# Patient Record
Sex: MALE | Race: WHITE | NOT HISPANIC OR LATINO | URBAN - METROPOLITAN AREA
[De-identification: names, ages, dates, MRNs, and addresses within clinical notes are randomized per-mention and may not be internally consistent; named-entity substitution may affect disease eponyms.]

---

## 2017-01-25 ENCOUNTER — OUTPATIENT (OUTPATIENT)
Dept: OUTPATIENT SERVICES | Facility: HOSPITAL | Age: 62
LOS: 1 days | End: 2017-01-25
Payer: COMMERCIAL

## 2017-01-25 ENCOUNTER — APPOINTMENT (OUTPATIENT)
Dept: ULTRASOUND IMAGING | Facility: CLINIC | Age: 62
End: 2017-01-25

## 2017-01-25 DIAGNOSIS — Z00.8 ENCOUNTER FOR OTHER GENERAL EXAMINATION: ICD-10-CM

## 2017-01-25 PROCEDURE — 76700 US EXAM ABDOM COMPLETE: CPT

## 2017-11-15 ENCOUNTER — APPOINTMENT (OUTPATIENT)
Dept: CT IMAGING | Facility: IMAGING CENTER | Age: 62
End: 2017-11-15
Payer: COMMERCIAL

## 2017-11-15 ENCOUNTER — OUTPATIENT (OUTPATIENT)
Dept: OUTPATIENT SERVICES | Facility: HOSPITAL | Age: 62
LOS: 1 days | End: 2017-11-15
Payer: COMMERCIAL

## 2017-11-15 DIAGNOSIS — Z00.8 ENCOUNTER FOR OTHER GENERAL EXAMINATION: ICD-10-CM

## 2017-11-15 PROCEDURE — 71250 CT THORAX DX C-: CPT

## 2017-11-15 PROCEDURE — 71250 CT THORAX DX C-: CPT | Mod: 26

## 2019-01-03 ENCOUNTER — APPOINTMENT (OUTPATIENT)
Dept: PULMONOLOGY | Facility: CLINIC | Age: 64
End: 2019-01-03
Payer: COMMERCIAL

## 2019-01-03 VITALS
HEART RATE: 88 BPM | SYSTOLIC BLOOD PRESSURE: 120 MMHG | HEIGHT: 68 IN | RESPIRATION RATE: 16 BRPM | BODY MASS INDEX: 21.22 KG/M2 | WEIGHT: 140 LBS | DIASTOLIC BLOOD PRESSURE: 70 MMHG | OXYGEN SATURATION: 97 %

## 2019-01-03 DIAGNOSIS — K58.9 IRRITABLE BOWEL SYNDROME W/OUT DIARRHEA: ICD-10-CM

## 2019-01-03 PROCEDURE — 94727 GAS DIL/WSHOT DETER LNG VOL: CPT

## 2019-01-03 PROCEDURE — 94250: CPT

## 2019-01-03 PROCEDURE — 99214 OFFICE O/P EST MOD 30 MIN: CPT | Mod: 25

## 2019-01-03 PROCEDURE — 94729 DIFFUSING CAPACITY: CPT

## 2019-01-03 PROCEDURE — 99406 BEHAV CHNG SMOKING 3-10 MIN: CPT

## 2019-01-03 PROCEDURE — 94060 EVALUATION OF WHEEZING: CPT

## 2019-01-03 PROCEDURE — 71046 X-RAY EXAM CHEST 2 VIEWS: CPT

## 2019-01-03 PROCEDURE — 88738 HGB QUANT TRANSCUTANEOUS: CPT

## 2019-01-14 ENCOUNTER — FORM ENCOUNTER (OUTPATIENT)
Age: 64
End: 2019-01-14

## 2019-01-15 ENCOUNTER — APPOINTMENT (OUTPATIENT)
Dept: CT IMAGING | Facility: IMAGING CENTER | Age: 64
End: 2019-01-15
Payer: COMMERCIAL

## 2019-01-15 ENCOUNTER — OUTPATIENT (OUTPATIENT)
Dept: OUTPATIENT SERVICES | Facility: HOSPITAL | Age: 64
LOS: 1 days | End: 2019-01-15
Payer: COMMERCIAL

## 2019-01-15 DIAGNOSIS — R91.8 OTHER NONSPECIFIC ABNORMAL FINDING OF LUNG FIELD: ICD-10-CM

## 2019-01-15 PROCEDURE — 71250 CT THORAX DX C-: CPT

## 2019-01-15 PROCEDURE — 71250 CT THORAX DX C-: CPT | Mod: 26

## 2019-02-25 ENCOUNTER — FORM ENCOUNTER (OUTPATIENT)
Age: 64
End: 2019-02-25

## 2019-02-25 ENCOUNTER — LABORATORY RESULT (OUTPATIENT)
Age: 64
End: 2019-02-25

## 2019-02-26 ENCOUNTER — OUTPATIENT (OUTPATIENT)
Dept: OUTPATIENT SERVICES | Facility: HOSPITAL | Age: 64
LOS: 1 days | End: 2019-02-26
Payer: COMMERCIAL

## 2019-02-26 ENCOUNTER — NON-APPOINTMENT (OUTPATIENT)
Age: 64
End: 2019-02-26

## 2019-02-26 ENCOUNTER — APPOINTMENT (OUTPATIENT)
Dept: CT IMAGING | Facility: CLINIC | Age: 64
End: 2019-02-26

## 2019-02-26 ENCOUNTER — APPOINTMENT (OUTPATIENT)
Dept: CARDIOLOGY | Facility: CLINIC | Age: 64
End: 2019-02-26
Payer: COMMERCIAL

## 2019-02-26 VITALS
RESPIRATION RATE: 16 BRPM | HEART RATE: 74 BPM | DIASTOLIC BLOOD PRESSURE: 82 MMHG | SYSTOLIC BLOOD PRESSURE: 127 MMHG | BODY MASS INDEX: 21.37 KG/M2 | HEIGHT: 68 IN | WEIGHT: 141 LBS

## 2019-02-26 VITALS — BODY MASS INDEX: 19.11 KG/M2 | WEIGHT: 129 LBS | HEIGHT: 69 IN

## 2019-02-26 VITALS
RESPIRATION RATE: 16 BRPM | BODY MASS INDEX: 19.11 KG/M2 | DIASTOLIC BLOOD PRESSURE: 84 MMHG | WEIGHT: 129 LBS | HEART RATE: 73 BPM | HEIGHT: 69 IN | SYSTOLIC BLOOD PRESSURE: 127 MMHG

## 2019-02-26 DIAGNOSIS — M19.90 UNSPECIFIED OSTEOARTHRITIS, UNSPECIFIED SITE: ICD-10-CM

## 2019-02-26 DIAGNOSIS — Z00.8 ENCOUNTER FOR OTHER GENERAL EXAMINATION: ICD-10-CM

## 2019-02-26 DIAGNOSIS — E55.9 VITAMIN D DEFICIENCY, UNSPECIFIED: ICD-10-CM

## 2019-02-26 DIAGNOSIS — Z87.891 PERSONAL HISTORY OF NICOTINE DEPENDENCE: ICD-10-CM

## 2019-02-26 PROCEDURE — 75571 CT HRT W/O DYE W/CA TEST: CPT

## 2019-02-26 PROCEDURE — 93000 ELECTROCARDIOGRAM COMPLETE: CPT

## 2019-02-26 PROCEDURE — 99242 OFF/OP CONSLTJ NEW/EST SF 20: CPT

## 2019-02-26 RX ORDER — METHYLPREDNISOLONE 4 MG/1
4 TABLET ORAL
Qty: 1 | Refills: 0 | Status: COMPLETED | COMMUNITY
Start: 2019-01-03 | End: 2019-02-26

## 2019-02-26 NOTE — REASON FOR VISIT
[Consultation] : a consultation regarding [Dyspnea] : dyspnea [FreeTextEntry1] : murmur, coronary calcification

## 2019-02-26 NOTE — PHYSICAL EXAM
[General Appearance - Well Developed] : well developed [Normal Appearance] : normal appearance [Well Groomed] : well groomed [General Appearance - Well Nourished] : well nourished [No Deformities] : no deformities [General Appearance - In No Acute Distress] : no acute distress [Normal Conjunctiva] : the conjunctiva exhibited no abnormalities [Normal Oral Mucosa] : normal oral mucosa [Normal Jugular Venous A Waves Present] : normal jugular venous A waves present [Normal Jugular Venous V Waves Present] : normal jugular venous V waves present [No Jugular Venous Kramer A Waves] : no jugular venous kramer A waves [5th Left ICS - MCL] : palpated at the 5th LICS in the midclavicular line [Normal] : normal [No Precordial Heave] : no precordial heave was noted [Normal Rate] : normal [Rhythm Regular] : regular [Normal S1] : normal S1 [Normal S2] : normal S2 [No Gallop] : no gallop heard [II] : a grade 2 [2+] : left 2+ [No Pitting Edema] : no pitting edema present [Respiration, Rhythm And Depth] : normal respiratory rhythm and effort [Exaggerated Use Of Accessory Muscles For Inspiration] : no accessory muscle use [Auscultation Breath Sounds / Voice Sounds] : lungs were clear to auscultation bilaterally [Bowel Sounds] : normal bowel sounds [Abdomen Soft] : soft [Abnormal Walk] : normal gait [Gait - Sufficient For Exercise Testing] : the gait was sufficient for exercise testing [Nail Clubbing] : no clubbing of the fingernails [Cyanosis, Localized] : no localized cyanosis [Skin Color & Pigmentation] : normal skin color and pigmentation [Skin Turgor] : normal skin turgor [] : no rash [Oriented To Time, Place, And Person] : oriented to person, place, and time [Affect] : the affect was normal [Mood] : the mood was normal [No Anxiety] : not feeling anxious [FreeTextEntry1] : left bruit vs. transmitted murmur [Click] : no click [Distant] : the heart sounds were ~L not distant [Pericardial Rub] : no pericardial rub

## 2019-02-26 NOTE — DISCUSSION/SUMMARY
[FreeTextEntry1] : This is a 63-year-old male with past medical history significant for hyperlipidemia, status post cholecystectomy, coronary artery calcification, who comes in for cardiac consultation. The patient denies chest pain, dizziness, palpitations or syncope. The patient has no history of rheumatic fever. Cardiac risk factors include hyperlipidemia, and smoking a half a pack to one pack per day over the last 35 years. He was just given Wellbutrin and may be able to accomplish smoking cessation. He will work with his pulmonologist for that issue.\par The patient reports that he had a recent normal exercise stress test. He'll get me a copy of those results. He also reports that he been on Pravachol 40 mg daily which was recently switched to Crestor 10 mg daily after he was found to have coronary artery calcification.\par The patient at chest CAT scan in 2017 which demonstrated coronary artery calcification. A new CAT scan done January 15, 2019 confirm the presence of coronary atherosclerosis. There was no detailed definition regarding extent the location of the coronary calcification.\par Electrocardiogram doesn't February 26, 2019 demonstrated normal sinus rhythm rate 72 beats per minutes otherwise remarkable for left axis deviation and left atrial abnormality.\par He will schedule an echo Doppler examination to evaluate murmur, left ventricular function, chamber size, and rule out hypertrophy.\par He does get occasional leg cramping on walking and will get an ankle brachial index done. A carotid Doppler examination will also be done to rule out significant carotid artery disease. He will schedule a coronary artery calcium score to accurately define the burden of calcium in his coronary arteries.\par The patient will followup with me after the above noted diagnostic tests are completed. His LDL cholesterol goal is less than 70 mg/dL.\par Smoking cessation was strongly encouraged.\par The patient understands that aerobic exercises must be increased to 40 minutes 4 times per week. A detailed discussion of lifestyle modification was done today. The patient has a good understanding of the diagnosis, and treatment plan. Lifestyle modification was also outlined.

## 2019-03-05 ENCOUNTER — APPOINTMENT (OUTPATIENT)
Dept: CARDIOLOGY | Facility: CLINIC | Age: 64
End: 2019-03-05
Payer: COMMERCIAL

## 2019-03-05 PROCEDURE — 93922 UPR/L XTREMITY ART 2 LEVELS: CPT

## 2019-03-05 PROCEDURE — 93306 TTE W/DOPPLER COMPLETE: CPT

## 2019-03-05 PROCEDURE — ZZZZZ: CPT

## 2019-03-19 RX ORDER — LINACLOTIDE 290 UG/1
290 CAPSULE, GELATIN COATED ORAL DAILY
Refills: 0 | Status: ACTIVE | COMMUNITY
Start: 2019-01-03

## 2019-03-20 ENCOUNTER — APPOINTMENT (OUTPATIENT)
Dept: CARDIOLOGY | Facility: CLINIC | Age: 64
End: 2019-03-20
Payer: COMMERCIAL

## 2019-03-20 VITALS
RESPIRATION RATE: 16 BRPM | HEIGHT: 68 IN | HEART RATE: 62 BPM | BODY MASS INDEX: 21.37 KG/M2 | WEIGHT: 141 LBS | SYSTOLIC BLOOD PRESSURE: 128 MMHG | DIASTOLIC BLOOD PRESSURE: 80 MMHG

## 2019-03-20 PROCEDURE — 99213 OFFICE O/P EST LOW 20 MIN: CPT

## 2019-03-20 NOTE — PHYSICAL EXAM
[General Appearance - Well Developed] : well developed [Normal Appearance] : normal appearance [Well Groomed] : well groomed [General Appearance - Well Nourished] : well nourished [No Deformities] : no deformities [General Appearance - In No Acute Distress] : no acute distress [Normal Conjunctiva] : the conjunctiva exhibited no abnormalities [Normal Oral Mucosa] : normal oral mucosa [Normal Jugular Venous A Waves Present] : normal jugular venous A waves present [Normal Jugular Venous V Waves Present] : normal jugular venous V waves present [No Jugular Venous Kramer A Waves] : no jugular venous kramer A waves [Respiration, Rhythm And Depth] : normal respiratory rhythm and effort [Exaggerated Use Of Accessory Muscles For Inspiration] : no accessory muscle use [Auscultation Breath Sounds / Voice Sounds] : lungs were clear to auscultation bilaterally [Bowel Sounds] : normal bowel sounds [Abdomen Soft] : soft [Abnormal Walk] : normal gait [Gait - Sufficient For Exercise Testing] : the gait was sufficient for exercise testing [Nail Clubbing] : no clubbing of the fingernails [Cyanosis, Localized] : no localized cyanosis [Skin Color & Pigmentation] : normal skin color and pigmentation [Skin Turgor] : normal skin turgor [] : no rash [Oriented To Time, Place, And Person] : oriented to person, place, and time [Affect] : the affect was normal [Mood] : the mood was normal [No Anxiety] : not feeling anxious [5th Left ICS - MCL] : palpated at the 5th LICS in the midclavicular line [Normal] : normal [No Precordial Heave] : no precordial heave was noted [Normal Rate] : normal [Rhythm Regular] : regular [Normal S1] : normal S1 [Normal S2] : normal S2 [No Gallop] : no gallop heard [II] : a grade 2 [2+] : left 2+ [No Pitting Edema] : no pitting edema present [FreeTextEntry1] : left bruit vs. transmitted murmur [Click] : no click [Pericardial Rub] : no pericardial rub

## 2019-03-20 NOTE — DISCUSSION/SUMMARY
[FreeTextEntry1] : This is a 63-year-old male with past medical history significant for hyperlipidemia, status post cholecystectomy, coronary artery calcification, who comes in for cardiac follow up evaluation. The patient denies chest pain, dizziness, palpitations or syncope. The patient has no history of rheumatic fever. Cardiac risk factors include hyperlipidemia, and smoking a half a pack to one pack per day over the last 35 years.\par The patient had a coronary calcium score done February 26, 2019 which demonstrated a total coronary calcium level of 129 units, all in the left anterior descending artery.\par The patient had a nuclear stress test done by his prior cardiologist April 30, 2018 which demonstrated normal coronary perfusion with an estimated ejection fraction 61% and normal wall motion.\par Blood work done February 26, 2019 demonstrated an LDL direct cholesterol level of 141 mg/dL, ApoB lipoprotein 121.\par The patient is instructed to followup with his pulmonologist regarding his smoking cessation.  Smoking cessation was reinforced.  He will have blood work in 6-8 weeks.  He is currently hemodynamically stable and asymptomatic from a cardiac standpoint.  He will also remain on aspirin 162 mg per day.\par He was just given Wellbutrin and may be able to accomplish smoking cessation. He will work with his pulmonologist for that issue.\par .The patient at chest CAT scan in 2017 which demonstrated coronary artery calcification. A new CAT scan done January 15, 2019 confirm the presence of coronary atherosclerosis. There was no detailed definition regarding extent the location of the coronary calcification.\par Electrocardiogram doesn't February 26, 2019 demonstrated normal sinus rhythm rate 72 beats per minutes otherwise remarkable for left axis deviation and left atrial abnormality.\par He will schedule an echo Doppler examination to evaluate murmur, left ventricular function, chamber size, and rule out hypertrophy.\par He does get occasional leg cramping on walking and will get an ankle brachial index done. A carotid Doppler examination will also be done to rule out significant carotid artery disease. He will schedule a coronary artery calcium score to accurately define the burden of calcium in his coronary arteries.\par The patient will followup with me after the above noted diagnostic tests are completed. His LDL cholesterol goal is less than 70 mg/dL.\par Smoking cessation was strongly encouraged.\par The patient understands that aerobic exercises must be increased to 40 minutes 4 times per week. A detailed discussion of lifestyle modification was done today. The patient has a good understanding of the diagnosis, and treatment plan. Lifestyle modification was also outlined.

## 2019-03-27 RX ORDER — ROSUVASTATIN CALCIUM 10 MG/1
10 TABLET, FILM COATED ORAL DAILY
Qty: 90 | Refills: 1 | Status: DISCONTINUED | COMMUNITY
Start: 2019-01-03 | End: 2019-03-27

## 2019-04-04 ENCOUNTER — APPOINTMENT (OUTPATIENT)
Dept: PULMONOLOGY | Facility: CLINIC | Age: 64
End: 2019-04-04

## 2019-04-30 ENCOUNTER — RX RENEWAL (OUTPATIENT)
Age: 64
End: 2019-04-30

## 2019-05-10 ENCOUNTER — LABORATORY RESULT (OUTPATIENT)
Age: 64
End: 2019-05-10

## 2019-09-05 ENCOUNTER — APPOINTMENT (OUTPATIENT)
Dept: PULMONOLOGY | Facility: CLINIC | Age: 64
End: 2019-09-05
Payer: COMMERCIAL

## 2019-09-05 VITALS — HEART RATE: 82 BPM | OXYGEN SATURATION: 96 % | SYSTOLIC BLOOD PRESSURE: 106 MMHG | DIASTOLIC BLOOD PRESSURE: 64 MMHG

## 2019-09-05 PROCEDURE — 99406 BEHAV CHNG SMOKING 3-10 MIN: CPT

## 2019-09-05 PROCEDURE — 99213 OFFICE O/P EST LOW 20 MIN: CPT | Mod: 25

## 2019-09-05 PROCEDURE — 71046 X-RAY EXAM CHEST 2 VIEWS: CPT

## 2019-09-05 NOTE — REVIEW OF SYSTEMS
[Nasal Congestion] : nasal congestion [As Noted in HPI] : as noted in HPI [Negative] : Cardiovascular

## 2019-09-05 NOTE — HISTORY OF PRESENT ILLNESS
[FreeTextEntry1] : 8 days of "head congestion", now "settling into the chest" cough with green sputum, feels tired/run down.  Wife just getting over parainfluenza virus.  no fever/chills.  still smoking daily.  cut back since hasn't been feeling well.

## 2019-09-05 NOTE — PHYSICAL EXAM
[Well Groomed] : well groomed [General Appearance - In No Acute Distress] : no acute distress [Erythema] : erythema of the pharynx [Heart Sounds] : normal S1 and S2 [Neck Appearance] : the appearance of the neck was normal [] : no respiratory distress [Respiration, Rhythm And Depth] : normal respiratory rhythm and effort [Exaggerated Use Of Accessory Muscles For Inspiration] : no accessory muscle use [Auscultation Breath Sounds / Voice Sounds] : lungs were clear to auscultation bilaterally [Nail Clubbing] : no clubbing of the fingernails [Cyanosis, Localized] : no localized cyanosis

## 2019-09-05 NOTE — ASSESSMENT
[FreeTextEntry1] : medrol pack\par smoking cessation counseling, pt states he has quit before, in process of trying to quit again with wife.\par follow up rvp\par will let me know if develops any other symptoms such as fever/chills.

## 2019-09-05 NOTE — PROCEDURE
[FreeTextEntry1] : CXR: clear lungs, no focal consolidation or pleural effusions, cardiac silhouette appears normal.  No bony abnormality.\par \par

## 2019-09-06 LAB
RAPID RVP RESULT: DETECTED
RV+EV RNA SPEC QL NAA+PROBE: DETECTED

## 2019-09-09 ENCOUNTER — APPOINTMENT (OUTPATIENT)
Dept: PULMONOLOGY | Facility: CLINIC | Age: 64
End: 2019-09-09
Payer: COMMERCIAL

## 2019-09-09 VITALS — SYSTOLIC BLOOD PRESSURE: 117 MMHG | OXYGEN SATURATION: 97 % | HEART RATE: 76 BPM | DIASTOLIC BLOOD PRESSURE: 69 MMHG

## 2019-09-09 DIAGNOSIS — J06.9 ACUTE UPPER RESPIRATORY INFECTION, UNSPECIFIED: ICD-10-CM

## 2019-09-09 PROCEDURE — 87880 STREP A ASSAY W/OPTIC: CPT | Mod: QW

## 2019-09-09 PROCEDURE — 99213 OFFICE O/P EST LOW 20 MIN: CPT | Mod: 25

## 2019-09-10 LAB — S PYO AG SPEC QL IA: NORMAL

## 2019-09-10 NOTE — PHYSICAL EXAM
[General Appearance - Well Developed] : well developed [Normal Appearance] : normal appearance [Well Groomed] : well groomed [General Appearance - Well Nourished] : well nourished [No Deformities] : no deformities [General Appearance - In No Acute Distress] : no acute distress [Heart Rate And Rhythm] : heart rate and rhythm were normal [Erythema] : erythema of the pharynx [Murmurs] : no murmurs present [Heart Sounds] : normal S1 and S2 [Exaggerated Use Of Accessory Muscles For Inspiration] : no accessory muscle use [Respiration, Rhythm And Depth] : normal respiratory rhythm and effort [Abdomen Soft] : soft [Auscultation Breath Sounds / Voice Sounds] : lungs were clear to auscultation bilaterally [Abdomen Tenderness] : non-tender [Nail Clubbing] : no clubbing of the fingernails [Abdomen Mass (___ Cm)] : no abdominal mass palpated [Petechial Hemorrhages (___cm)] : no petechial hemorrhages [Cyanosis, Localized] : no localized cyanosis [] : no ischemic changes

## 2019-09-10 NOTE — REASON FOR VISIT
[Follow-Up] : a follow-up visit [Cough] : cough [FreeTextEntry2] : PND for 2 weeks, Rx Medrol pack. RVP + Rhinovirus

## 2019-09-13 ENCOUNTER — RX CHANGE (OUTPATIENT)
Age: 64
End: 2019-09-13

## 2019-09-16 ENCOUNTER — RX RENEWAL (OUTPATIENT)
Age: 64
End: 2019-09-16

## 2019-09-16 RX ORDER — AZITHROMYCIN 250 MG/1
250 TABLET, FILM COATED ORAL
Qty: 6 | Refills: 0 | Status: DISCONTINUED | COMMUNITY
Start: 2019-09-09 | End: 2019-09-16

## 2019-09-19 ENCOUNTER — APPOINTMENT (OUTPATIENT)
Dept: PULMONOLOGY | Facility: CLINIC | Age: 64
End: 2019-09-19
Payer: COMMERCIAL

## 2019-09-19 VITALS
WEIGHT: 141 LBS | DIASTOLIC BLOOD PRESSURE: 68 MMHG | HEIGHT: 68 IN | OXYGEN SATURATION: 96 % | RESPIRATION RATE: 16 BRPM | SYSTOLIC BLOOD PRESSURE: 117 MMHG | HEART RATE: 81 BPM | BODY MASS INDEX: 21.37 KG/M2

## 2019-09-19 PROCEDURE — 94729 DIFFUSING CAPACITY: CPT

## 2019-09-19 PROCEDURE — 95012 NITRIC OXIDE EXP GAS DETER: CPT

## 2019-09-19 PROCEDURE — 71046 X-RAY EXAM CHEST 2 VIEWS: CPT

## 2019-09-19 PROCEDURE — 99214 OFFICE O/P EST MOD 30 MIN: CPT | Mod: 25

## 2019-09-19 PROCEDURE — 94727 GAS DIL/WSHOT DETER LNG VOL: CPT

## 2019-09-19 PROCEDURE — 94060 EVALUATION OF WHEEZING: CPT

## 2019-09-19 NOTE — PROCEDURE
[FreeTextEntry1] : Exhaled nitric oxide 14 ppb\par \par Chest x-ray PA lateral\par September 19, 2019\par Predominantly right mid lower lung zone bronchiectatic changes\par \par PFT September 19, 2019\par Well-preserved flow rates FEV1 FVC ratio 70 with an obstructive pattern\par Normal lung volumes with total capacity 124% predicted.\par Diffusion moderately reduced at 61% predicted with a loss of functioning alveolocapillary units\par Heme globin 14.7\par Decline at diffusion with interval improvement at the total lung capacity\par \par PFT January 3, 2019\par Very minimal obstructive ventilatory impairment with well-preserved flow rates\par No bronchodilator response at FEV1\par Lung volumes normal\par Very minimal diffusion abnormality with loss of functioning alveolar capillary units 72% of predicted.\par Hemoglobin 14.5\par \par Chest x-ray PA lateral projection January 3, 2019\par Cardiac size normal\par Hyper aeration\par Peribronchial cuffing\par No dominant pulmonary nodules parenchymal infiltrates pleural effusions\par Mediastinum normal\par Increased retrosternal airspace consistent with air-trapping\par Impression COPD

## 2019-09-19 NOTE — PHYSICAL EXAM
[General Appearance - Well Developed] : well developed [Normal Appearance] : normal appearance [Well Groomed] : well groomed [General Appearance - Well Nourished] : well nourished [No Deformities] : no deformities [General Appearance - In No Acute Distress] : no acute distress [Normal Conjunctiva] : the conjunctiva exhibited no abnormalities [Eyelids - No Xanthelasma] : the eyelids demonstrated no xanthelasmas [Normal Oropharynx] : normal oropharynx [I] : I [Neck Appearance] : the appearance of the neck was normal [Neck Cervical Mass (___cm)] : no neck mass was observed [Jugular Venous Distention Increased] : there was no jugular-venous distention [Thyroid Diffuse Enlargement] : the thyroid was not enlarged [Thyroid Nodule] : there were no palpable thyroid nodules [Heart Rate And Rhythm] : heart rate and rhythm were normal [Heart Sounds] : normal S1 and S2 [Murmurs] : no murmurs present [Arterial Pulses Normal] : the arterial pulses were normal [Edema] : no peripheral edema present [Veins - Varicosity Changes] : no varicosital changes were noted in the lower extremities [Respiration, Rhythm And Depth] : normal respiratory rhythm and effort [Exaggerated Use Of Accessory Muscles For Inspiration] : no accessory muscle use [Auscultation Breath Sounds / Voice Sounds] : lungs were clear to auscultation bilaterally [Chest Palpation] : palpation of the chest revealed no abnormalities [Lungs Percussion] : the lungs were normal to percussion [Bowel Sounds] : normal bowel sounds [Abdomen Soft] : soft [Abdomen Tenderness] : non-tender [Abdomen Mass (___ Cm)] : no abdominal mass palpated [Abnormal Walk] : normal gait [Gait - Sufficient For Exercise Testing] : the gait was sufficient for exercise testing [Nail Clubbing] : no clubbing of the fingernails [Cyanosis, Localized] : no localized cyanosis [Petechial Hemorrhages (___cm)] : no petechial hemorrhages [Skin Color & Pigmentation] : normal skin color and pigmentation [] : no rash [No Venous Stasis] : no venous stasis [Skin Lesions] : no skin lesions [No Skin Ulcers] : no skin ulcer [No Xanthoma] : no  xanthoma was observed [Deep Tendon Reflexes (DTR)] : deep tendon reflexes were 2+ and symmetric [Sensation] : the sensory exam was normal to light touch and pinprick [Motor Exam] : the motor exam was normal [No Focal Deficits] : no focal deficits [Oriented To Time, Place, And Person] : oriented to person, place, and time [Impaired Insight] : insight and judgment were intact [Affect] : the affect was normal

## 2019-09-19 NOTE — HISTORY OF PRESENT ILLNESS
[Date: ___] : was performed [unfilled] [de-identified] : subcm pulmonary nodules/ apical scar [FreeTextEntry1] : Complaint chronic cough\par Did complete a course of oral steroids and did feel somewhat better while on the steroids cefuroxime mean\par Has some back pain due to the cough\par No purulent sputum\par Active tobacco use\par No reported fevers chills or sweats\par No lower extremity edema\par \par \par in past did use Wellbutrin with some mild success but now actively smoking\par s/p URI\par Chest congestion\par

## 2019-09-19 NOTE — DISCUSSION/SUMMARY
[FreeTextEntry1] : \par CT chest completed\par No change in subcentimeter pulmonary nodules in a high risk smoker\par 1 year low-dose screening follow-up protocol to consider as well Jan 2020\par Chronic cough\par Obstructive airway disease/COPd with Diffusion abnormality\par Active tobacco smoker\par Asmanex  MCG 2 puffs twice daily with instructions to rinse with MDI instructions\par Spiriva Respimat 2.5 MCG 2 puffs daily with instructions\par Will provide Pro Air for rescue inhalation\par Office follow-up

## 2019-10-04 ENCOUNTER — APPOINTMENT (OUTPATIENT)
Dept: PULMONOLOGY | Facility: CLINIC | Age: 64
End: 2019-10-04
Payer: COMMERCIAL

## 2019-10-04 VITALS
RESPIRATION RATE: 15 BRPM | BODY MASS INDEX: 20.92 KG/M2 | SYSTOLIC BLOOD PRESSURE: 106 MMHG | HEIGHT: 68 IN | WEIGHT: 138 LBS | DIASTOLIC BLOOD PRESSURE: 68 MMHG | HEART RATE: 72 BPM | OXYGEN SATURATION: 97 %

## 2019-10-04 PROCEDURE — 99213 OFFICE O/P EST LOW 20 MIN: CPT | Mod: 25

## 2019-10-04 PROCEDURE — 94060 EVALUATION OF WHEEZING: CPT

## 2019-10-04 RX ORDER — HYDROCORTISONE ACETATE, PRAMOXINE HCL 2.5; 1 G/100G; G/100G
2.5-1 CREAM TOPICAL
Qty: 57 | Refills: 0 | Status: DISCONTINUED | COMMUNITY
Start: 2019-05-01

## 2019-10-04 RX ORDER — DICYCLOMINE HYDROCHLORIDE 10 MG/1
10 CAPSULE ORAL
Qty: 90 | Refills: 0 | Status: DISCONTINUED | COMMUNITY
Start: 2019-05-01

## 2019-10-04 NOTE — PROCEDURE
[FreeTextEntry1] : Ergometry October 4, 2019\par Normal flow rates\par Very minimal obstructive pattern\par Interval improvement at flow rates\par \par Exhaled nitric oxide 14 ppb Sept 19 2019\par \par Chest x-ray PA lateral\par September 19, 2019\par Predominantly right mid lower lung zone bronchiectatic changes\par \par PFT September 19, 2019\par Well-preserved flow rates FEV1 FVC ratio 70 with an obstructive pattern\par Normal lung volumes with total capacity 124% predicted.\par Diffusion moderately reduced at 61% predicted with a loss of functioning alveolocapillary units\par Heme globin 14.7\par Decline at diffusion with interval improvement at the total lung capacity\par \par Chest x-ray PA lateral projection January 3, 2019\par Cardiac size normal\par Hyper aeration\par Peribronchial cuffing\par No dominant pulmonary nodules parenchymal infiltrates pleural effusions\par Mediastinum normal\par Increased retrosternal airspace consistent with air-trapping\par Impression COPD

## 2019-10-04 NOTE — HISTORY OF PRESENT ILLNESS
[Date: ___] : was performed [unfilled] [de-identified] : subcm pulmonary nodules/ apical scar [FreeTextEntry1] : in past did use Wellbutrin with some mild success but now actively smoking\par s/p URI\par Chest congestion better post  tx with  antibx\par

## 2019-10-04 NOTE — PHYSICAL EXAM
[General Appearance - Well Developed] : well developed [Normal Appearance] : normal appearance [No Deformities] : no deformities [General Appearance - Well Nourished] : well nourished [Well Groomed] : well groomed [General Appearance - In No Acute Distress] : no acute distress [Normal Conjunctiva] : the conjunctiva exhibited no abnormalities [Eyelids - No Xanthelasma] : the eyelids demonstrated no xanthelasmas [Normal Oropharynx] : normal oropharynx [I] : I [Neck Appearance] : the appearance of the neck was normal [Neck Cervical Mass (___cm)] : no neck mass was observed [Jugular Venous Distention Increased] : there was no jugular-venous distention [Thyroid Diffuse Enlargement] : the thyroid was not enlarged [Heart Sounds] : normal S1 and S2 [Heart Rate And Rhythm] : heart rate and rhythm were normal [Thyroid Nodule] : there were no palpable thyroid nodules [Murmurs] : no murmurs present [Arterial Pulses Normal] : the arterial pulses were normal [Edema] : no peripheral edema present [Veins - Varicosity Changes] : no varicosital changes were noted in the lower extremities [Exaggerated Use Of Accessory Muscles For Inspiration] : no accessory muscle use [Respiration, Rhythm And Depth] : normal respiratory rhythm and effort [Chest Palpation] : palpation of the chest revealed no abnormalities [Auscultation Breath Sounds / Voice Sounds] : lungs were clear to auscultation bilaterally [Lungs Percussion] : the lungs were normal to percussion [Bowel Sounds] : normal bowel sounds [Abdomen Soft] : soft [Abdomen Tenderness] : non-tender [Abnormal Walk] : normal gait [Abdomen Mass (___ Cm)] : no abdominal mass palpated [Nail Clubbing] : no clubbing of the fingernails [Gait - Sufficient For Exercise Testing] : the gait was sufficient for exercise testing [Petechial Hemorrhages (___cm)] : no petechial hemorrhages [Cyanosis, Localized] : no localized cyanosis [Skin Color & Pigmentation] : normal skin color and pigmentation [No Venous Stasis] : no venous stasis [] : no rash [Skin Lesions] : no skin lesions [No Xanthoma] : no  xanthoma was observed [No Skin Ulcers] : no skin ulcer [Sensation] : the sensory exam was normal to light touch and pinprick [Deep Tendon Reflexes (DTR)] : deep tendon reflexes were 2+ and symmetric [Motor Exam] : the motor exam was normal [No Focal Deficits] : no focal deficits [Oriented To Time, Place, And Person] : oriented to person, place, and time [Impaired Insight] : insight and judgment were intact [Affect] : the affect was normal

## 2019-10-11 ENCOUNTER — APPOINTMENT (OUTPATIENT)
Dept: CARDIOLOGY | Facility: CLINIC | Age: 64
End: 2019-10-11
Payer: COMMERCIAL

## 2019-11-01 ENCOUNTER — NON-APPOINTMENT (OUTPATIENT)
Age: 64
End: 2019-11-01

## 2019-11-01 ENCOUNTER — APPOINTMENT (OUTPATIENT)
Dept: CARDIOLOGY | Facility: CLINIC | Age: 64
End: 2019-11-01
Payer: COMMERCIAL

## 2019-11-01 VITALS
WEIGHT: 127 LBS | HEIGHT: 68 IN | BODY MASS INDEX: 19.25 KG/M2 | RESPIRATION RATE: 15 BRPM | SYSTOLIC BLOOD PRESSURE: 116 MMHG | DIASTOLIC BLOOD PRESSURE: 76 MMHG | HEART RATE: 76 BPM

## 2019-11-01 DIAGNOSIS — Z86.79 PERSONAL HISTORY OF OTHER DISEASES OF THE CIRCULATORY SYSTEM: ICD-10-CM

## 2019-11-01 DIAGNOSIS — R09.89 OTHER SPECIFIED SYMPTOMS AND SIGNS INVOLVING THE CIRCULATORY AND RESPIRATORY SYSTEMS: ICD-10-CM

## 2019-11-01 PROCEDURE — 99214 OFFICE O/P EST MOD 30 MIN: CPT

## 2019-11-01 PROCEDURE — 93000 ELECTROCARDIOGRAM COMPLETE: CPT

## 2019-11-04 RX ORDER — FLUTICASONE PROPIONATE 50 UG/1
50 SPRAY, METERED NASAL
Qty: 1 | Refills: 3 | Status: COMPLETED | COMMUNITY
Start: 2019-09-09 | End: 2019-11-04

## 2019-11-04 RX ORDER — SODIUM SULFATE, POTASSIUM SULFATE, MAGNESIUM SULFATE 17.5; 3.13; 1.6 G/ML; G/ML; G/ML
17.5-3.13-1.6 SOLUTION, CONCENTRATE ORAL
Qty: 354 | Refills: 0 | Status: COMPLETED | COMMUNITY
Start: 2019-05-02 | End: 2019-11-04

## 2019-11-04 RX ORDER — BUPROPION HYDROCHLORIDE 150 MG/1
150 TABLET, FILM COATED, EXTENDED RELEASE ORAL DAILY
Qty: 30 | Refills: 3 | Status: COMPLETED | COMMUNITY
Start: 2019-01-03 | End: 2019-11-04

## 2019-11-04 RX ORDER — CEFUROXIME AXETIL 500 MG/1
500 TABLET ORAL
Qty: 14 | Refills: 0 | Status: COMPLETED | COMMUNITY
Start: 2019-09-16 | End: 2019-11-04

## 2019-11-04 RX ORDER — TIOTROPIUM BROMIDE INHALATION SPRAY 3.12 UG/1
2.5 SPRAY, METERED RESPIRATORY (INHALATION)
Qty: 1 | Refills: 3 | Status: COMPLETED | COMMUNITY
Start: 2019-09-19 | End: 2019-11-04

## 2019-11-04 RX ORDER — METHYLPREDNISOLONE 4 MG/1
4 TABLET ORAL
Qty: 1 | Refills: 0 | Status: COMPLETED | COMMUNITY
Start: 2019-09-05 | End: 2019-11-04

## 2019-11-04 RX ORDER — MOMETASONE FUROATE 100 UG/1
100 AEROSOL RESPIRATORY (INHALATION)
Qty: 1 | Refills: 0 | Status: COMPLETED | COMMUNITY
Start: 2019-09-19 | End: 2019-11-04

## 2020-01-15 ENCOUNTER — APPOINTMENT (OUTPATIENT)
Dept: PULMONOLOGY | Facility: CLINIC | Age: 65
End: 2020-01-15

## 2020-03-19 ENCOUNTER — APPOINTMENT (OUTPATIENT)
Dept: CARDIOLOGY | Facility: CLINIC | Age: 65
End: 2020-03-19

## 2020-04-15 ENCOUNTER — TRANSCRIPTION ENCOUNTER (OUTPATIENT)
Age: 65
End: 2020-04-15

## 2020-06-09 ENCOUNTER — APPOINTMENT (OUTPATIENT)
Dept: CARDIOLOGY | Facility: CLINIC | Age: 65
End: 2020-06-09
Payer: MEDICARE

## 2020-06-09 ENCOUNTER — NON-APPOINTMENT (OUTPATIENT)
Age: 65
End: 2020-06-09

## 2020-06-09 VITALS
HEART RATE: 80 BPM | DIASTOLIC BLOOD PRESSURE: 70 MMHG | HEIGHT: 68 IN | SYSTOLIC BLOOD PRESSURE: 110 MMHG | WEIGHT: 135 LBS | BODY MASS INDEX: 20.46 KG/M2 | RESPIRATION RATE: 16 BRPM

## 2020-06-09 PROCEDURE — 99213 OFFICE O/P EST LOW 20 MIN: CPT

## 2020-06-09 PROCEDURE — 93000 ELECTROCARDIOGRAM COMPLETE: CPT

## 2020-06-10 RX ORDER — DIAZEPAM 5 MG/1
5 TABLET ORAL
Refills: 0 | Status: ACTIVE | COMMUNITY
Start: 2019-06-28

## 2020-07-20 ENCOUNTER — APPOINTMENT (OUTPATIENT)
Dept: DISASTER EMERGENCY | Facility: CLINIC | Age: 65
End: 2020-07-20

## 2020-07-23 ENCOUNTER — APPOINTMENT (OUTPATIENT)
Dept: PULMONOLOGY | Facility: CLINIC | Age: 65
End: 2020-07-23

## 2020-08-14 DIAGNOSIS — Z20.828 CONTACT WITH AND (SUSPECTED) EXPOSURE TO OTHER VIRAL COMMUNICABLE DISEASES: ICD-10-CM

## 2020-08-16 DIAGNOSIS — Z01.818 ENCOUNTER FOR OTHER PREPROCEDURAL EXAMINATION: ICD-10-CM

## 2020-08-17 ENCOUNTER — APPOINTMENT (OUTPATIENT)
Dept: DISASTER EMERGENCY | Facility: CLINIC | Age: 65
End: 2020-08-17

## 2020-08-17 LAB — SARS-COV-2 N GENE NPH QL NAA+PROBE: NOT DETECTED

## 2020-08-20 ENCOUNTER — APPOINTMENT (OUTPATIENT)
Dept: PULMONOLOGY | Facility: CLINIC | Age: 65
End: 2020-08-20
Payer: MEDICARE

## 2020-08-20 PROCEDURE — 99406 BEHAV CHNG SMOKING 3-10 MIN: CPT | Mod: 25

## 2020-08-20 PROCEDURE — 94729 DIFFUSING CAPACITY: CPT

## 2020-08-20 PROCEDURE — 94750: CPT

## 2020-08-20 PROCEDURE — 99214 OFFICE O/P EST MOD 30 MIN: CPT | Mod: 25

## 2020-08-20 PROCEDURE — 71046 X-RAY EXAM CHEST 2 VIEWS: CPT

## 2020-08-20 PROCEDURE — 94726 PLETHYSMOGRAPHY LUNG VOLUMES: CPT

## 2020-08-20 PROCEDURE — 94010 BREATHING CAPACITY TEST: CPT

## 2020-08-20 PROCEDURE — 95012 NITRIC OXIDE EXP GAS DETER: CPT

## 2020-08-20 PROCEDURE — G0296 VISIT TO DETERM LDCT ELIG: CPT

## 2020-08-20 NOTE — PHYSICAL EXAM
[General Appearance - Well Developed] : well developed [Normal Appearance] : normal appearance [General Appearance - Well Nourished] : well nourished [Well Groomed] : well groomed [No Deformities] : no deformities [Normal Conjunctiva] : the conjunctiva exhibited no abnormalities [General Appearance - In No Acute Distress] : no acute distress [Eyelids - No Xanthelasma] : the eyelids demonstrated no xanthelasmas [Normal Oropharynx] : normal oropharynx [I] : I [Neck Appearance] : the appearance of the neck was normal [Jugular Venous Distention Increased] : there was no jugular-venous distention [Thyroid Diffuse Enlargement] : the thyroid was not enlarged [Neck Cervical Mass (___cm)] : no neck mass was observed [Heart Rate And Rhythm] : heart rate and rhythm were normal [Thyroid Nodule] : there were no palpable thyroid nodules [Heart Sounds] : normal S1 and S2 [Murmurs] : no murmurs present [Arterial Pulses Normal] : the arterial pulses were normal [Edema] : no peripheral edema present [Veins - Varicosity Changes] : no varicosital changes were noted in the lower extremities [Respiration, Rhythm And Depth] : normal respiratory rhythm and effort [Auscultation Breath Sounds / Voice Sounds] : lungs were clear to auscultation bilaterally [Lungs Percussion] : the lungs were normal to percussion [Exaggerated Use Of Accessory Muscles For Inspiration] : no accessory muscle use [Chest Palpation] : palpation of the chest revealed no abnormalities [Abdomen Soft] : soft [Bowel Sounds] : normal bowel sounds [Abdomen Tenderness] : non-tender [Abdomen Mass (___ Cm)] : no abdominal mass palpated [Abnormal Walk] : normal gait [Nail Clubbing] : no clubbing of the fingernails [Gait - Sufficient For Exercise Testing] : the gait was sufficient for exercise testing [Cyanosis, Localized] : no localized cyanosis [Petechial Hemorrhages (___cm)] : no petechial hemorrhages [Skin Color & Pigmentation] : normal skin color and pigmentation [] : no rash [No Venous Stasis] : no venous stasis [Skin Lesions] : no skin lesions [Deep Tendon Reflexes (DTR)] : deep tendon reflexes were 2+ and symmetric [No Skin Ulcers] : no skin ulcer [No Xanthoma] : no  xanthoma was observed [Sensation] : the sensory exam was normal to light touch and pinprick [Motor Exam] : the motor exam was normal [No Focal Deficits] : no focal deficits [Impaired Insight] : insight and judgment were intact [Oriented To Time, Place, And Person] : oriented to person, place, and time [Affect] : the affect was normal

## 2020-08-20 NOTE — PROCEDURE
[FreeTextEntry1] : NIOX 8  ppb August 20, 2020\par PFT without bronchodilator August 20, 2020\par Well-preserved flow rates mild obstructive pattern\par Normal lung volumes with borderline air trapping\par Specific conductance and resistance normal\par Reduction diffusion 65% of predicted with a loss of functioning alveolocapillary units\par Hemoglobin 14.9\par \par Pulmonary 6-minute walk stress test August 20, 2020\par Baseline room air O2 saturation 98%\par No desaturation\par Impression normal study\par \par \par \par Chest x-ray PA lateral projection January 3, 2019\par Cardiac size normal\par Hyper aeration\par Peribronchial cuffing\par No dominant pulmonary nodules parenchymal infiltrates pleural effusions\par Mediastinum normal\par Increased retrosternal airspace consistent with air-trapping\par Impression COPD\par \par PCV 23 1/2020

## 2020-08-20 NOTE — DISCUSSION/SUMMARY
[FreeTextEntry1] : COPD with bronchiectasis\par Low-dose CAT scan screening set for January 2020\par Active tobacco use\par Continue to advised on tobacco cessation\par he remain on Spiriva Asmanex as ordered\par PRN Pro Air follow-up 3 months\par Prevnair 1/2021\par Based on tobacco history and risk factors patient does qualify for low-dose CAT scan screening protocol.  Risks benefits of low-dose screening protocol discussed in detail with patient.\par All questions answered at today's visit.\par 3-7 minute discussion with patient about smoking cessation was initiated with patient showing interest in attempting to quit smoking. Problems with risks of continued tobacco smoking including respiratory, cardiovascular, and oncological problems were discussed. Advice on smoking cessation was reiterated including methods of quitting, setting a date to quit, and different medicines and support systems available for smoking cessation was discussed. Addressed  options including nicotine patches gums lozenges, Wellbutrin, Chantix as well as smoking cessation program.\par f/u  3 months\par \par

## 2020-08-20 NOTE — HISTORY OF PRESENT ILLNESS
[Current] : current [>= 30 pack years] : >= 30 pack years [Date: ___] : was performed [unfilled] [TextBox_11] : 1 [de-identified] : subcm pulmonary nodules/ apical scar [TextBox_13] : 35 [FreeTextEntry1] : in past did use Wellbutrin with some mild success but now actively smoking\par s/p URI\par Active respiratory symptoms\par Continues to smoke\par Addressed all questions regarding immunization profile\par No fevers chills or sweats\par No COVID-19 known primary contact exposures\par No lower extremity edema\par Denies chest pain chest tightness wheezing sputum production or hemoptysis\par \par

## 2020-09-01 VITALS — HEIGHT: 68 IN | WEIGHT: 135 LBS | BODY MASS INDEX: 20.46 KG/M2

## 2020-09-01 NOTE — ASSESSMENT
[Discussed Risks and Advised to Quit Smoking] : Discussed risks and advised to quit smoking [Discussed Cessation Strategies] : cessation strategies were discussed [Ready] : Patient is ready for cessation intervention [Preparation] : Preparation: The patient is getting ready to quit smoking [de-identified] : Dr. Avilez has ordered Chantix, and patient is planning on quitting with his wife (who is also a smoker).

## 2020-09-01 NOTE — PLAN
[Smoking Cessation] : smoking cessation [Age appropriate screenings] : Age appropriate screenings [Regular Exercise] : regular exercise [Regular follow-up with healthcare provider] : regular follow-up with healthcare provider [FreeTextEntry1] : His LDCT is scheduled for 9/3/20 at the [facility] location.  He will follow up with Dr. Avilez for the results.

## 2020-09-01 NOTE — HISTORY OF PRESENT ILLNESS
[Current] : current smoker [_____ pack-years] : [unfilled] pack-years [TextBox_13] : He denies hemoptysis, denies new cough, denies unexplained weight loss.\par He is a current smoker with a 35 pack year smoking history (1PPD x 35 years).  He denies family h/o lung cancer.  He is referred by Dr. Kenny Avilez.  This is a telephonic visit.\par

## 2020-09-03 ENCOUNTER — APPOINTMENT (OUTPATIENT)
Dept: CT IMAGING | Facility: IMAGING CENTER | Age: 65
End: 2020-09-03
Payer: MEDICARE

## 2020-09-03 ENCOUNTER — OUTPATIENT (OUTPATIENT)
Dept: OUTPATIENT SERVICES | Facility: HOSPITAL | Age: 65
LOS: 1 days | End: 2020-09-03
Payer: MEDICARE

## 2020-09-03 DIAGNOSIS — Z72.0 TOBACCO USE: ICD-10-CM

## 2020-09-03 PROCEDURE — G0297: CPT

## 2020-09-03 PROCEDURE — G0297: CPT | Mod: 26

## 2020-10-26 ENCOUNTER — RX RENEWAL (OUTPATIENT)
Age: 65
End: 2020-10-26

## 2020-11-09 ENCOUNTER — APPOINTMENT (OUTPATIENT)
Dept: CARDIOLOGY | Facility: CLINIC | Age: 65
End: 2020-11-09
Payer: MEDICARE

## 2020-11-09 ENCOUNTER — LABORATORY RESULT (OUTPATIENT)
Age: 65
End: 2020-11-09

## 2020-11-09 VITALS
WEIGHT: 131 LBS | DIASTOLIC BLOOD PRESSURE: 80 MMHG | SYSTOLIC BLOOD PRESSURE: 126 MMHG | BODY MASS INDEX: 19.85 KG/M2 | RESPIRATION RATE: 15 BRPM | HEART RATE: 70 BPM | HEIGHT: 68 IN

## 2020-11-09 PROCEDURE — ZZZZZ: CPT

## 2020-11-09 PROCEDURE — 93922 UPR/L XTREMITY ART 2 LEVELS: CPT

## 2020-11-09 PROCEDURE — 99213 OFFICE O/P EST LOW 20 MIN: CPT

## 2020-11-25 ENCOUNTER — APPOINTMENT (OUTPATIENT)
Dept: CARDIOLOGY | Facility: CLINIC | Age: 65
End: 2020-11-25

## 2020-12-21 PROBLEM — J06.9 ACUTE URI: Status: RESOLVED | Noted: 2019-09-05 | Resolved: 2020-12-21

## 2021-01-05 ENCOUNTER — APPOINTMENT (OUTPATIENT)
Dept: PULMONOLOGY | Facility: CLINIC | Age: 66
End: 2021-01-05
Payer: MEDICARE

## 2021-01-05 VITALS
SYSTOLIC BLOOD PRESSURE: 119 MMHG | OXYGEN SATURATION: 97 % | TEMPERATURE: 97.8 F | DIASTOLIC BLOOD PRESSURE: 70 MMHG | HEART RATE: 80 BPM

## 2021-01-05 PROCEDURE — 99214 OFFICE O/P EST MOD 30 MIN: CPT | Mod: 25

## 2021-01-05 PROCEDURE — 99406 BEHAV CHNG SMOKING 3-10 MIN: CPT

## 2021-01-05 RX ORDER — VARENICLINE TARTRATE 1 MG/1
1 TABLET, FILM COATED ORAL
Qty: 1 | Refills: 0 | Status: DISCONTINUED | COMMUNITY
Start: 2020-08-03 | End: 2021-01-05

## 2021-01-05 NOTE — DISCUSSION/SUMMARY
[FreeTextEntry1] : COPD with bronchiectasis\par Low-dose CAT scan screening set for January 2021\par Active tobacco use\par Continue to advised on tobacco cessation\par he remain on Spiriva Asmanex as ordered- OFF\par PRN Pro Air follow-up 3 months\par Prevnair 1/2021- states to get at [facility]\par \par 3-7 minute discussion with patient about smoking cessation was initiated with patient showing interest in attempting to quit smoking. Problems with risks of continued tobacco smoking including respiratory, cardiovascular, and oncological problems were discussed. Advice on smoking cessation was reiterated including methods of quitting, setting a date to quit, and different medicines and support systems available for smoking cessation was discussed. Addressed  options including nicotine patches gums lozenges, Wellbutrin, Chantix as well as smoking cessation program.\par f/u  3 months\par \par

## 2021-01-05 NOTE — HISTORY OF PRESENT ILLNESS
[>= 30 pack years] : >= 30 pack years [_____ pack-years] : [unfilled] pack-years [Date: ___] : was performed [unfilled] [Current] : is a current smoker [TextBox_11] : 1 [TextBox_13] : \par  [de-identified] : subcm pulmonary nodules/ apical scar [FreeTextEntry1] : side  effects with Chantix  Did  get Nicotine  patch which has  lepled from 1 ppd to  1/2  ppd over past  3  weeks\par in past did use Wellbutrin with some mild success but now actively smoking\par s/p URI\par No Active respiratory symptoms\par Continues to smoke\par Addressed all questions regarding immunization profile\par No fevers chills or sweats\par No COVID-19 known primary contact exposures\par No lower extremity edema\par Denies chest pain chest tightness wheezing sputum production or hemoptysis\par \par

## 2021-01-05 NOTE — REASON FOR VISIT
[Low-Dose CT Screening Discussion] : low-dose CT lung cancer screening discussion [Follow-Up] : a follow-up visit [COPD] : COPD [FreeTextEntry2] : Tobacco Use

## 2021-01-05 NOTE — PROCEDURE
[FreeTextEntry1] : Chest CT scan low-dose protocol September 3, 2020 Baseline study\par Did compared to studies of January 15, 2019 January 15, 2017\par Left upper lobe pulmonary nodule 3 mm no change\par Mild tracheobronchial secretions\par Adenopathy negative\par Impression lung RADS 2\par Annual screening low-dose chest CT 1 year\par \par \par NIOX 8  ppb August 20, 2020\par PFT without bronchodilator August 20, 2020\par Well-preserved flow rates mild obstructive pattern\par Normal lung volumes with borderline air trapping\par Specific conductance and resistance normal\par Reduction diffusion 65% of predicted with a loss of functioning alveolocapillary units\par Hemoglobin 14.9\par \par Pulmonary 6-minute walk stress test August 20, 2020\par Baseline room air O2 saturation 98%\par No desaturation\par Impression normal study\par \par Chest x-ray PA lateral projection January 3, 2019\par Cardiac size normal\par Hyper aeration\par Peribronchial cuffing\par No dominant pulmonary nodules parenchymal infiltrates pleural effusions\par Mediastinum normal\par Increased retrosternal airspace consistent with air-trapping\par Impression COPD\par \par PCV 23 1/2020

## 2021-01-05 NOTE — PLAN
[Smoking Cessation] : smoking cessation [Age appropriate screenings] : Age appropriate screenings [Regular Exercise] : regular exercise [Regular follow-up with healthcare provider] : regular follow-up with healthcare provider [FreeTextEntry1] : His LDCT is scheduled for 9/3/20 at the [facility] location.  He will follow up with Dr. Avilez for the results.

## 2021-01-05 NOTE — ASSESSMENT
[Discussed Risks and Advised to Quit Smoking] : Discussed risks and advised to quit smoking [Discussed Cessation Strategies] : cessation strategies were discussed [Ready] : Patient is ready for cessation intervention [Preparation] : Preparation: The patient is getting ready to quit smoking [de-identified] : Dr. Avilez has ordered Chantix, and patient is planning on quitting with his wife (who is also a smoker).

## 2021-02-01 ENCOUNTER — APPOINTMENT (OUTPATIENT)
Dept: PULMONOLOGY | Facility: CLINIC | Age: 66
End: 2021-02-01

## 2021-02-04 ENCOUNTER — APPOINTMENT (OUTPATIENT)
Dept: PULMONOLOGY | Facility: CLINIC | Age: 66
End: 2021-02-04

## 2021-02-09 ENCOUNTER — APPOINTMENT (OUTPATIENT)
Dept: CARDIOLOGY | Facility: CLINIC | Age: 66
End: 2021-02-09

## 2021-03-05 ENCOUNTER — APPOINTMENT (OUTPATIENT)
Dept: PULMONOLOGY | Facility: CLINIC | Age: 66
End: 2021-03-05

## 2021-04-02 ENCOUNTER — NON-APPOINTMENT (OUTPATIENT)
Age: 66
End: 2021-04-02

## 2021-04-22 ENCOUNTER — APPOINTMENT (OUTPATIENT)
Dept: PULMONOLOGY | Facility: CLINIC | Age: 66
End: 2021-04-22

## 2021-04-23 LAB — SARS-COV-2 N GENE NPH QL NAA+PROBE: NOT DETECTED

## 2021-04-27 ENCOUNTER — APPOINTMENT (OUTPATIENT)
Dept: PULMONOLOGY | Facility: CLINIC | Age: 66
End: 2021-04-27
Payer: MEDICARE

## 2021-04-27 VITALS
SYSTOLIC BLOOD PRESSURE: 106 MMHG | TEMPERATURE: 97.4 F | DIASTOLIC BLOOD PRESSURE: 67 MMHG | HEART RATE: 78 BPM | OXYGEN SATURATION: 99 %

## 2021-04-27 LAB — POCT - HEMOGLOBIN (HGB), QUANTITATIVE, TRANSCUTANEOUS: 13

## 2021-04-27 PROCEDURE — 94729 DIFFUSING CAPACITY: CPT

## 2021-04-27 PROCEDURE — 99406 BEHAV CHNG SMOKING 3-10 MIN: CPT | Mod: 25

## 2021-04-27 PROCEDURE — 94727 GAS DIL/WSHOT DETER LNG VOL: CPT

## 2021-04-27 PROCEDURE — 99214 OFFICE O/P EST MOD 30 MIN: CPT | Mod: 25

## 2021-04-27 PROCEDURE — 94010 BREATHING CAPACITY TEST: CPT

## 2021-04-27 PROCEDURE — 88738 HGB QUANT TRANSCUTANEOUS: CPT

## 2021-04-27 PROCEDURE — ZZZZZ: CPT

## 2021-04-27 PROCEDURE — 95012 NITRIC OXIDE EXP GAS DETER: CPT

## 2021-04-27 NOTE — PROCEDURE
[FreeTextEntry1] : NIOC  10 ppbn 4/27/21 NL\par PFT 4/27 21\par Mild OAD\par  Lung Volumes nl\par DLCO 68 % with minimal loss fx  alveolar  capillary units\par  HGB 13.0\par \par Chest CT scan low-dose protocol September 3, 2020 Baseline study\par Did compared to studies of January 15, 2019 January 15, 2017\par Left upper lobe pulmonary nodule 3 mm no change\par Mild tracheobronchial secretions\par Adenopathy negative\par Impression lung RADS 2\par Annual screening low-dose chest CT 1 year\par \par NIOX 8  ppb August 20, 2020\par PFT without bronchodilator August 20, 2020\par Well-preserved flow rates mild obstructive pattern\par Normal lung volumes with borderline air trapping\par Specific conductance and resistance normal\par Reduction diffusion 65% of predicted with a loss of functioning alveolocapillary units\par Hemoglobin 14.9\par \par Pulmonary 6-minute walk stress test August 20, 2020\par Baseline room air O2 saturation 98%\par No desaturation\par Impression normal study\par \par Chest x-ray PA lateral projection January 3, 2019\par Cardiac size normal\par Hyper aeration\par Peribronchial cuffing\par No dominant pulmonary nodules parenchymal infiltrates pleural effusions\par Mediastinum normal\par Increased retrosternal airspace consistent with air-trapping\par Impression COPD\par \par PCV 23 1/2020\par \par

## 2021-04-27 NOTE — HISTORY OF PRESENT ILLNESS
[Stable] : are stable [None] : ~He/She~ has no significant interval events [Difficulty Breathing During Exertion] : stable dyspnea on exertion [Feelings Of Weakness On Exertion] : stable exercise intolerance [Cough] : denies coughing [Wheezing] : denies wheezing [Regional Soft Tissue Swelling Both Lower Extremities] : denies lower extremity edema [Chest Pain Or Discomfort] : denies chest pain [Fever] : denies fever [Date: ___] : was performed [unfilled] [Wt Gain ___ Lbs] : no recent weight gain [Wt Loss ___ Lbs] : no recent weight loss [Oxygen] : the patient uses no supplemental oxygen [de-identified] : subcm pulmonary nodules/ apical scar [FreeTextEntry1] : side  effects with Chantix  Did  get Nicotine  patch which has  helped from 1 ppd to  1/2  ppd over past  3  weeks\par in past did use Wellbutrin with some mild success but now actively smoking\par s/p URI\par No Active respiratory symptoms\par Continues to smoke\par Addressed all questions regarding immunization profile\par No fevers chills or sweats\par No COVID-19 known primary contact exposures\par No lower extremity edema\par Denies chest pain chest tightness wheezing sputum production or hemoptysis\par \par

## 2021-04-27 NOTE — HISTORY OF PRESENT ILLNESS
[Stable] : are stable [None] : ~He/She~ has no significant interval events [Difficulty Breathing During Exertion] : stable dyspnea on exertion [Feelings Of Weakness On Exertion] : stable exercise intolerance [Cough] : denies coughing [Wheezing] : denies wheezing [Regional Soft Tissue Swelling Both Lower Extremities] : denies lower extremity edema [Chest Pain Or Discomfort] : denies chest pain [Fever] : denies fever [Date: ___] : was performed [unfilled] [Wt Gain ___ Lbs] : no recent weight gain [Wt Loss ___ Lbs] : no recent weight loss [Oxygen] : the patient uses no supplemental oxygen [de-identified] : subcm pulmonary nodules/ apical scar [FreeTextEntry1] : side  effects with Chantix  Did  get Nicotine  patch which has  helped from 1 ppd to  1/2  ppd over past  3  weeks\par in past did use Wellbutrin with some mild success but now actively smoking\par s/p URI\par No Active respiratory symptoms\par Continues to smoke\par Addressed all questions regarding immunization profile\par No fevers chills or sweats\par No COVID-19 known primary contact exposures\par No lower extremity edema\par Denies chest pain chest tightness wheezing sputum production or hemoptysis\par \par

## 2021-04-27 NOTE — DISCUSSION/SUMMARY
[FreeTextEntry1] : COPD with bronchiectasis\par Active tobacco use\par Continue to advised on tobacco cessation\par he remain on Spiriva Asmanex as ordered- OFF\par PRN Pro Air follow-up 3 months\par Prevnair 1/2021- states to get at [facility]\par \par 3-7 minute discussion with patient about smoking cessation was initiated with patient showing interest in attempting to quit smoking. Problems with risks of continued tobacco smoking including respiratory, cardiovascular, and oncological problems were discussed. Advice on smoking cessation was reiterated including methods of quitting, setting a date to quit, and different medicines and support systems available for smoking cessation was discussed. Addressed  options including nicotine patches gums lozenges, Wellbutrin, Chantix as well as smoking cessation program.\par f/u  3 months\par \par

## 2021-05-25 ENCOUNTER — LABORATORY RESULT (OUTPATIENT)
Age: 66
End: 2021-05-25

## 2021-05-25 ENCOUNTER — APPOINTMENT (OUTPATIENT)
Dept: CARDIOLOGY | Facility: CLINIC | Age: 66
End: 2021-05-25
Payer: MEDICARE

## 2021-05-25 ENCOUNTER — NON-APPOINTMENT (OUTPATIENT)
Age: 66
End: 2021-05-25

## 2021-05-25 VITALS
TEMPERATURE: 97.9 F | DIASTOLIC BLOOD PRESSURE: 70 MMHG | OXYGEN SATURATION: 97 % | WEIGHT: 131 LBS | HEART RATE: 89 BPM | RESPIRATION RATE: 16 BRPM | HEIGHT: 68 IN | BODY MASS INDEX: 19.85 KG/M2 | SYSTOLIC BLOOD PRESSURE: 100 MMHG

## 2021-05-25 DIAGNOSIS — N40.0 BENIGN PROSTATIC HYPERPLASIA WITHOUT LOWER URINARY TRACT SYMPMS: ICD-10-CM

## 2021-05-25 PROCEDURE — 99214 OFFICE O/P EST MOD 30 MIN: CPT

## 2021-05-25 PROCEDURE — 93000 ELECTROCARDIOGRAM COMPLETE: CPT

## 2021-05-25 RX ORDER — ALFUZOSIN HYDROCHLORIDE 10 MG/1
10 TABLET, EXTENDED RELEASE ORAL DAILY
Refills: 0 | Status: ACTIVE | COMMUNITY
Start: 2021-05-25

## 2021-05-25 NOTE — PHYSICAL EXAM
[Well Developed] : well developed [Well Nourished] : well nourished [No Acute Distress] : no acute distress [Normal Venous Pressure] : normal venous pressure [No Carotid Bruit] : no carotid bruit [Normal S1, S2] : normal S1, S2 [No Murmur] : no murmur [No Rub] : no rub [Clear Lung Fields] : clear lung fields [Good Air Entry] : good air entry [No Respiratory Distress] : no respiratory distress  [Soft] : abdomen soft [Non Tender] : non-tender [No Masses/organomegaly] : no masses/organomegaly [Normal Bowel Sounds] : normal bowel sounds [Normal Gait] : normal gait [No Edema] : no edema [No Cyanosis] : no cyanosis [No Clubbing] : no clubbing [No Varicosities] : no varicosities [No Rash] : no rash [No Skin Lesions] : no skin lesions [Moves all extremities] : moves all extremities [No Focal Deficits] : no focal deficits [Normal Speech] : normal speech [Alert and Oriented] : alert and oriented [Normal memory] : normal memory [General Appearance - Well Developed] : well developed [Normal Appearance] : normal appearance [Well Groomed] : well groomed [No Deformities] : no deformities [General Appearance - Well Nourished] : well nourished [General Appearance - In No Acute Distress] : no acute distress [Normal Conjunctiva] : the conjunctiva exhibited no abnormalities [Normal Oral Mucosa] : normal oral mucosa [Normal Jugular Venous A Waves Present] : normal jugular venous A waves present [Normal Jugular Venous V Waves Present] : normal jugular venous V waves present [No Jugular Venous Kramer A Waves] : no jugular venous kramer A waves [Exaggerated Use Of Accessory Muscles For Inspiration] : no accessory muscle use [Respiration, Rhythm And Depth] : normal respiratory rhythm and effort [Auscultation Breath Sounds / Voice Sounds] : lungs were clear to auscultation bilaterally [Bowel Sounds] : normal bowel sounds [Abdomen Soft] : soft [Abnormal Walk] : normal gait [Gait - Sufficient For Exercise Testing] : the gait was sufficient for exercise testing [Nail Clubbing] : no clubbing of the fingernails [Cyanosis, Localized] : no localized cyanosis [Skin Color & Pigmentation] : normal skin color and pigmentation [Skin Turgor] : normal skin turgor [] : no rash [Oriented To Time, Place, And Person] : oriented to person, place, and time [Affect] : the affect was normal [Mood] : the mood was normal [No Anxiety] : not feeling anxious [5th Left ICS - MCL] : palpated at the 5th LICS in the midclavicular line [Normal] : normal [No Precordial Heave] : no precordial heave was noted [Normal Rate] : normal [Rhythm Regular] : regular [Normal S1] : normal S1 [Normal S2] : normal S2 [No Gallop] : no gallop heard [II] : a grade 2 [2+] : left 2+ [No Pitting Edema] : no pitting edema present [FreeTextEntry1] : left bruit vs. transmitted murmur [Click] : no click [Pericardial Rub] : no pericardial rub

## 2021-05-25 NOTE — CARDIOLOGY SUMMARY
[___] : [unfilled] [de-identified] : 5/25/2021 [de-identified] : NUC 4/30/2018 [de-identified] : 3/5/2019 [de-identified] : CAROTID DOPPLER: 3/5/2019\par NURYS: 3/5/2019

## 2021-05-25 NOTE — DISCUSSION/SUMMARY
[FreeTextEntry1] : Dr. Brooks-(PRIOR VISIT and PMH WITH Dr. Brooks): \par This is a 65-year-old male with past medical history significant for hyperlipidemia, status post cholecystectomy, coronary artery calcification, who comes in for cardiac follow up evaluation. \par The patient denies chest pain, shortness of breath, dizziness or syncope.\par   \par He had a normal nuclear stress test in 2018. \par \par  He does complain of occasional cramping in his legs.\par He will have an ankle-brachial index done today to rule out significant peripheral arterial disease given the presence of coronary artery calcification and history of smoking.\par \par Blood work done June 9, 2020 demonstrated cholesterol 142, triglycerides 121, HDL of 37, and direct LDL of 88 mg/dL.\par The patient will have new blood work today to reevaluate his lipid panel.  He is currently hemodynamically stable and asymptomatic from a cardiac standpoint.\par \par Electrocardiogram done June 9, 2020 which demonstrated normal sinus rhythm rate 80 beats per minute otherwise remarkable for left axis deviation and left atrial abnormality..\par \par  The patient will continue on his current dose of Zetia 10 mg per day in addition to his Crestor 20 mg per day.  \par He will follow up with you regarding his overall medical care prior to followup with me in 3 months.\par \par  Cardiac risk factors include hyperlipidemia, and smoking a half a pack to one pack per day over the last 35 years.\par \par The patient had a coronary calcium score done February 26, 2019 which demonstrated a total coronary calcium level of 129 units, all in the left anterior descending artery.\par \par The patient had a nuclear stress test done by his prior cardiologist April 30, 2018 which demonstrated normal coronary perfusion with an estimated ejection fraction 61% and normal wall motion.\par \par The patient is instructed to followup with his pulmonologist regarding his smoking cessation.  Smoking cessation was reinforced. \par \par The patient at chest CAT scan in 2017 which demonstrated coronary artery calcification. A new CAT scan done January 15, 2019 confirm the presence of coronary atherosclerosis. There was no detailed definition regarding extent the location of the coronary calcification.\par \par The patient understands that aerobic exercises must be increased to 40 minutes 4 times per week. A detailed discussion of lifestyle modification was done today. The patient has a good understanding of the diagnosis, and treatment plan. Lifestyle modification was also outlined.

## 2021-05-25 NOTE — REASON FOR VISIT
[CV Risk Factors and Non-Cardiac Disease] : CV risk factors and non-cardiac disease [Follow-Up - Clinic] : a clinic follow-up of [Hyperlipidemia] : hyperlipidemia [FreeTextEntry1] : murmur, coronary calcification

## 2021-05-25 NOTE — ASSESSMENT
[FreeTextEntry1] : This is a 65 year year old male here today for follow up cardiac evaluation. \par He has a past medical history significant for hyperlipidemia, status post cholecystectomy, coronary artery calcification.\par \par Today he is feeling generally well and does not have any complaints at this time. He wants to make a note that he is following up with his urologist in regards to his possible BPH.\par \par -He states that he has been helping his wife who was recently dx with breast Ca and is going through treatment at this time. \par \par -Pt is stable from a cardiac standpoint and does not have any complaints at this time. \par \par -Cardiac risk factors include HLD and hx of smoker.\par \par BLOOD PRESSURE:\par -BP is well controlled in today's visit.\par \par BLOOD WORK:\par -New blood work was done today, 05/25/2021 to evaluate lipid profile, CBC, BMP, hepatic function, A1C and TSH. Pt currently taking Rosuvastatin 20mg PO DAILY and on Zetia 10mg  PO Daily.\par \par CHOLESTEROL CONTROL:\par -Patient will continue the advised  TLC diet and to continue follow-up for treatment of hyperlipidemia and repeat blood testing with diet and exercise. I have discussed different exercises and the importance of maintenance of optimal body weight. The importance of staying within guidelines and recommendations was stressed to the patient today and they acknowledged that they understand this to me verbally.\par \par  -Mr. COWAN was educated and advised that failure to follow-up with my medical recommendations to lower cholesterol can result in severe health consequences therefore, they will continuing a low saturated and low fat diet and to avoid excessive carbohydrates to help reduce triglycerides and that lowering LDL levels is associated with a significant decrease in serious cardiac events including but not limited to heart attack stroke and overall death. We will continue lipid lowering agents as advised based on blood test results and the patient understands to call if they develop severe muscle discomfort or if they have a reddish tinted discoloration in their urine.\par \par TESTING/REPORTS:\par -Electrocardiogram done 5/25/2021 which demonstrated normal sinus rhythm rate 84 beats per minute otherwise remarkable for left axis deviation and left atrial abnormality.\par \par -The patient at chest CAT scan in 2017 which demonstrated coronary artery calcification. A new CAT scan done January 15, 2019 confirm the presence of coronary atherosclerosis. There was no detailed definition regarding extent the location of the coronary calcification.\par \par -The patient had a nuclear stress test done by his prior cardiologist April 30, 2018 which demonstrated normal coronary perfusion with an estimated ejection fraction 61% and normal wall motion.\par \par -The patient had a coronary calcium score done February 26, 2019 which demonstrated a total coronary calcium level of 129 units, all in the left anterior descending artery.\par \par -Echocardiogram done 3/5/2019 demonstrated mild mitral tricuspid and pulmonic regurgitation with normal left ventricular systolic function. EF 63%.\par \par PLAN:\par -The patient will schedule an Echo Doppler examination to evaluate murmur, left ventricular function, chamber size, and rule out hypertrophy.\par -He will continue with his usual medications and will contact the office if he is having any complaints between now and their next follow up appointment.\par -He will follow up with his urologist.\par \par I have discussed the plan of care with Mr. KAL COWAN  and he  will follow up in 3 months. He is compliant with all of his medications.\par \par The patient understands that aerobic exercises must be increased to minutes 4 times/week and a detailed discussion of lifestyle modification was done today. \par The patient has a good understanding of the diagnosis, treatment plan and lifestyle modification. \par He will contact me at the office for any questions with their care or any changes in their health status.\par \par The patient was seen together with supervision physician Dr. Jacques Brooks and the plan of care was discussed with the patient and will be carried out as noted above.\par \par Mark CABRAL

## 2021-06-08 ENCOUNTER — APPOINTMENT (OUTPATIENT)
Dept: PULMONOLOGY | Facility: CLINIC | Age: 66
End: 2021-06-08

## 2021-07-22 ENCOUNTER — APPOINTMENT (OUTPATIENT)
Dept: PULMONOLOGY | Facility: CLINIC | Age: 66
End: 2021-07-22

## 2021-07-27 ENCOUNTER — APPOINTMENT (OUTPATIENT)
Dept: PULMONOLOGY | Facility: CLINIC | Age: 66
End: 2021-07-27
Payer: MEDICARE

## 2021-07-27 ENCOUNTER — RX RENEWAL (OUTPATIENT)
Age: 66
End: 2021-07-27

## 2021-07-27 VITALS
BODY MASS INDEX: 20 KG/M2 | TEMPERATURE: 98.3 F | RESPIRATION RATE: 15 BRPM | DIASTOLIC BLOOD PRESSURE: 62 MMHG | HEART RATE: 82 BPM | WEIGHT: 132 LBS | HEIGHT: 68 IN | SYSTOLIC BLOOD PRESSURE: 103 MMHG | OXYGEN SATURATION: 97 %

## 2021-07-27 PROCEDURE — 94727 GAS DIL/WSHOT DETER LNG VOL: CPT

## 2021-07-27 PROCEDURE — 99214 OFFICE O/P EST MOD 30 MIN: CPT | Mod: 25

## 2021-07-27 PROCEDURE — 95012 NITRIC OXIDE EXP GAS DETER: CPT

## 2021-07-27 PROCEDURE — ZZZZZ: CPT

## 2021-07-27 PROCEDURE — 94729 DIFFUSING CAPACITY: CPT

## 2021-07-27 PROCEDURE — 94010 BREATHING CAPACITY TEST: CPT

## 2021-07-27 PROCEDURE — 71046 X-RAY EXAM CHEST 2 VIEWS: CPT

## 2021-07-27 NOTE — PHYSICAL EXAM
[General Appearance - Well Developed] : well developed [Normal Appearance] : normal appearance [Well Groomed] : well groomed [General Appearance - Well Nourished] : well nourished [No Deformities] : no deformities [General Appearance - In No Acute Distress] : no acute distress [Normal Conjunctiva] : the conjunctiva exhibited no abnormalities [Eyelids - No Xanthelasma] : the eyelids demonstrated no xanthelasmas [Normal Oropharynx] : normal oropharynx [I] : I [Neck Appearance] : the appearance of the neck was normal [Neck Cervical Mass (___cm)] : no neck mass was observed [Jugular Venous Distention Increased] : there was no jugular-venous distention [Thyroid Diffuse Enlargement] : the thyroid was not enlarged [Thyroid Nodule] : there were no palpable thyroid nodules [Heart Rate And Rhythm] : heart rate and rhythm were normal [Heart Sounds] : normal S1 and S2 [Murmurs] : no murmurs present [Arterial Pulses Normal] : the arterial pulses were normal [Edema] : no peripheral edema present [Veins - Varicosity Changes] : no varicosital changes were noted in the lower extremities [Respiration, Rhythm And Depth] : normal respiratory rhythm and effort [Exaggerated Use Of Accessory Muscles For Inspiration] : no accessory muscle use [Auscultation Breath Sounds / Voice Sounds] : lungs were clear to auscultation bilaterally [Chest Palpation] : palpation of the chest revealed no abnormalities [Lungs Percussion] : the lungs were normal to percussion [Bowel Sounds] : normal bowel sounds [Abdomen Soft] : soft [Abdomen Tenderness] : non-tender [Abdomen Mass (___ Cm)] : no abdominal mass palpated [Abnormal Walk] : normal gait [Gait - Sufficient For Exercise Testing] : the gait was sufficient for exercise testing [Nail Clubbing] : no clubbing of the fingernails [Cyanosis, Localized] : no localized cyanosis [Petechial Hemorrhages (___cm)] : no petechial hemorrhages [Skin Color & Pigmentation] : normal skin color and pigmentation [No Venous Stasis] : no venous stasis [] : no rash [Skin Lesions] : no skin lesions [No Skin Ulcers] : no skin ulcer [No Xanthoma] : no  xanthoma was observed [Sensation] : the sensory exam was normal to light touch and pinprick [Deep Tendon Reflexes (DTR)] : deep tendon reflexes were 2+ and symmetric [Motor Exam] : the motor exam was normal [No Focal Deficits] : no focal deficits [Oriented To Time, Place, And Person] : oriented to person, place, and time [Impaired Insight] : insight and judgment were intact [Affect] : the affect was normal

## 2021-07-27 NOTE — HISTORY OF PRESENT ILLNESS
[Stable] : are stable [None] : ~He/She~ has no significant interval events [Difficulty Breathing During Exertion] : stable dyspnea on exertion [Feelings Of Weakness On Exertion] : stable exercise intolerance [Cough] : denies coughing [Wheezing] : denies wheezing [Regional Soft Tissue Swelling Both Lower Extremities] : denies lower extremity edema [Chest Pain Or Discomfort] : denies chest pain [Fever] : denies fever [Date: ___] : was performed [unfilled] [Wt Gain ___ Lbs] : no recent weight gain [Wt Loss ___ Lbs] : no recent weight loss [Oxygen] : the patient uses no supplemental oxygen [de-identified] : subcm pulmonary nodules/ apical scar [FreeTextEntry1] : side  effects with Chantix  Did  get Nicotine  patch which has  helped from 1 ppd to  1/2  ppd over past  3  weeks\par in past did use Wellbutrin with some mild success but now actively smoking\par No Active respiratory symptoms\par Continues to smoke\par Addressed all questions regarding immunization profile\par No fevers chills or sweats\par No COVID-19 known primary contact exposures\par No lower extremity edema\par Denies chest pain chest tightness wheezing sputum production or hemoptysis\par \par

## 2021-07-27 NOTE — PROCEDURE
[FreeTextEntry1] : PFT 7/27/21\par Well preserved flow rates\par  Mild OAD\par  Lung Volumes nl\par  Low nl DLCL 73 % \par  HGB 13.0\par  stable pulmonary physiology\par NIOX 11 ppb  nl range\par \par X-ray PA lateral September 27, 2021\par Cardiac size normal\par Grossly clear lung fields\par No appreciable dominant pulmonary nodules\par Maritza mediastinum unremarkable\par Soft tissue bony structures unremarkable\par No pneumothorax\par Increased retrosternal airspace with flattening of the diaphragms consistent with hyper aeration\par Impression COPD\par \par NIOC  10 ppb  4/27/21 NL\par PFT 4/27 21\par Mild OAD\par  Lung Volumes nl\par DLCO 68 % with minimal loss fx  alveolar  capillary units\par  HGB 13.0\par \par Chest CT scan low-dose protocol September 3, 2020 Baseline study\par Did compared to studies of January 15, 2019 January 15, 2017\par Left upper lobe pulmonary nodule 3 mm no change\par Mild tracheobronchial secretions\par Adenopathy negative\par Impression lung RADS 2\par Annual screening low-dose chest CT 1 year\par \par NIOX 8  ppb August 20, 2020\par PFT without bronchodilator August 20, 2020\par Well-preserved flow rates mild obstructive pattern\par Normal lung volumes with borderline air trapping\par Specific conductance and resistance normal\par Reduction diffusion 65% of predicted with a loss of functioning alveolocapillary units\par Hemoglobin 14.9\par \par Pulmonary 6-minute walk stress test August 20, 2020\par Baseline room air O2 saturation 98%\par No desaturation\par Impression normal study\par \par Chest x-ray PA lateral projection January 3, 2019\par Cardiac size normal\par Hyper aeration\par Peribronchial cuffing\par No dominant pulmonary nodules parenchymal infiltrates pleural effusions\par Mediastinum normal\par Increased retrosternal airspace consistent with air-trapping\par Impression COPD\par \par PCV 23 1/2020\par Shringrx up to date\par Check re Prevnair\par

## 2021-07-27 NOTE — DISCUSSION/SUMMARY
[FreeTextEntry1] : COPD with bronchiectasis\par Active tobacco use\par Continue to advised on tobacco cessation\par he remain on Spiriva Asmanex as ordered- OFF\par PRN Pro Air follow-up 3 months\par Prevnair 1/2021- states to get at [facility]\par \par 3-7 minute discussion with patient about smoking cessation was initiated with patient showing interest in attempting to quit smoking. Problems with risks of continued tobacco smoking including respiratory, cardiovascular, and oncological problems were discussed. Advice on smoking cessation was reiterated including methods of quitting, setting a date to quit, and different medicines and support systems available for smoking cessation was discussed. Addressed  options including nicotine patches gums lozenges, Wellbutrin, Chantix as well as smoking cessation program.\par f/u  3 months\par \par Follow-up PFT\par Follow-up 6-minute walk\par September 2021 low-dose CAT scan screening follow-up protocol\par \par Cardiology f/u\par Prevnair to check with  Pharm  re status-Prevnar 13 April 13, 2021\par Administered April 13, 2021

## 2021-08-30 ENCOUNTER — NON-APPOINTMENT (OUTPATIENT)
Age: 66
End: 2021-08-30

## 2021-09-30 ENCOUNTER — APPOINTMENT (OUTPATIENT)
Dept: THORACIC SURGERY | Facility: CLINIC | Age: 66
End: 2021-09-30
Payer: MEDICARE

## 2021-09-30 ENCOUNTER — APPOINTMENT (OUTPATIENT)
Dept: THORACIC SURGERY | Facility: CLINIC | Age: 66
End: 2021-09-30

## 2021-09-30 ENCOUNTER — NON-APPOINTMENT (OUTPATIENT)
Age: 66
End: 2021-09-30

## 2021-09-30 DIAGNOSIS — Z12.2 ENCOUNTER FOR SCREENING FOR MALIGNANT NEOPLASM OF RESPIRATORY ORGANS: ICD-10-CM

## 2021-09-30 PROCEDURE — 99406 BEHAV CHNG SMOKING 3-10 MIN: CPT

## 2021-09-30 PROCEDURE — G0296 VISIT TO DETERM LDCT ELIG: CPT

## 2021-10-03 VITALS — BODY MASS INDEX: 20.46 KG/M2 | WEIGHT: 135 LBS | HEIGHT: 68 IN

## 2021-10-03 PROBLEM — Z12.2 ENCOUNTER FOR SCREENING FOR LUNG CANCER: Status: ACTIVE | Noted: 2021-10-03

## 2021-10-04 ENCOUNTER — APPOINTMENT (OUTPATIENT)
Dept: CT IMAGING | Facility: IMAGING CENTER | Age: 66
End: 2021-10-04
Payer: MEDICARE

## 2021-10-04 ENCOUNTER — NON-APPOINTMENT (OUTPATIENT)
Age: 66
End: 2021-10-04

## 2021-10-04 ENCOUNTER — OUTPATIENT (OUTPATIENT)
Dept: OUTPATIENT SERVICES | Facility: HOSPITAL | Age: 66
LOS: 1 days | End: 2021-10-04
Payer: MEDICARE

## 2021-10-04 DIAGNOSIS — F17.200 NICOTINE DEPENDENCE, UNSPECIFIED, UNCOMPLICATED: ICD-10-CM

## 2021-10-04 PROCEDURE — 71271 CT THORAX LUNG CANCER SCR C-: CPT | Mod: 26

## 2021-10-04 PROCEDURE — 71271 CT THORAX LUNG CANCER SCR C-: CPT

## 2021-10-07 NOTE — PLAN
[FreeTextEntry1] : His LDCT is scheduled for 10/4/21 at the [facility] location.  He will follow up with Dr. Avilez for the resutls.\par

## 2021-10-07 NOTE — HISTORY OF PRESENT ILLNESS
[TextBox_13] : He denies hemoptysis, denies new cough, denies unexplained weight loss.\par He is a current smoker with a 36 pack year smoking history (1 PPD x 35 years then 0.5PPD x 1 year).  He is referred by Dr Kenny Avilez.

## 2021-10-19 ENCOUNTER — LABORATORY RESULT (OUTPATIENT)
Age: 66
End: 2021-10-19

## 2021-10-19 ENCOUNTER — APPOINTMENT (OUTPATIENT)
Dept: CARDIOLOGY | Facility: CLINIC | Age: 66
End: 2021-10-19
Payer: MEDICARE

## 2021-10-19 ENCOUNTER — NON-APPOINTMENT (OUTPATIENT)
Age: 66
End: 2021-10-19

## 2021-10-19 VITALS
HEIGHT: 68 IN | BODY MASS INDEX: 20.31 KG/M2 | TEMPERATURE: 97.9 F | WEIGHT: 134 LBS | SYSTOLIC BLOOD PRESSURE: 110 MMHG | HEART RATE: 89 BPM | OXYGEN SATURATION: 97 % | DIASTOLIC BLOOD PRESSURE: 68 MMHG | RESPIRATION RATE: 16 BRPM

## 2021-10-19 PROCEDURE — 99214 OFFICE O/P EST MOD 30 MIN: CPT | Mod: 25

## 2021-10-19 PROCEDURE — 93000 ELECTROCARDIOGRAM COMPLETE: CPT

## 2021-10-19 PROCEDURE — 93306 TTE W/DOPPLER COMPLETE: CPT

## 2021-10-19 NOTE — PHYSICAL EXAM
[FreeTextEntry1] : left bruit vs. transmitted murmur [Click] : no click [Pericardial Rub] : no pericardial rub

## 2021-10-19 NOTE — CARDIOLOGY SUMMARY
[de-identified] : 5/25/2021 [de-identified] : NUC 4/30/2018 [de-identified] : 3/5/2019 [de-identified] : CAROTID DOPPLER: 3/5/2019\par NURYS: 3/5/2019

## 2021-10-19 NOTE — ASSESSMENT
[FreeTextEntry1] : This is a 66 year year old male here today for follow up cardiac evaluation. \par He has a past medical history significant for hyperlipidemia, status post cholecystectomy, coronary artery calcification.\par \par Today he is feeling generally well and does not have any complaints at this time. He wants to make a note that he is following up with his urologist in regards to his BPH, Dr. Johnston. \par \par -He states that he has been helping his wife who was recently dx with breast Ca and is going through treatment at this time. \par \par -Pt is stable from a cardiac standpoint and does not have any complaints at this time. \par \par -Cardiac risk factors include HLD and hx of smoker.\par \par BLOOD PRESSURE:\par -BP is well controlled in today's visit.\par \par BLOOD WORK:\par -New blood work was done today, 10/19/2021  to evaluate lipid profile, CBC, BMP, hepatic function, A1C and TSH. \par -Blood work was done 05/25/2021 demonstrated normal lipid profile. \par -Pt currently taking Rosuvastatin 20mg PO DAILY and on Zetia 10mg  PO Daily.\par \par CHOLESTEROL CONTROL:\par -Patient will continue the advised  TLC diet and to continue follow-up for treatment of hyperlipidemia and repeat blood testing with diet and exercise. I have discussed different exercises and the importance of maintenance of optimal body weight. The importance of staying within guidelines and recommendations was stressed to the patient today and they acknowledged that they understand this to me verbally.\par \par  -Mr. COWAN was educated and advised that failure to follow-up with my medical recommendations to lower cholesterol can result in severe health consequences therefore, they will continuing a low saturated and low fat diet and to avoid excessive carbohydrates to help reduce triglycerides and that lowering LDL levels is associated with a significant decrease in serious cardiac events including but not limited to heart attack stroke and overall death. We will continue lipid lowering agents as advised based on blood test results and the patient understands to call if they develop severe muscle discomfort or if they have a reddish tinted discoloration in their urine.\par \par TESTING/REPORTS:\par -Electrocardiogram done 5/25/2021 which demonstrated normal sinus rhythm rate 84 beats per minute otherwise remarkable for left axis deviation and left atrial abnormality.\par \par -The patient at chest CAT scan in 2017 which demonstrated coronary artery calcification. A new CAT scan done January 15, 2019 confirm the presence of coronary atherosclerosis. There was no detailed definition regarding extent the location of the coronary calcification.\par \par -The patient had a nuclear stress test done by his prior cardiologist April 30, 2018 which demonstrated normal coronary perfusion with an estimated ejection fraction 61% and normal wall motion.\par \par -The patient had a coronary calcium score done February 26, 2019 which demonstrated a total coronary calcium level of 129 units, all in the left anterior descending artery.\par \par -Echocardiogram done 3/5/2019 demonstrated mild mitral tricuspid and pulmonic regurgitation with normal left ventricular systolic function. EF 63%.\par \par -Echocardiogram done today 10/19/2021 and results are pending.\par \par PLAN:\par -He will continue with his usual medications and will contact the office if he is having any complaints between now and their next follow up appointment.\par -He will follow up with his urologist.\par \par I have discussed the plan of care with Mr. KAL COWAN  and he  will follow up in 3 months. He is compliant with all of his medications.\par \par The patient understands that aerobic exercises must be increased to minutes 4 times/week and a detailed discussion of lifestyle modification was done today. \par The patient has a good understanding of the diagnosis, treatment plan and lifestyle modification. \par He will contact me at the office for any questions with their care or any changes in their health status.\par \par The patient was seen together with supervision physician Dr. Jacques Brooks and the plan of care was discussed with the patient and will be carried out as noted above.\par \par Mark CABRAL

## 2021-10-26 ENCOUNTER — APPOINTMENT (OUTPATIENT)
Dept: PULMONOLOGY | Facility: CLINIC | Age: 66
End: 2021-10-26
Payer: MEDICARE

## 2021-10-26 VITALS
OXYGEN SATURATION: 98 % | HEART RATE: 99 BPM | SYSTOLIC BLOOD PRESSURE: 140 MMHG | DIASTOLIC BLOOD PRESSURE: 71 MMHG | TEMPERATURE: 96.8 F

## 2021-10-26 PROCEDURE — 99214 OFFICE O/P EST MOD 30 MIN: CPT

## 2021-10-26 NOTE — HISTORY OF PRESENT ILLNESS
[Stable] : are stable [None] : ~He/She~ has no significant interval events [Difficulty Breathing During Exertion] : stable dyspnea on exertion [Feelings Of Weakness On Exertion] : stable exercise intolerance [Cough] : denies coughing [Wheezing] : denies wheezing [Regional Soft Tissue Swelling Both Lower Extremities] : denies lower extremity edema [Chest Pain Or Discomfort] : denies chest pain [Fever] : denies fever [Date: ___] : was performed [unfilled] [Wt Gain ___ Lbs] : no recent weight gain [Wt Loss ___ Lbs] : no recent weight loss [Oxygen] : the patient uses no supplemental oxygen [de-identified] : subcm pulmonary nodules/ apical scar [FreeTextEntry1] : side  effects with Chantix  Did  get Nicotine  patch which has  helped from 1 ppd to  1/2  ppd over past  3  weeks\par in past did use Wellbutrin with some mild success but now actively smoking\par No Active respiratory symptoms\par Continues to smoke\par Addressed all questions regarding immunization profile\par No fevers chills or sweats\par No COVID-19 known primary contact exposures\par No lower extremity edema\par Denies chest pain chest tightness wheezing sputum production or hemoptysis\par \par

## 2021-10-26 NOTE — PROCEDURE
[FreeTextEntry1] : CT low-dose screening\par October 4, 2021\par Lung RADS 2\par Stable few small pulmonary nodules findings\par Mild secretions in the trachea\par 1 year follow-up recommended\par \par PFT 7/27/21\par Well preserved flow rates\par  Mild OAD\par  Lung Volumes nl\par  Low nl DLCL 73 % \par  HGB 13.0\par  stable pulmonary physiology\par NIOX 11 ppb  nl range\par \par X-ray PA lateral September 27, 2021\par Cardiac size normal\par Grossly clear lung fields\par No appreciable dominant pulmonary nodules\par Maritza mediastinum unremarkable\par Soft tissue bony structures unremarkable\par No pneumothorax\par Increased retrosternal airspace with flattening of the diaphragms consistent with hyper aeration\par Impression COPD\par \par NIOC  10 ppb  4/27/21 NL\par PFT 4/27 21\par Mild OAD\par  Lung Volumes nl\par DLCO 68 % with minimal loss fx  alveolar  capillary units\par  HGB 13.0\par \par Chest CT scan low-dose protocol September 3, 2020 Baseline study\par Did compared to studies of January 15, 2019 January 15, 2017\par Left upper lobe pulmonary nodule 3 mm no change\par Mild tracheobronchial secretions\par Adenopathy negative\par Impression lung RADS 2\par Annual screening low-dose chest CT 1 year\par \par NIOX 8  ppb August 20, 2020\par PFT without bronchodilator August 20, 2020\par Well-preserved flow rates mild obstructive pattern\par Normal lung volumes with borderline air trapping\par Specific conductance and resistance normal\par Reduction diffusion 65% of predicted with a loss of functioning alveolocapillary units\par Hemoglobin 14.9\par \par Pulmonary 6-minute walk stress test August 20, 2020\par Baseline room air O2 saturation 98%\par No desaturation\par Impression normal study\par \par Chest x-ray PA lateral projection January 3, 2019\par Cardiac size normal\par Hyper aeration\par Peribronchial cuffing\par No dominant pulmonary nodules parenchymal infiltrates pleural effusions\par Mediastinum normal\par Increased retrosternal airspace consistent with air-trapping\par Impression COPD\par \par PCV 23 1/2020\par Shringrx up to date\par Check re Prevnair\par

## 2021-10-26 NOTE — DISCUSSION/SUMMARY
[FreeTextEntry1] : COPD with bronchiectasis\par Active tobacco use\par Continue to advised on tobacco cessation\par he remain on Spiriva Asmanex as ordered- OFF\par PRN Pro Air follow-up 3 months\par Prevnair 1/2021- states to get at [facility]\par PCV 23 Jan 2022\par Flu vaccine up to date 2021\par T DAP  2021\par \par 3-7 minute discussion with patient about smoking cessation was initiated with patient showing interest in attempting to quit smoking. Problems with risks of continued tobacco smoking including respiratory, cardiovascular, and oncological problems were discussed. Advice on smoking cessation was reiterated including methods of quitting, setting a date to quit, and different medicines and support systems available for smoking cessation was discussed. Addressed  options including nicotine patches gums lozenges, Wellbutrin, Chantix as well as smoking cessation program.\par f/u  3 months\par \par Follow-up PFT\par \par September 2021 low-dose CAT scan screening follow-up protocol\par \par Cardiology f/u\par Pending ECHO\par Completed MODERNA + booster

## 2022-01-07 ENCOUNTER — APPOINTMENT (OUTPATIENT)
Dept: PULMONOLOGY | Facility: CLINIC | Age: 67
End: 2022-01-07

## 2022-01-08 LAB — SARS-COV-2 N GENE NPH QL NAA+PROBE: NOT DETECTED

## 2022-01-11 ENCOUNTER — APPOINTMENT (OUTPATIENT)
Dept: PULMONOLOGY | Facility: CLINIC | Age: 67
End: 2022-01-11
Payer: MEDICARE

## 2022-01-11 VITALS
WEIGHT: 134 LBS | HEIGHT: 68 IN | TEMPERATURE: 97.9 F | BODY MASS INDEX: 20.31 KG/M2 | DIASTOLIC BLOOD PRESSURE: 73 MMHG | RESPIRATION RATE: 15 BRPM | SYSTOLIC BLOOD PRESSURE: 144 MMHG | HEART RATE: 75 BPM | OXYGEN SATURATION: 95 %

## 2022-01-11 LAB — POCT - HEMOGLOBIN (HGB), QUANTITATIVE, TRANSCUTANEOUS: 15.1

## 2022-01-11 PROCEDURE — 94010 BREATHING CAPACITY TEST: CPT

## 2022-01-11 PROCEDURE — 95012 NITRIC OXIDE EXP GAS DETER: CPT | Mod: 59

## 2022-01-11 PROCEDURE — 88738 HGB QUANT TRANSCUTANEOUS: CPT

## 2022-01-11 PROCEDURE — 99214 OFFICE O/P EST MOD 30 MIN: CPT | Mod: 25

## 2022-01-11 PROCEDURE — 94729 DIFFUSING CAPACITY: CPT

## 2022-01-11 PROCEDURE — ZZZZZ: CPT

## 2022-01-11 PROCEDURE — 94726 PLETHYSMOGRAPHY LUNG VOLUMES: CPT

## 2022-01-11 NOTE — PROCEDURE
[FreeTextEntry1] : PFT 1/11/22\par Well preserved flow rates\par Mild OAD\par Lung Volumes  nl range\par  Resistance nl\par Sp Conductance decreased\par Low nl DLCO 74 % pred\par HGB 15.1\par NIOX 11 ppb\par \par CT low-dose screening\par October 4, 2021\par Lung RADS 2\par Stable few small pulmonary nodules findings\par Mild secretions in the trachea\par 1 year follow-up recommended\par \par PFT 7/27/21\par Well preserved flow rates\par  Mild OAD\par  Lung Volumes nl\par  Low nl DLCL 73 % \par  HGB 13.0\par  stable pulmonary physiology\par NIOX 11 ppb  nl range\par \par X-ray PA lateral September 27, 2021\par Cardiac size normal\par Grossly clear lung fields\par No appreciable dominant pulmonary nodules\par Maritza mediastinum unremarkable\par Soft tissue bony structures unremarkable\par No pneumothorax\par Increased retrosternal airspace with flattening of the diaphragms consistent with hyper aeration\par Impression COPD\par \par NIOC  10 ppb  4/27/21 NL\par PFT 4/27 21\par Mild OAD\par  Lung Volumes nl\par DLCO 68 % with minimal loss fx  alveolar  capillary units\par  HGB 13.0\par \par Chest CT scan low-dose protocol September 3, 2020 Baseline study\par Did compared to studies of January 15, 2019 January 15, 2017\par Left upper lobe pulmonary nodule 3 mm no change\par Mild tracheobronchial secretions\par Adenopathy negative\par Impression lung RADS 2\par Annual screening low-dose chest CT 1 year\par \par NIOX 8  ppb August 20, 2020\par PFT without bronchodilator August 20, 2020\par Well-preserved flow rates mild obstructive pattern\par Normal lung volumes with borderline air trapping\par Specific conductance and resistance normal\par Reduction diffusion 65% of predicted with a loss of functioning alveolocapillary units\par Hemoglobin 14.9\par \par Pulmonary 6-minute walk stress test August 20, 2020\par Baseline room air O2 saturation 98%\par No desaturation\par Impression normal study\par \par Chest x-ray PA lateral projection January 3, 2019\par Cardiac size normal\par Hyper aeration\par Peribronchial cuffing\par No dominant pulmonary nodules parenchymal infiltrates pleural effusions\par Mediastinum normal\par Increased retrosternal airspace consistent with air-trapping\par Impression COPD\par \par PCV 23 1/2020\par \par Echocardiogram study completed October 19, 2021\par Ejection fraction estimate 65%\par Minimal mitral regurgitation\par Thickened aortic valve\par Mild tricuspid regurgitation\par No pulmonary hypertension\par \par Shringrx up to date\par Check re Prevnair\par

## 2022-01-11 NOTE — HISTORY OF PRESENT ILLNESS
[Stable] : are stable [None] : ~He/She~ has no significant interval events [Difficulty Breathing During Exertion] : stable dyspnea on exertion [Feelings Of Weakness On Exertion] : stable exercise intolerance [Cough] : denies coughing [Wheezing] : denies wheezing [Regional Soft Tissue Swelling Both Lower Extremities] : denies lower extremity edema [Chest Pain Or Discomfort] : denies chest pain [Fever] : denies fever [Date: ___] : was performed [unfilled] [Wt Gain ___ Lbs] : no recent weight gain [Wt Loss ___ Lbs] : no recent weight loss [Oxygen] : the patient uses no supplemental oxygen [de-identified] : subcm pulmonary nodules/ apical scar [FreeTextEntry1] : side  effects with Chantix  Did  get Nicotine  patch which has  helped from 1 ppd to  1/2  ppd over past  3  weeks\par in past did use Wellbutrin with some mild success but now actively smoking\par No Active respiratory symptoms\par Continues to smoke\par Addressed all questions regarding immunization profile\par No fevers chills or sweats\par No COVID-19 known primary contact exposures\par No lower extremity edema\par Denies chest pain chest tightness wheezing sputum production or hemoptysis\par \par

## 2022-01-27 ENCOUNTER — RX RENEWAL (OUTPATIENT)
Age: 67
End: 2022-01-27

## 2022-03-22 ENCOUNTER — APPOINTMENT (OUTPATIENT)
Dept: PULMONOLOGY | Facility: CLINIC | Age: 67
End: 2022-03-22
Payer: MEDICARE

## 2022-03-22 VITALS — SYSTOLIC BLOOD PRESSURE: 128 MMHG | OXYGEN SATURATION: 98 % | DIASTOLIC BLOOD PRESSURE: 76 MMHG | HEART RATE: 83 BPM

## 2022-03-22 PROCEDURE — 99214 OFFICE O/P EST MOD 30 MIN: CPT

## 2022-03-22 NOTE — DISCUSSION/SUMMARY
[FreeTextEntry1] : COPD with bronchiectasis\par Active tobacco use\par Continue to advised on tobacco cessation\par he remain on Spiriva Asmanex as ordered- OFF\par PRN Pro Air follow-up 3 months\par Prevnair  13 April 2021\par  second dose pending ? PCV 23 0r Prevnair 20\par Flu vaccine up to date 2021\par T DAP  2021\par \par 3-7 minute discussion with patient about smoking cessation was initiated with patient showing interest in attempting to quit smoking. Problems with risks of continued tobacco smoking including respiratory, cardiovascular, and oncological problems were discussed. Advice on smoking cessation was reiterated including methods of quitting, setting a date to quit, and different medicines and support systems available for smoking cessation was discussed. Addressed  options including nicotine patches gums lozenges, Wellbutrin, Chantix as well as smoking cessation program.\par f/u  3 months\par noted discussed use of controller therapy\par Follow-up PFT\par Oct 2022 low-dose CAT scan screening follow-up protocol\par Cardiology f/u\par Completed MODERNA + booster

## 2022-03-22 NOTE — REVIEW OF SYSTEMS
Initial (On Arrival) [Negative] : Sleep Disorder [FreeTextEntry9] : HLD CA Ca++ [FreeTextEntry7] : IBS

## 2022-03-22 NOTE — PROCEDURE
[FreeTextEntry1] : PFT 1/11/22\par Well preserved flow rates\par Mild OAD\par Lung Volumes  nl range\par  Resistance nl\par Sp Conductance decreased\par Low nl DLCO 74 % pred\par HGB 15.1\par NIOX 11 ppb\par \par CT low-dose screening f/u Oct 2022\par October 4, 2021\par Lung RADS 2\par Stable few small pulmonary nodules findings\par Mild secretions in the trachea\par 1 year follow-up recommended\par \par PFT 7/27/21\par Well preserved flow rates\par  Mild OAD\par  Lung Volumes nl\par  Low nl DLCL 73 % \par  HGB 13.0\par  stable pulmonary physiology\par NIOX 11 ppb  nl range\par \par X-ray PA lateral September 27, 2021\par Cardiac size normal\par Grossly clear lung fields\par No appreciable dominant pulmonary nodules\par Maritza mediastinum unremarkable\par Soft tissue bony structures unremarkable\par No pneumothorax\par Increased retrosternal airspace with flattening of the diaphragms consistent with hyper aeration\par Impression COPD\par \par NIOC  10 ppb  4/27/21 NL\par PFT 4/27 21\par Mild OAD\par  Lung Volumes nl\par DLCO 68 % with minimal loss fx  alveolar  capillary units\par  HGB 13.0\par \par Chest CT scan low-dose protocol September 3, 2020 Baseline study\par Did compared to studies of January 15, 2019 January 15, 2017\par Left upper lobe pulmonary nodule 3 mm no change\par Mild tracheobronchial secretions\par Adenopathy negative\par Impression lung RADS 2\par Annual screening low-dose chest CT 1 year\par \par NIOX 8  ppb August 20, 2020\par PFT without bronchodilator August 20, 2020\par Well-preserved flow rates mild obstructive pattern\par Normal lung volumes with borderline air trapping\par Specific conductance and resistance normal\par Reduction diffusion 65% of predicted with a loss of functioning alveolocapillary units\par Hemoglobin 14.9\par \par Pulmonary 6-minute walk stress test August 20, 2020\par Baseline room air O2 saturation 98%\par No desaturation\par Impression normal study\par \par Chest x-ray PA lateral projection January 3, 2019\par Cardiac size normal\par Hyper aeration\par Peribronchial cuffing\par No dominant pulmonary nodules parenchymal infiltrates pleural effusions\par Mediastinum normal\par Increased retrosternal airspace consistent with air-trapping\par Impression COPD\par \par PCV 23 1/2020\par \par Echocardiogram study completed October 19, 2021\par Ejection fraction estimate 65%\par Minimal mitral regurgitation\par Thickened aortic valve\par Mild tricuspid regurgitation\par No pulmonary hypertension\par \par Shringrx up to date\par Check re Prevnair\par

## 2022-03-22 NOTE — HISTORY OF PRESENT ILLNESS
[Stable] : are stable [None] : ~He/She~ has no significant interval events [Difficulty Breathing During Exertion] : stable dyspnea on exertion [Feelings Of Weakness On Exertion] : stable exercise intolerance [Cough] : denies coughing [Wheezing] : denies wheezing [Regional Soft Tissue Swelling Both Lower Extremities] : denies lower extremity edema [Fever] : denies fever [Chest Pain Or Discomfort] : denies chest pain [Date: ___] : was performed [unfilled] [Wt Gain ___ Lbs] : no recent weight gain [Wt Loss ___ Lbs] : no recent weight loss [Oxygen] : the patient uses no supplemental oxygen [de-identified] : subcm pulmonary nodules/ apical scar [FreeTextEntry1] : side  effects with Chantix  Did  get Nicotine  patch which has  helped from 1 ppd to  1/2  ppd over past  3  weeks\par in past did use Wellbutrin with some mild success but now actively smoking\par No Active respiratory symptoms but notes some inc dyspnea primarily stairs\par Continues to smoke pos 6-7 cigs per day\par Addressed all questions regarding immunization profile\par No fevers chills or sweats\par No COVID-19 known primary contact exposures\par No lower extremity edema\par Denies chest pain chest tightness wheezing sputum production or hemoptysis\par \par

## 2022-04-06 ENCOUNTER — NON-APPOINTMENT (OUTPATIENT)
Age: 67
End: 2022-04-06

## 2022-04-12 ENCOUNTER — APPOINTMENT (OUTPATIENT)
Dept: CARDIOLOGY | Facility: CLINIC | Age: 67
End: 2022-04-12

## 2022-04-22 ENCOUNTER — APPOINTMENT (OUTPATIENT)
Dept: CARDIOLOGY | Facility: CLINIC | Age: 67
End: 2022-04-22

## 2022-05-10 ENCOUNTER — LABORATORY RESULT (OUTPATIENT)
Age: 67
End: 2022-05-10

## 2022-05-10 ENCOUNTER — APPOINTMENT (OUTPATIENT)
Dept: CARDIOLOGY | Facility: CLINIC | Age: 67
End: 2022-05-10
Payer: MEDICARE

## 2022-05-10 ENCOUNTER — NON-APPOINTMENT (OUTPATIENT)
Age: 67
End: 2022-05-10

## 2022-05-10 VITALS
HEART RATE: 66 BPM | DIASTOLIC BLOOD PRESSURE: 70 MMHG | RESPIRATION RATE: 15 BRPM | BODY MASS INDEX: 20.16 KG/M2 | HEIGHT: 68 IN | OXYGEN SATURATION: 98 % | SYSTOLIC BLOOD PRESSURE: 105 MMHG | TEMPERATURE: 98 F | WEIGHT: 133 LBS

## 2022-05-10 PROCEDURE — 93000 ELECTROCARDIOGRAM COMPLETE: CPT

## 2022-05-10 PROCEDURE — 99214 OFFICE O/P EST MOD 30 MIN: CPT | Mod: 25

## 2022-05-10 NOTE — PHYSICAL EXAM
[Well Developed] : well developed [Well Nourished] : well nourished [No Acute Distress] : no acute distress [Normal Venous Pressure] : normal venous pressure [No Carotid Bruit] : no carotid bruit [Normal S1, S2] : normal S1, S2 [No Murmur] : no murmur [No Rub] : no rub [Clear Lung Fields] : clear lung fields [Good Air Entry] : good air entry [No Respiratory Distress] : no respiratory distress  [Soft] : abdomen soft [Non Tender] : non-tender [No Masses/organomegaly] : no masses/organomegaly [Normal Bowel Sounds] : normal bowel sounds [Normal Gait] : normal gait [No Edema] : no edema [No Cyanosis] : no cyanosis [No Clubbing] : no clubbing [No Varicosities] : no varicosities [No Rash] : no rash [No Skin Lesions] : no skin lesions [Moves all extremities] : moves all extremities [No Focal Deficits] : no focal deficits [Normal Speech] : normal speech [Alert and Oriented] : alert and oriented [Normal memory] : normal memory [General Appearance - Well Developed] : well developed [Normal Appearance] : normal appearance [Well Groomed] : well groomed [General Appearance - Well Nourished] : well nourished [No Deformities] : no deformities [General Appearance - In No Acute Distress] : no acute distress [Normal Conjunctiva] : the conjunctiva exhibited no abnormalities [Normal Oral Mucosa] : normal oral mucosa [Normal Jugular Venous A Waves Present] : normal jugular venous A waves present [Normal Jugular Venous V Waves Present] : normal jugular venous V waves present [No Jugular Venous Kramer A Waves] : no jugular venous kramer A waves [Respiration, Rhythm And Depth] : normal respiratory rhythm and effort [Exaggerated Use Of Accessory Muscles For Inspiration] : no accessory muscle use [Auscultation Breath Sounds / Voice Sounds] : lungs were clear to auscultation bilaterally [Bowel Sounds] : normal bowel sounds [Abdomen Soft] : soft [Abnormal Walk] : normal gait [Gait - Sufficient For Exercise Testing] : the gait was sufficient for exercise testing [Nail Clubbing] : no clubbing of the fingernails [Cyanosis, Localized] : no localized cyanosis [Skin Color & Pigmentation] : normal skin color and pigmentation [Skin Turgor] : normal skin turgor [] : no rash [Oriented To Time, Place, And Person] : oriented to person, place, and time [Affect] : the affect was normal [Mood] : the mood was normal [No Anxiety] : not feeling anxious [5th Left ICS - MCL] : palpated at the 5th LICS in the midclavicular line [Normal] : normal [No Precordial Heave] : no precordial heave was noted [Normal Rate] : normal [Rhythm Regular] : regular [Normal S1] : normal S1 [Normal S2] : normal S2 [No Gallop] : no gallop heard [II] : a grade 2 [2+] : left 2+ [No Pitting Edema] : no pitting edema present [FreeTextEntry1] : left bruit vs. transmitted murmur [Click] : no click [Pericardial Rub] : no pericardial rub

## 2022-05-10 NOTE — CARDIOLOGY SUMMARY
[___] : [unfilled] [de-identified] : 5/10/22 [de-identified] : NUC 4/30/2018 [de-identified] : 3/5/2019 [de-identified] : CAROTID DOPPLER: 3/5/2019\par NURYS: 3/5/2019

## 2022-05-10 NOTE — ASSESSMENT
[FreeTextEntry1] : This is a 66 year year old male here today for follow up cardiac evaluation. \par He has a past medical history significant for hyperlipidemia, status post cholecystectomy, coronary artery calcification.\par \par Today he is feeling generally well and does not have any complaints at this time. He wants to make a note that he is following up with his urologist in regards to his BPH, Dr. Johnston. He is currently on Zetia 10mg  PO Daily and Rosuvastatin 40mg PO DAILY which his PCP increased from 20mg for LDL goal of 70 or less. He may have occasional NGUYEN when going up some stairs. 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 \par \par -He states that he has been helping his wife who was recently dx with breast Ca and is going through treatment at this time. \par \par -Pt is stable from a cardiac standpoint and does not have any complaints at this time. \par \par -Cardiac risk factors include HLD and hx of smoker.\par \par BLOOD PRESSURE:\par -BP is well controlled in today's visit.\par \par BLOOD WORK:\par -New blood work was done today, 05/10/2022  to evaluate lipid profile, CBC, BMP, hepatic function, A1C and TSH.  \par -Blood work from 10/27/22 demonstrated Cholesterol of 134 and LDL of 78.\par -Blood work was done 05/25/2021 demonstrated normal lipid profile. \par -Pt currently taking Rosuvastatin 40mg PO DAILY and on Zetia 10mg  PO Daily.\par \par CHOLESTEROL CONTROL:\par -Patient will continue the advised  TLC diet and to continue follow-up for treatment of hyperlipidemia and repeat blood testing with diet and exercise. I have discussed different exercises and the importance of maintenance of optimal body weight. The importance of staying within guidelines and recommendations was stressed to the patient today and they acknowledged that they understand this to me verbally.\par \par  -Mr. COWAN was educated and advised that failure to follow-up with my medical recommendations to lower cholesterol can result in severe health consequences therefore, they will continuing a low saturated and low fat diet and to avoid excessive carbohydrates to help reduce triglycerides and that lowering LDL levels is associated with a significant decrease in serious cardiac events including but not limited to heart attack stroke and overall death. We will continue lipid lowering agents as advised based on blood test results and the patient understands to call if they develop severe muscle discomfort or if they have a reddish tinted discoloration in their urine.\par \par TESTING/REPORTS:\par -EKG done today 05/10/2022 which demonstrated regular sinus rhythm with nonspecific ST-T wave changes BPM of 66. \par \par -Electrocardiogram done 5/25/2021 which demonstrated normal sinus rhythm rate 84 beats per minute otherwise remarkable for left axis deviation and left atrial abnormality.\par \par -The patient at chest CAT scan in 2017 which demonstrated coronary artery calcification. A new CAT scan done January 15, 2019 confirm the presence of coronary atherosclerosis. There was no detailed definition regarding extent the location of the coronary calcification.\par \par -The patient had a nuclear stress test done by his prior cardiologist April 30, 2018 which demonstrated normal coronary perfusion with an estimated ejection fraction 61% and normal wall motion.\par \par -The patient had a coronary calcium score done February 26, 2019 which demonstrated a total coronary calcium level of 129 units, all in the left anterior descending artery.\par \par -Echocardiogram done 3/5/2019 demonstrated mild mitral tricuspid and pulmonic regurgitation with normal left ventricular systolic function. EF 63%.\par \par -Echocardiogram done on 10/19/2021 demonstrated thickened mitral valve, mild MR, thickened aortic valve, mild TR with normal left ventricular systolic function. EF of 65%.\par \par PLAN:\par -The patient will schedule an Exercise Stress Test rule out significant coronary artery disease.\par -He will continue with his usual medications and will contact the office if he is having any complaints between now and their next follow up appointment.\par -He will follow up with his urologist.\par \par I have discussed the plan of care with Mr. KAL COWAN  and he  will follow up in 3 months. He is compliant with all of his medications.\par \par The patient understands that aerobic exercises must be increased to minutes 4 times/week and a detailed discussion of lifestyle modification was done today. \par The patient has a good understanding of the diagnosis, treatment plan and lifestyle modification. \par He will contact me at the office for any questions with their care or any changes in their health status.\par \par Mark CABRAL

## 2022-05-24 RX ORDER — CHOLECALCIFEROL (VITAMIN D3) 50 MCG
50 MCG TABLET ORAL DAILY
Refills: 0 | Status: ACTIVE | COMMUNITY
Start: 2019-02-26

## 2022-05-27 ENCOUNTER — APPOINTMENT (OUTPATIENT)
Dept: PULMONOLOGY | Facility: CLINIC | Age: 67
End: 2022-05-27
Payer: MEDICARE

## 2022-05-27 VITALS — SYSTOLIC BLOOD PRESSURE: 107 MMHG | DIASTOLIC BLOOD PRESSURE: 61 MMHG | OXYGEN SATURATION: 98 % | HEART RATE: 90 BPM

## 2022-05-27 LAB — POCT - HEMOGLOBIN (HGB), QUANTITATIVE, TRANSCUTANEOUS: 13.8

## 2022-05-27 PROCEDURE — 94726 PLETHYSMOGRAPHY LUNG VOLUMES: CPT

## 2022-05-27 PROCEDURE — 94060 EVALUATION OF WHEEZING: CPT

## 2022-05-27 PROCEDURE — 95012 NITRIC OXIDE EXP GAS DETER: CPT

## 2022-05-27 PROCEDURE — 94729 DIFFUSING CAPACITY: CPT

## 2022-05-27 PROCEDURE — 88738 HGB QUANT TRANSCUTANEOUS: CPT

## 2022-05-27 PROCEDURE — 94618 PULMONARY STRESS TESTING: CPT

## 2022-06-07 ENCOUNTER — APPOINTMENT (OUTPATIENT)
Dept: PULMONOLOGY | Facility: CLINIC | Age: 67
End: 2022-06-07
Payer: MEDICARE

## 2022-06-07 VITALS
BODY MASS INDEX: 20.16 KG/M2 | DIASTOLIC BLOOD PRESSURE: 64 MMHG | TEMPERATURE: 98.2 F | HEART RATE: 77 BPM | SYSTOLIC BLOOD PRESSURE: 111 MMHG | WEIGHT: 133 LBS | OXYGEN SATURATION: 97 % | HEIGHT: 68 IN

## 2022-06-07 PROCEDURE — 99406 BEHAV CHNG SMOKING 3-10 MIN: CPT

## 2022-06-07 PROCEDURE — 99214 OFFICE O/P EST MOD 30 MIN: CPT | Mod: 25

## 2022-06-07 NOTE — HISTORY OF PRESENT ILLNESS
Chief Complaint   Patient presents with   • Anxiety       SUBJECTIVE:    Hanh is a 14 year old female who comes to the clinic today to discuss concerns regarding anxiety. She is accompanied by her mom.     She reports long hx of anxiety, worse over last few weeks.  The anxiety has significantly affected her schooling.  She has refused to go into the building due to anxiety.  Sometimes going to the grocery store makes her feel anxious.  On further questioning she reports that is being in closed spaces that tends to make her feel anxious.  She went camping last weekend and felt fine despite other people being around.  She feels very claustrophobic and elevators.    Patient describes her feelings of anxiety as feeling lightheaded and feeling her heart racing.  During our visit she reports feeling this way.    She also has symptoms of depression.  See PHQ-9 and GAD7 for more details.  She denies suicidal ideation.    She lives at home with her mother and brother.  Her parents are  since 2016. At that time she did go to therapy, mostly related to family changes, did not find it very helpful.  She does see her father on weekends.  He is living in a motel that sometime she spent the night at his girlfriend's parents house.    With regards to school, she will be starting high school in the fall.  She does not have a specialized Education Plan, though she struggles in school.  Mom has thought about having her tested for developmental delay but has not done this yet.  Since going to online learning she has missed online learning due to over sleeping.  She got D's and C's in school last semester.      Current Outpatient Medications   Medication Sig Dispense Refill   • Multiple Vitamins-Minerals (HAIR SKIN AND NAILS FORMULA) Tab      • albuterol 108 (90 Base) MCG/ACT inhaler Inhale 2 puffs into the lungs every 4 hours as needed for Shortness of Breath or Wheezing. 1 Inhaler 0   • escitalopram (LEXAPRO) 10 MG tablet  Take 1 tablet by mouth daily. 30 tablet 1   • hydrOXYzine (ATARAX) 10 MG tablet Take 1 tablet by mouth 3 times daily as needed for Itching or Anxiety. 90 tablet 1     No current facility-administered medications for this visit.        Past Medical History:   Diagnosis Date   • Urinary tract infection        Social History     Socioeconomic History   • Marital status: Single     Spouse name: Not on file   • Number of children: Not on file   • Years of education: Not on file   • Highest education level: Not on file   Occupational History   • Not on file   Social Needs   • Financial resource strain: Not on file   • Food insecurity:     Worry: Not on file     Inability: Not on file   • Transportation needs:     Medical: Not on file     Non-medical: Not on file   Tobacco Use   • Smoking status: Never Smoker   • Smokeless tobacco: Never Used   Substance and Sexual Activity   • Alcohol use: No     Alcohol/week: 0.0 standard drinks   • Drug use: No   • Sexual activity: Not on file   Lifestyle   • Physical activity:     Days per week: Not on file     Minutes per session: Not on file   • Stress: Not on file   Relationships   • Social connections:     Talks on phone: Not on file     Gets together: Not on file     Attends Alevism service: Not on file     Active member of club or organization: Not on file     Attends meetings of clubs or organizations: Not on file     Relationship status: Not on file   • Intimate partner violence:     Fear of current or ex partner: Not on file     Emotionally abused: Not on file     Physically abused: Not on file     Forced sexual activity: Not on file   Other Topics Concern   • Not on file   Social History Narrative   • Not on file        History reviewed. No pertinent surgical history.     History reviewed. No pertinent family history.     REVIEW OF SYSTEMS:  CONSTITUTIONAL:  Denies fever or chills.  HEENT:  Denies change in visual acuity.  Denies nasal congestion or sore  [Stable] : are stable [None] : ~He/She~ has no significant interval events throat.  RESPIRATORY:  Denies cough or shortness of breath.  CARDIOVASCULAR:  Denies chest pain or edema.   INTEGUMENT:  Mom reports patient has highs after returning from visits with her father  NEUROLOGIC:  Denies headache, focal weakness or sensory changes.  PSYCHIATRIC:  Reports depression and anxiety.     OBJECTIVE:  VITAL SIGNS:    Visit Vitals  /76 (BP Location: RUE - Right upper extremity, Patient Position: Sitting, Cuff Size: Large Adult)   Pulse 96   Temp 98.8 °F (37.1 °C)   Wt (!) 106.6 kg   SpO2 96%     GENERAL:  Well developed, well nourished, no acute distress, non-toxic appearance.   HEENT:   Oropharynx moist, no pharyngeal exudates.  Neck- Normal range of motion, no tenderness, supple.  Eyes:    Conjunctivae normal.  Extraocular motions intact.   RESPIRATORY:  No respiratory distress, normal breath sounds, no rales, no wheezing.   CARDIOVASCULAR:  Normal rate, normal rhythm, no murmurs, no gallops, no rubs.   SKIN:  Well hydrated, no rash.   LYMPHATICS:  No lymphadenopathy noted.   NEUROLOGICAL:  Alert and oriented x3.  Cranial nerves 2-12 normal.  Normal motor function.  Normal sensory function.  No focal deficits noted.   PSYCHIATRIC:  Speech and behavior appropriate.   Oriented x3 to person, place and time,   Mood elevated  Affect anxious  Perseveration: generally WNL  Speech: speaks in short sentences  Language: generally WNL  Abstract Reasoning: appears impaired   sychomotor: agitated, playing with water bottle, picking at legs, constantly adjusting mask  Thought Processes - Hallucinations none   Delusions did not appear delusional in thought      ASSESSMENT/PLAN:  1. Mixed anxiety and depressive disorder    2. Claustrophobia      We discussed treatment options today.  She did not note improvement with 40 mg of fluoxetine in the past.  Lexapro ordered at 10 mg.  Also ordered hydroxyzine for anxiety to use throughout the day.  May also help with rash that she developed after being at  [Difficulty Breathing During Exertion] : stable dyspnea on exertion father's house.  We discussed importance of behavioral therapy for her phobia.  Patient initially hesitant but is agreeable to plan.  I asked that her mom contact the school about developing an IEP and further testing as needed. Can consider referral for neuro/developmental psych testing outside of school if needed.    Recommend follow-up in 4 weeks.  Discussed common side effects of Lexapro and rare side effects including suicidal ideation.      Orders Placed This Encounter   • SERVICE TO BEHAVIORAL HEALTH   • Multiple Vitamins-Minerals (HAIR SKIN AND NAILS FORMULA) Tab   • escitalopram (LEXAPRO) 10 MG tablet   • hydrOXYzine (ATARAX) 10 MG tablet       Return in about 4 weeks (around 8/26/2020) for anxiety .     In excess of 25 minutes was spent face to face with the patient during this visit. More than 50% of the visit was spent providing education and/or counseling to the patient regarding the issues documented in this note.     [Feelings Of Weakness On Exertion] : stable exercise intolerance [Cough] : denies coughing [Wheezing] : denies wheezing [Regional Soft Tissue Swelling Both Lower Extremities] : denies lower extremity edema [Chest Pain Or Discomfort] : denies chest pain [Fever] : denies fever [Date: ___] : was performed [unfilled] [Wt Gain ___ Lbs] : no recent weight gain [Wt Loss ___ Lbs] : no recent weight loss [Oxygen] : the patient uses no supplemental oxygen [de-identified] : subcm pulmonary nodules/ apical scar [FreeTextEntry1] : ENT laryngeal reflux with pending EGD\par \par side  effects with Chantix  Did  get Nicotine  patch which has  helped from 1 ppd to  1/2  ppd over past  3  weeks\par in past did use Wellbutrin with some mild success but now actively smoking\par No Active respiratory symptoms but notes some inc dyspnea primarily stairs\par Continues to smoke pos 6-7 cigs per day\par Addressed all questions regarding immunization profile\par No fevers chills or sweats\par No lower extremity edema\par Denies chest pain chest tightness wheezing sputum production or hemoptysis\par \par

## 2022-06-07 NOTE — PROCEDURE
[FreeTextEntry1] : PFT May 27, 2022\par Well-preserved flow rates\par Mild obstructive ventilatory impairment\par Positive bronchodilator response at small airways\par Lung volumes suggest air trapping with increased % predicted\par Specific conductance and resistance normal range\par Diffusion normal range 75% predicted\par Data comparison January 11, 2022 demonstrates interval improvement at the total lung capacity otherwise overall stable pulmonary physiology\par \par Pulmonary 6-minute walk exercise study May 27, 2022\par Baseline room air O2 saturation 98%\par Impression normal study\par No room air desaturation\par NIOX 7 normal range \par \par Laboratory data ordered by primary care physician 5/11/22\par Reviewed\par Normal renal function normal electrolytes\par Liver function testing normal\par Uric acid 3.7\par Magnesium 2.2 cholesterol 121 hemoglobin A1c 5.8\par TSH 1.66 \par \par PFT 1/11/22\par Well preserved flow rates\par Mild OAD\par Lung Volumes  nl range\par  Resistance nl\par Sp Conductance decreased\par Low nl DLCO 74 % pred\par HGB 15.1\par NIOX 11 ppb\par \par CT low-dose screening f/u Oct 2022\par October 4, 2021\par Lung RADS 2\par Stable few small pulmonary nodules findings\par Mild secretions in the trachea\par 1 year follow-up recommended\par \par PFT 7/27/21\par Well preserved flow rates\par  Mild OAD\par  Lung Volumes nl\par  Low nl DLCL 73 % \par  HGB 13.0\par  stable pulmonary physiology\par NIOX 11 ppb  nl range\par \par X-ray PA lateral September 27, 2021\par Cardiac size normal\par Grossly clear lung fields\par No appreciable dominant pulmonary nodules\par Maritza mediastinum unremarkable\par Soft tissue bony structures unremarkable\par No pneumothorax\par Increased retrosternal airspace with flattening of the diaphragms consistent with hyper aeration\par Impression COPD\par \par NIOC  10 ppb  4/27/21 NL\par PFT 4/27 21\par Mild OAD\par  Lung Volumes nl\par DLCO 68 % with minimal loss fx  alveolar  capillary units\par  HGB 13.0\par \par Chest CT scan low-dose protocol September 3, 2020 Baseline study\par Did compared to studies of January 15, 2019 January 15, 2017\par Left upper lobe pulmonary nodule 3 mm no change\par Mild tracheobronchial secretions\par Adenopathy negative\par Impression lung RADS 2\par Annual screening low-dose chest CT 1 year\par \par NIOX 8  ppb August 20, 2020\par PFT without bronchodilator August 20, 2020\par Well-preserved flow rates mild obstructive pattern\par Normal lung volumes with borderline air trapping\par Specific conductance and resistance normal\par Reduction diffusion 65% of predicted with a loss of functioning alveolocapillary units\par Hemoglobin 14.9\par \par Pulmonary 6-minute walk stress test August 20, 2020\par Baseline room air O2 saturation 98%\par No desaturation\par Impression normal study\par \par Chest x-ray PA lateral projection January 3, 2019\par Cardiac size normal\par Hyper aeration\par Peribronchial cuffing\par No dominant pulmonary nodules parenchymal infiltrates pleural effusions\par Mediastinum normal\par Increased retrosternal airspace consistent with air-trapping\par Impression COPD\par \par PCV 23 1/2020\par \par Echocardiogram study completed October 19, 2021\par Ejection fraction estimate 65%\par Minimal mitral regurgitation\par Thickened aortic valve\par Mild tricuspid regurgitation\par No pulmonary hypertension\par \par Shringrx up to date\par Check re Prevnair\par

## 2022-06-07 NOTE — DISCUSSION/SUMMARY
[FreeTextEntry1] : COPD with bronchiectasis\par Active tobacco use\par per ENT LPR- pending EGD\par Continue to advised on tobacco cessation\par he remain on Spiriva Asmanex as ordered- OFF\par PRN Pro Air follow-up 3 months\par Prevnair  13 April 2021\par Pneumonia 13/23 Patient states Up To Date\par Flu vaccine up to date 2021\par Shingles x 2 completed\par T DAP  2021\par \par 3-7 minute discussion with patient about smoking cessation was initiated with patient showing interest in attempting to quit smoking. Problems with risks of continued tobacco smoking including respiratory, cardiovascular, and oncological problems were discussed. Advice on smoking cessation was reiterated including methods of quitting, setting a date to quit, and different medicines and support systems available for smoking cessation was discussed. Addressed  options including nicotine patches gums lozenges, Wellbutrin, Chantix as well as smoking cessation program.\par f/u  3 months\par noted discussed use of controller therapy\par Follow-up PFT\par Oct 2022 low-dose CAT scan screening follow-up protocol\par Cardiology f/u\par Completed MODERNA + booster x 2

## 2022-08-16 ENCOUNTER — APPOINTMENT (OUTPATIENT)
Dept: CARDIOLOGY | Facility: CLINIC | Age: 67
End: 2022-08-16

## 2022-08-16 VITALS
TEMPERATURE: 98.1 F | SYSTOLIC BLOOD PRESSURE: 122 MMHG | DIASTOLIC BLOOD PRESSURE: 72 MMHG | WEIGHT: 130 LBS | HEART RATE: 79 BPM | OXYGEN SATURATION: 97 % | BODY MASS INDEX: 19.7 KG/M2 | HEIGHT: 68 IN | RESPIRATION RATE: 15 BRPM

## 2022-08-16 DIAGNOSIS — I73.9 PERIPHERAL VASCULAR DISEASE, UNSPECIFIED: ICD-10-CM

## 2022-08-16 PROCEDURE — 93015 CV STRESS TEST SUPVJ I&R: CPT

## 2022-08-16 PROCEDURE — 93922 UPR/L XTREMITY ART 2 LEVELS: CPT

## 2022-08-16 PROCEDURE — 99213 OFFICE O/P EST LOW 20 MIN: CPT | Mod: 25

## 2022-08-16 NOTE — CARDIOLOGY SUMMARY
[___] : [unfilled] [de-identified] : 5/10/22 [de-identified] : NUC 4/30/2018 [de-identified] : 3/5/2019 [de-identified] : CAROTID DOPPLER: 3/5/2019\par NUYRS: 3/5/2019

## 2022-08-16 NOTE — DISCUSSION/SUMMARY
[FreeTextEntry1] : This is a 67-year-old male with past medical history significant for hyperlipidemia, status post cholecystectomy, coronary artery calcification, who comes in for cardiac follow up evaluation. \par The patient denies chest pain, shortness of breath, dizziness or syncope.\par The patient had normal exercise stress test August 16, 2022\par Blood work done May 10, 2022 demonstrated cholesterol 121, HDL 35, triglycerides 110, LDL calculated 64 mg/dL, LDL direct 66 mg/dL.\par He had a normal nuclear stress test in 2018. \par He does complain of occasional cramping in his legs.\par He will have an ankle-brachial index done today to rule out significant peripheral arterial disease given the presence of coronary artery calcification and history of smoking.\par He will continue on his current dose of Crestor 40 mg daily, Zetia 10 mg/day.  He will have repeat blood work done in 3 to 4 months for lipid panel and SMA-20.\par \par Blood work done June 9, 2020 demonstrated cholesterol 142, triglycerides 121, HDL of 37, and direct LDL of 88 mg/dL.\par The patient will have new blood work today to reevaluate his lipid panel.  He is currently hemodynamically stable and asymptomatic from a cardiac standpoint.\par \par Electrocardiogram done June 9, 2020 which demonstrated normal sinus rhythm rate 80 beats per minute otherwise remarkable for left axis deviation and left atrial abnormality..\par \par  The patient will continue on his current dose of Zetia 10 mg per day in addition to his Crestor 20 mg per day.  \par He will follow up with you regarding his overall medical care prior to followup with me in 3 months.\par \par  Cardiac risk factors include hyperlipidemia, and smoking a half a pack to one pack per day over the last 35 years.\par \par The patient had a coronary calcium score done February 26, 2019 which demonstrated a total coronary calcium level of 129 units, all in the left anterior descending artery.\par \par The patient had a nuclear stress test done by his prior cardiologist April 30, 2018 which demonstrated normal coronary perfusion with an estimated ejection fraction 61% and normal wall motion.\par \par The patient is instructed to followup with his pulmonologist regarding his smoking cessation.  Smoking cessation was reinforced. \par \par The patient at chest CAT scan in 2017 which demonstrated coronary artery calcification. A new CAT scan done January 15, 2019 confirm the presence of coronary atherosclerosis. There was no detailed definition regarding extent the location of the coronary calcification.\par \par The patient understands that aerobic exercises must be increased to 40 minutes 4 times per week. A detailed discussion of lifestyle modification was done today. The patient has a good understanding of the diagnosis, and treatment plan. Lifestyle modification was also outlined.

## 2022-08-16 NOTE — PHYSICAL EXAM
[Well Developed] : well developed [Well Nourished] : well nourished [No Acute Distress] : no acute distress [Normal Venous Pressure] : normal venous pressure [No Carotid Bruit] : no carotid bruit [Normal S1, S2] : normal S1, S2 [No Rub] : no rub [I] : a grade 1 [No Abnormalities] : the abdominal aorta was not enlarged and no bruit was heard [Clear Lung Fields] : clear lung fields [Good Air Entry] : good air entry [No Respiratory Distress] : no respiratory distress  [Soft] : abdomen soft [Non Tender] : non-tender [No Masses/organomegaly] : no masses/organomegaly [Normal Bowel Sounds] : normal bowel sounds [Normal Gait] : normal gait [No Edema] : no edema [No Cyanosis] : no cyanosis [No Clubbing] : no clubbing [No Varicosities] : no varicosities [No Rash] : no rash [No Skin Lesions] : no skin lesions [Moves all extremities] : moves all extremities [No Focal Deficits] : no focal deficits [Normal Speech] : normal speech [Alert and Oriented] : alert and oriented [Normal memory] : normal memory [General Appearance - Well Developed] : well developed [Normal Appearance] : normal appearance [Well Groomed] : well groomed [General Appearance - Well Nourished] : well nourished [No Deformities] : no deformities [General Appearance - In No Acute Distress] : no acute distress [Normal Conjunctiva] : the conjunctiva exhibited no abnormalities [Normal Oral Mucosa] : normal oral mucosa [Normal Jugular Venous A Waves Present] : normal jugular venous A waves present [Normal Jugular Venous V Waves Present] : normal jugular venous V waves present [No Jugular Venous Kramer A Waves] : no jugular venous kramer A waves [Respiration, Rhythm And Depth] : normal respiratory rhythm and effort [Exaggerated Use Of Accessory Muscles For Inspiration] : no accessory muscle use [Auscultation Breath Sounds / Voice Sounds] : lungs were clear to auscultation bilaterally [Bowel Sounds] : normal bowel sounds [Abdomen Soft] : soft [Abnormal Walk] : normal gait [Gait - Sufficient For Exercise Testing] : the gait was sufficient for exercise testing [Nail Clubbing] : no clubbing of the fingernails [Cyanosis, Localized] : no localized cyanosis [Skin Color & Pigmentation] : normal skin color and pigmentation [Skin Turgor] : normal skin turgor [] : no rash [Oriented To Time, Place, And Person] : oriented to person, place, and time [Affect] : the affect was normal [Mood] : the mood was normal [No Anxiety] : not feeling anxious [5th Left ICS - MCL] : palpated at the 5th LICS in the midclavicular line [Normal] : normal [No Precordial Heave] : no precordial heave was noted [Normal Rate] : normal [Rhythm Regular] : regular [Normal S1] : normal S1 [Normal S2] : normal S2 [No Gallop] : no gallop heard [II] : a grade 2 [2+] : left 2+ [No Pitting Edema] : no pitting edema present [Apical Thrill] : no thrill palpable at the apex [S3] : no S3 [S4] : no S4 [Distant] : the heart sounds were ~L not distant [Right Carotid Bruit] : no bruit heard over the right carotid [Left Carotid Bruit] : no bruit heard over the left carotid [Right Femoral Bruit] : no bruit heard over the right femoral artery [Left Femoral Bruit] : no bruit heard over the left femoral artery [FreeTextEntry1] : left bruit vs. transmitted murmur [Click] : no click [Pericardial Rub] : no pericardial rub

## 2022-08-29 PROBLEM — I73.9 CLAUDICATION: Status: ACTIVE | Noted: 2020-11-09

## 2022-09-13 ENCOUNTER — APPOINTMENT (OUTPATIENT)
Dept: PULMONOLOGY | Facility: CLINIC | Age: 67
End: 2022-09-13

## 2022-09-13 VITALS
DIASTOLIC BLOOD PRESSURE: 58 MMHG | OXYGEN SATURATION: 98 % | TEMPERATURE: 97.5 F | SYSTOLIC BLOOD PRESSURE: 106 MMHG | WEIGHT: 133 LBS | HEIGHT: 68 IN | RESPIRATION RATE: 15 BRPM | BODY MASS INDEX: 20.16 KG/M2 | HEART RATE: 86 BPM

## 2022-09-13 DIAGNOSIS — Z23 ENCOUNTER FOR IMMUNIZATION: ICD-10-CM

## 2022-09-13 LAB — POCT - HEMOGLOBIN (HGB), QUANTITATIVE, TRANSCUTANEOUS: 15.2

## 2022-09-13 PROCEDURE — ZZZZZ: CPT

## 2022-09-13 PROCEDURE — G0296 VISIT TO DETERM LDCT ELIG: CPT

## 2022-09-13 PROCEDURE — 99406 BEHAV CHNG SMOKING 3-10 MIN: CPT

## 2022-09-13 PROCEDURE — 94729 DIFFUSING CAPACITY: CPT

## 2022-09-13 PROCEDURE — 71046 X-RAY EXAM CHEST 2 VIEWS: CPT

## 2022-09-13 PROCEDURE — 90662 IIV NO PRSV INCREASED AG IM: CPT

## 2022-09-13 PROCEDURE — 94727 GAS DIL/WSHOT DETER LNG VOL: CPT

## 2022-09-13 PROCEDURE — G0008: CPT

## 2022-09-13 PROCEDURE — 99214 OFFICE O/P EST MOD 30 MIN: CPT | Mod: 25

## 2022-09-13 PROCEDURE — 94010 BREATHING CAPACITY TEST: CPT

## 2022-09-13 PROCEDURE — 88738 HGB QUANT TRANSCUTANEOUS: CPT

## 2022-09-13 NOTE — PROCEDURE
[FreeTextEntry1] : PFT 9/13/22\par Well preserved flow rates\par Lung Volumes normal\par DLCO 63 % with mild loss fx  alveolar capillary units\par  HGB 15.2\par pos decline at DLCO\par \par Chest x-ray PA lateral\par Normal cardiac size\par Prominence of the right fissure\par No dominant pulmonary nodules\par Note pleural effusions pneumothorax\par Positive hyperaeration\par Impression consistent with known COPD\par \par PFT May 27, 2022\par Well-preserved flow rates\par Mild obstructive ventilatory impairment\par Positive bronchodilator response at small airways\par Lung volumes suggest air trapping with increased % predicted\par Specific conductance and resistance normal range\par Diffusion normal range 75% predicted\par Data comparison January 11, 2022 demonstrates interval improvement at the total lung capacity otherwise overall stable pulmonary physiology\par \par Pulmonary 6-minute walk exercise study May 27, 2022\par Baseline room air O2 saturation 98%\par Impression normal study\par No room air desaturation\par NIOX 7 normal range \par \par Laboratory data ordered by primary care physician 5/11/22\par Reviewed\par Normal renal function normal electrolytes\par Liver function testing normal\par Uric acid 3.7\par Magnesium 2.2 cholesterol 121 hemoglobin A1c 5.8\par TSH 1.66 \par \par PFT 1/11/22\par Well preserved flow rates\par Mild OAD\par Lung Volumes  nl range\par  Resistance nl\par Sp Conductance decreased\par Low nl DLCO 74 % pred\par HGB 15.1\par NIOX 11 ppb\par \par CT low-dose screening f/u Oct 2022\par October 4, 2021\par Lung RADS 2\par Stable few small pulmonary nodules findings\par Mild secretions in the trachea\par 1 year follow-up recommended\par \par PFT 7/27/21\par Well preserved flow rates\par  Mild OAD\par  Lung Volumes nl\par  Low nl DLCL 73 % \par  HGB 13.0\par  stable pulmonary physiology\par NIOX 11 ppb  nl range\par \par X-ray PA lateral September 27, 2021\par Cardiac size normal\par Grossly clear lung fields\par No appreciable dominant pulmonary nodules\par Maritza mediastinum unremarkable\par Soft tissue bony structures unremarkable\par No pneumothorax\par Increased retrosternal airspace with flattening of the diaphragms consistent with hyper aeration\par Impression COPD\par \par NIOC  10 ppb  4/27/21 NL\par PFT 4/27 21\par Mild OAD\par  Lung Volumes nl\par DLCO 68 % with minimal loss fx  alveolar  capillary units\par  HGB 13.0\par \par Chest CT scan low-dose protocol September 3, 2020 Baseline study\par Did compared to studies of January 15, 2019 January 15, 2017\par Left upper lobe pulmonary nodule 3 mm no change\par Mild tracheobronchial secretions\par Adenopathy negative\par Impression lung RADS 2\par Annual screening low-dose chest CT 1 year\par \par NIOX 8  ppb August 20, 2020\par PFT without bronchodilator August 20, 2020\par Well-preserved flow rates mild obstructive pattern\par Normal lung volumes with borderline air trapping\par Specific conductance and resistance normal\par Reduction diffusion 65% of predicted with a loss of functioning alveolocapillary units\par Hemoglobin 14.9\par \par Pulmonary 6-minute walk stress test August 20, 2020\par Baseline room air O2 saturation 98%\par No desaturation\par Impression normal study\par \par Chest x-ray PA lateral projection January 3, 2019\par Cardiac size normal\par Hyper aeration\par Peribronchial cuffing\par No dominant pulmonary nodules parenchymal infiltrates pleural effusions\par Mediastinum normal\par Increased retrosternal airspace consistent with air-trapping\par Impression COPD\par \par PCV 23 1/2020\par \par Echocardiogram study completed October 19, 2021\par Ejection fraction estimate 65%\par Minimal mitral regurgitation\par Thickened aortic valve\par Mild tricuspid regurgitation\par No pulmonary hypertension\par \par Shringrx up to date\par Check re Prevnair\par HD Flu 9/13/22\par

## 2022-09-13 NOTE — HISTORY OF PRESENT ILLNESS
[Stable] : are stable [None] : ~He/She~ has no significant interval events [Difficulty Breathing During Exertion] : stable dyspnea on exertion [Feelings Of Weakness On Exertion] : stable exercise intolerance [Cough] : denies coughing [Wheezing] : denies wheezing [Regional Soft Tissue Swelling Both Lower Extremities] : denies lower extremity edema [Chest Pain Or Discomfort] : denies chest pain [Fever] : denies fever [Date: ___] : was performed [unfilled] [Wt Gain ___ Lbs] : no recent weight gain [Wt Loss ___ Lbs] : no recent weight loss [Oxygen] : the patient uses no supplemental oxygen [de-identified] : subcm pulmonary nodules/ apical scar [FreeTextEntry1] : ENT laryngeal reflux with pending EGD\par \par side  effects with Chantix  Did  get Nicotine  patch which has  helped from 1 ppd to  1/2  ppd over past  3  weeks\par in past did use Wellbutrin with some mild success but now actively smoking 1/2 ppd\par No Active respiratory symptoms but notes some inc dyspnea primarily stairs\par Continues to smoke pos 6-7 cigs per day\par Addressed all questions regarding immunization profile\par No fevers chills or sweats\par No lower extremity edema\par Denies chest pain chest tightness wheezing sputum production or hemoptysis\par \par

## 2022-09-13 NOTE — DISCUSSION/SUMMARY
[FreeTextEntry1] : COPD with bronchiectasis\par Active tobacco use\par per ENT LPR- pending EGD\par Continue to advised on tobacco cessation\par he remain on Spiriva Asmanex as ordered- OFF\par PRN Pro Air follow-up 3 months\par Prevnair  13 April 2021\par Pneumonia 13/23 Patient states Up To Date\par Flu vaccine up to date 2021\par Shingles x 2 completed\par T DAP  2021\par \par 3-7 minute discussion with patient about smoking cessation was initiated with patient showing interest in attempting to quit smoking. Problems with risks of continued tobacco smoking including respiratory, cardiovascular, and oncological problems were discussed. Advice on smoking cessation was reiterated including methods of quitting, setting a date to quit, and different medicines and support systems available for smoking cessation was discussed. Addressed  options including nicotine patches gums lozenges, Wellbutrin, Chantix as well as smoking cessation program.\par f/u  3 months\par noted discussed use of controller therapy\par Follow-up PFT\par Oct 2022 low-dose CAT scan screening follow-up protocol\par Based on tobacco history and risk factors patient does qualify for low-dose CAT scan screening protocol.  Risks benefits of low-dose screening protocol discussed in detail with patient.\par All questions answered at today's visit.\par \par Cardiology f/u\par Completed MODERNA + booster x 2 with scheduled variant booster\par HD Flu 9/13/22

## 2022-10-14 ENCOUNTER — RX RENEWAL (OUTPATIENT)
Age: 67
End: 2022-10-14

## 2022-11-29 ENCOUNTER — LABORATORY RESULT (OUTPATIENT)
Age: 67
End: 2022-11-29

## 2022-11-29 ENCOUNTER — NON-APPOINTMENT (OUTPATIENT)
Age: 67
End: 2022-11-29

## 2022-11-29 ENCOUNTER — APPOINTMENT (OUTPATIENT)
Dept: CARDIOLOGY | Facility: CLINIC | Age: 67
End: 2022-11-29

## 2022-11-29 VITALS
HEART RATE: 78 BPM | TEMPERATURE: 97.7 F | HEIGHT: 68 IN | DIASTOLIC BLOOD PRESSURE: 68 MMHG | WEIGHT: 137 LBS | RESPIRATION RATE: 16 BRPM | BODY MASS INDEX: 20.76 KG/M2 | SYSTOLIC BLOOD PRESSURE: 112 MMHG | OXYGEN SATURATION: 98 %

## 2022-11-29 PROCEDURE — 99214 OFFICE O/P EST MOD 30 MIN: CPT | Mod: 25

## 2022-11-29 PROCEDURE — 93000 ELECTROCARDIOGRAM COMPLETE: CPT

## 2022-11-29 NOTE — CARDIOLOGY SUMMARY
[___] : [unfilled] [de-identified] : 5/10/22 [de-identified] : NUC 4/30/2018 [de-identified] : 3/5/2019 [de-identified] : CAROTID DOPPLER: 3/5/2019\par NURYS: 3/5/2019

## 2022-11-29 NOTE — ASSESSMENT
[FreeTextEntry1] : This is a 67 year year old male here today for follow up cardiac evaluation. \par He has a past medical history significant for hyperlipidemia, status post cholecystectomy, coronary artery calcification.\par \par Today he is feeling generally well and does not have any complaints at this time. \par \par He wants to make a note that he is following up with his urologist in regards to his BPH, Dr. Johnston. \par \par He is currently on ASA 81mg PO DAILY, Zetia 10mg  PO Daily and Rosuvastatin 40mg PO DAILY which his PCP increased from 20mg for LDL goal of 70 or less. 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 \par -He states that he has been helping his wife who was recently dx with breast Ca and is going through treatment at this time. \par \par -Pt is stable from a cardiac standpoint and does not have any complaints at this time. \par \par -Cardiac risk factors include HLD and hx of smoker.\par \par BLOOD PRESSURE:\par -BP is well controlled in today's visit.\par \par BLOOD WORK:\par -New blood work was done 11/29/2022  to evaluate lipid profile, CBC, BMP, hepatic function, A1C and TSH. \par  -Blood work was done May 10, 2022 demonstrated normal lipid profile.\par -Blood work from 10/27/22 demonstrated Cholesterol of 134 and LDL of 78.\par -Blood work was done 05/25/2021 demonstrated normal lipid profile. \par -Pt currently taking Rosuvastatin 40mg PO DAILY and on Zetia 10mg  PO Daily.\par \par CHOLESTEROL CONTROL:\par -Patient will continue the advised  TLC diet and to continue follow-up for treatment of hyperlipidemia and repeat blood testing with diet and exercise. I have discussed different exercises and the importance of maintenance of optimal body weight. The importance of staying within guidelines and recommendations was stressed to the patient today and they acknowledged that they understand this to me verbally.\par \par  -Mr. COWAN was educated and advised that failure to follow-up with my medical recommendations to lower cholesterol can result in severe health consequences therefore, they will continuing a low saturated and low fat diet and to avoid excessive carbohydrates to help reduce triglycerides and that lowering LDL levels is associated with a significant decrease in serious cardiac events including but not limited to heart attack stroke and overall death. We will continue lipid lowering agents as advised based on blood test results and the patient understands to call if they develop severe muscle discomfort or if they have a reddish tinted discoloration in their urine.\par \par TESTING/REPORTS:\par -EKG done 11/29/2022 which demonstrated regular sinus rhythm with nonspecific ST-T wave changes BPM of 78.\par \par -The patient had normal exercise stress test August 16, 2022\par \par -EKG done 05/10/2022 which demonstrated regular sinus rhythm with nonspecific ST-T wave changes BPM of 66. \par \par -Electrocardiogram done 5/25/2021 which demonstrated normal sinus rhythm rate 84 beats per minute otherwise remarkable for left axis deviation and left atrial abnormality.\par \par -The patient at chest CAT scan in 2017 which demonstrated coronary artery calcification. A new CAT scan done January 15, 2019 confirm the presence of coronary atherosclerosis. There was no detailed definition regarding extent the location of the coronary calcification.\par \par -The patient had a nuclear stress test done by his prior cardiologist April 30, 2018 which demonstrated normal coronary perfusion with an estimated ejection fraction 61% and normal wall motion.\par \par -The patient had a coronary calcium score done February 26, 2019 which demonstrated a total coronary calcium level of 129 units, all in the left anterior descending artery.\par \par -Echocardiogram done 3/5/2019 demonstrated mild mitral tricuspid and pulmonic regurgitation with normal left ventricular systolic function. EF 63%.\par \par -Echocardiogram done on 10/19/2021 demonstrated thickened mitral valve, mild MR, thickened aortic valve, mild TR with normal left ventricular systolic function. EF of 65%.\par \par PLAN:\par -He will continue with his current medications and will contact the office if he is having any complaints between now and their next follow up appointment.\par -The patient will schedule a carotid Doppler to evaluate his carotid artery plaque.\par -The patient will schedule an Echo Doppler examination to evaluate murmur, left ventricular function, chamber size, and rule out hypertrophy. \par \par I have discussed the plan of care with Mr. KAL COWAN  and he  will follow up in 3 months. He is compliant with all of his medications.\par \par The patient understands that aerobic exercises must be increased to minutes 4 times/week and a detailed discussion of lifestyle modification was done today. \par The patient has a good understanding of the diagnosis, treatment plan and lifestyle modification. \par He will contact me at the office for any questions with their care or any changes in their health status.\par \venita Flores NP

## 2022-11-29 NOTE — PHYSICAL EXAM
[Well Developed] : well developed [Well Nourished] : well nourished [No Acute Distress] : no acute distress [Normal Venous Pressure] : normal venous pressure [No Carotid Bruit] : no carotid bruit [Normal S1, S2] : normal S1, S2 [No Rub] : no rub [I] : a grade 1 [No Abnormalities] : the abdominal aorta was not enlarged and no bruit was heard [Clear Lung Fields] : clear lung fields [Good Air Entry] : good air entry [No Respiratory Distress] : no respiratory distress  [Soft] : abdomen soft [Non Tender] : non-tender [No Masses/organomegaly] : no masses/organomegaly [Normal Bowel Sounds] : normal bowel sounds [Normal Gait] : normal gait [No Edema] : no edema [No Cyanosis] : no cyanosis [No Clubbing] : no clubbing [No Varicosities] : no varicosities [No Rash] : no rash [No Skin Lesions] : no skin lesions [Moves all extremities] : moves all extremities [No Focal Deficits] : no focal deficits [Normal Speech] : normal speech [Alert and Oriented] : alert and oriented [Normal memory] : normal memory [General Appearance - Well Developed] : well developed [Normal Appearance] : normal appearance [Well Groomed] : well groomed [General Appearance - Well Nourished] : well nourished [No Deformities] : no deformities [General Appearance - In No Acute Distress] : no acute distress [Normal Conjunctiva] : the conjunctiva exhibited no abnormalities [Normal Oral Mucosa] : normal oral mucosa [Normal Jugular Venous A Waves Present] : normal jugular venous A waves present [Normal Jugular Venous V Waves Present] : normal jugular venous V waves present [No Jugular Venous Kramer A Waves] : no jugular venous kramer A waves [Respiration, Rhythm And Depth] : normal respiratory rhythm and effort [Exaggerated Use Of Accessory Muscles For Inspiration] : no accessory muscle use [Auscultation Breath Sounds / Voice Sounds] : lungs were clear to auscultation bilaterally [Bowel Sounds] : normal bowel sounds [Abdomen Soft] : soft [Abnormal Walk] : normal gait [Gait - Sufficient For Exercise Testing] : the gait was sufficient for exercise testing [Nail Clubbing] : no clubbing of the fingernails [Cyanosis, Localized] : no localized cyanosis [Skin Color & Pigmentation] : normal skin color and pigmentation [Skin Turgor] : normal skin turgor [] : no rash [Oriented To Time, Place, And Person] : oriented to person, place, and time [Affect] : the affect was normal [Mood] : the mood was normal [No Anxiety] : not feeling anxious [5th Left ICS - MCL] : palpated at the 5th LICS in the midclavicular line [Normal] : normal [No Precordial Heave] : no precordial heave was noted [Normal Rate] : normal [Rhythm Regular] : regular [Normal S1] : normal S1 [Normal S2] : normal S2 [No Gallop] : no gallop heard [II] : a grade 2 [2+] : left 2+ [No Pitting Edema] : no pitting edema present [Apical Thrill] : no thrill palpable at the apex [S3] : no S3 [S4] : no S4 [Right Carotid Bruit] : no bruit heard over the right carotid [Left Carotid Bruit] : no bruit heard over the left carotid [Right Femoral Bruit] : no bruit heard over the right femoral artery [Left Femoral Bruit] : no bruit heard over the left femoral artery [FreeTextEntry1] : left bruit vs. transmitted murmur [Click] : no click [Pericardial Rub] : no pericardial rub

## 2022-11-29 NOTE — REVIEW OF SYSTEMS
[SOB] : no shortness of breath [Dyspnea on exertion] : not dyspnea during exertion [Chest Discomfort] : no chest discomfort [Lower Ext Edema] : no extremity edema [Leg Claudication] : no intermittent leg claudication [Palpitations] : no palpitations [Orthopnea] : no orthopnea [PND] : no PND [Syncope] : no syncope [Negative] : Cardiovascular

## 2022-11-29 NOTE — DISCUSSION/SUMMARY
[FreeTextEntry1] : Dr. Brooks-(PRIOR VISIT and PMH WITH Dr. Brooks): \par This is a 67-year-old male with past medical history significant for hyperlipidemia, status post cholecystectomy, coronary artery calcification, who comes in for cardiac follow up evaluation. \par \par Cardiac risk factors include hyperlipidemia, and smoking a half a pack to one pack per day over the last 35 years.\par \par The patient had normal exercise stress test August 16, 2022\par \par Blood work done May 10, 2022 demonstrated cholesterol 121, HDL 35, triglycerides 110, LDL calculated 64 mg/dL, LDL direct 66 mg/dL.\par \par He had a normal nuclear stress test in 2018. \par \par He does complain of occasional cramping in his legs.\par He will have an ankle-brachial index done today to rule out significant peripheral arterial disease given the presence of coronary artery calcification and history of smoking.\par \par He will continue on his current dose of Crestor 40 mg daily, Zetia 10 mg/day.  He will have repeat blood work done in 3 to 4 months for lipid panel and SMA-20.\par \par Blood work done June 9, 2020 demonstrated cholesterol 142, triglycerides 121, HDL of 37, and direct LDL of 88 mg/dL.\par \par The patient will have new blood work today to reevaluate his lipid panel.  He is currently hemodynamically stable and asymptomatic from a cardiac standpoint.\par \par Electrocardiogram done June 9, 2020 which demonstrated normal sinus rhythm rate 80 beats per minute otherwise remarkable for left axis deviation and left atrial abnormality..\par \par The patient will continue on his current dose of Zetia 10 mg per day in addition to his Crestor 20 mg per day.\par \par The patient had a coronary calcium score done February 26, 2019 which demonstrated a total coronary calcium level of 129 units, all in the left anterior descending artery.\par \par The patient had a nuclear stress test done by his prior cardiologist April 30, 2018 which demonstrated normal coronary perfusion with an estimated ejection fraction 61% and normal wall motion.\par \par The patient is instructed to followup with his pulmonologist regarding his smoking cessation.  Smoking cessation was reinforced. \par \par The patient at chest CAT scan in 2017 which demonstrated coronary artery calcification. A new CAT scan done January 15, 2019 confirm the presence of coronary atherosclerosis. There was no detailed definition regarding extent the location of the coronary calcification.\par \par The patient understands that aerobic exercises must be increased to 40 minutes 4 times per week. A detailed discussion of lifestyle modification was done today. The patient has a good understanding of the diagnosis, and treatment plan. Lifestyle modification was also outlined.

## 2022-12-08 ENCOUNTER — APPOINTMENT (OUTPATIENT)
Dept: ORTHOPEDIC SURGERY | Facility: CLINIC | Age: 67
End: 2022-12-08

## 2022-12-08 PROCEDURE — 72110 X-RAY EXAM L-2 SPINE 4/>VWS: CPT

## 2022-12-08 PROCEDURE — 99204 OFFICE O/P NEW MOD 45 MIN: CPT

## 2022-12-08 NOTE — IMAGING
[Facet arthropathy] : Facet arthropathy [Disc space narrowing] : Disc space narrowing [de-identified] : LSPINE\par \par Palpation: No tenderness to palpation or spasm in bilateral thoracic and lumbar paraspinal musculature, no SI joint tenderness to palpation\par ROM: limited forward flexion, with pain\par Strength: 5/5 bilateral hip flexors, knee extensors, ankle dorsiflexors, EHL, ankle plantarflexors\par Sensation: Sensation present to light touch bilateral L2-S1 distributions\par Provocative maneuvers: Negative bilateral straight leg raise\par \par Bilateral hips-\par Palpation: No tenderness to palpation over greater trochanter or IT band\par ROM: No pain with flexion and internal rotation\par  [FreeTextEntry1] : mild DDD

## 2022-12-08 NOTE — HISTORY OF PRESENT ILLNESS
[Lower back] : lower back [Dull/Aching] : dull/aching [Localized] : localized [Meds] : meds [de-identified] : Pt seen by non-spine partners in the past\par \par \par 12/8/22- 68 y/o M presents for midline lower back pain since picking up his dog 3-4 days ago. Denies pain/N/T in BLEs. Aggravated by standing up from seated and by lifting. Has tried Aleve and topical patches, with minimal relief. Denies prior back surgeries/physical therapy. Denies b/b dysfunction.  [] : no [FreeTextEntry5] : KAL 67 year old M here for Lower back, onset pain for 3-4 days , believes he may have pulled a muscle\par he was picking up his dog and turned a certain way \par he has been experiencing constant aching \par he took two Aleve today and yesterday morning , unsure if it helped  [FreeTextEntry9] : Patches  [de-identified] : turning side to side

## 2022-12-08 NOTE — ASSESSMENT
[FreeTextEntry1] : lumbar strain, no radic\par PT, meds\par follow up 6 weeks\par consider imaging if no improvement\par \par NSAIDs- Patient warned of risk of medication to GI tract, increased blood pressure, cardiac risk, and risk of fluid retention.  Advised to clear medication with internist or PCP if any concurrent health problem with heart, blood pressure, or GI system exists.\par \par \par Muscle Relaxants- To help decrease muscle spasm and assist with pain relief. Advised of sedating effects and instructed not to drive, operate heavy machinery, or take with other sedating medications.\par \par Patient seen by Praveena Bill PA-C, and Dr. Jayro Manning M.D. \par \par \par

## 2022-12-12 ENCOUNTER — NON-APPOINTMENT (OUTPATIENT)
Age: 67
End: 2022-12-12

## 2022-12-12 VITALS — HEIGHT: 68 IN | BODY MASS INDEX: 20.76 KG/M2 | WEIGHT: 137 LBS

## 2022-12-12 DIAGNOSIS — F17.200 NICOTINE DEPENDENCE, UNSPECIFIED, UNCOMPLICATED: ICD-10-CM

## 2022-12-12 NOTE — HISTORY OF PRESENT ILLNESS
[Current] : Current [TextBox_13] : Patient is scheduled for a annual  LDCT for lung cancer screening. Shared decision making managed  by Dr. Avilez. Chart review performed to confirm eligibility for LDCT. \par \par  No documented personal or family history of lung cancer. No documented s/s of lung cancer.\par  [PacksperYear] : 35

## 2022-12-13 ENCOUNTER — APPOINTMENT (OUTPATIENT)
Dept: RADIOLOGY | Facility: IMAGING CENTER | Age: 67
End: 2022-12-13

## 2022-12-13 ENCOUNTER — APPOINTMENT (OUTPATIENT)
Dept: CT IMAGING | Facility: IMAGING CENTER | Age: 67
End: 2022-12-13

## 2022-12-13 ENCOUNTER — OUTPATIENT (OUTPATIENT)
Dept: OUTPATIENT SERVICES | Facility: HOSPITAL | Age: 67
LOS: 1 days | End: 2022-12-13
Payer: MEDICARE

## 2022-12-13 DIAGNOSIS — F17.200 NICOTINE DEPENDENCE, UNSPECIFIED, UNCOMPLICATED: ICD-10-CM

## 2022-12-13 PROCEDURE — 71046 X-RAY EXAM CHEST 2 VIEWS: CPT | Mod: 26

## 2022-12-13 PROCEDURE — 71271 CT THORAX LUNG CANCER SCR C-: CPT | Mod: 26

## 2022-12-13 PROCEDURE — 71271 CT THORAX LUNG CANCER SCR C-: CPT

## 2022-12-13 PROCEDURE — 71046 X-RAY EXAM CHEST 2 VIEWS: CPT

## 2022-12-15 ENCOUNTER — NON-APPOINTMENT (OUTPATIENT)
Age: 67
End: 2022-12-15

## 2022-12-16 ENCOUNTER — NON-APPOINTMENT (OUTPATIENT)
Age: 67
End: 2022-12-16

## 2023-01-05 RX ORDER — CYCLOBENZAPRINE HYDROCHLORIDE 5 MG/1
5 TABLET, FILM COATED ORAL EVERY 8 HOURS
Qty: 90 | Refills: 0 | Status: ACTIVE | COMMUNITY
Start: 2022-12-08 | End: 1900-01-01

## 2023-01-10 ENCOUNTER — APPOINTMENT (OUTPATIENT)
Dept: PULMONOLOGY | Facility: CLINIC | Age: 68
End: 2023-01-10
Payer: MEDICARE

## 2023-01-10 VITALS
HEART RATE: 97 BPM | BODY MASS INDEX: 20.16 KG/M2 | OXYGEN SATURATION: 98 % | HEIGHT: 68 IN | SYSTOLIC BLOOD PRESSURE: 105 MMHG | TEMPERATURE: 98.1 F | WEIGHT: 133 LBS | DIASTOLIC BLOOD PRESSURE: 56 MMHG | RESPIRATION RATE: 15 BRPM

## 2023-01-10 LAB — POCT - HEMOGLOBIN (HGB), QUANTITATIVE, TRANSCUTANEOUS: 13.1

## 2023-01-10 PROCEDURE — 88738 HGB QUANT TRANSCUTANEOUS: CPT

## 2023-01-10 PROCEDURE — 99406 BEHAV CHNG SMOKING 3-10 MIN: CPT

## 2023-01-10 PROCEDURE — 94060 EVALUATION OF WHEEZING: CPT

## 2023-01-10 PROCEDURE — 94729 DIFFUSING CAPACITY: CPT

## 2023-01-10 PROCEDURE — 99214 OFFICE O/P EST MOD 30 MIN: CPT | Mod: 25

## 2023-01-10 PROCEDURE — 94727 GAS DIL/WSHOT DETER LNG VOL: CPT

## 2023-01-10 PROCEDURE — ZZZZZ: CPT

## 2023-01-10 NOTE — DISCUSSION/SUMMARY
[FreeTextEntry1] : COPD with bronchiectasis\par Active tobacco use\par per ENT LPR- pending EGD\par Continue to advised on tobacco cessation\par he remain on Spiriva Asmanex as ordered- OFF\par PRN Pro Air follow-up 3 months\par Prevnair  13 April 2021\par Pneumonia 13/23 Patient states Up To Date\par Flu vaccine up to date 2021\par Shingles x 2 completed\par T DAP  2021\par \par 3-7 minute discussion with patient about smoking cessation was initiated with patient showing interest in attempting to quit smoking. Problems with risks of continued tobacco smoking including respiratory, cardiovascular, and oncological problems were discussed. Advice on smoking cessation was reiterated including methods of quitting, setting a date to quit, and different medicines and support systems available for smoking cessation was discussed. Addressed  options including nicotine patches gums lozenges, Wellbutrin, Chantix as well as smoking cessation program.\par f/u  3 months\par noted discussed use of controller therapy\par Follow-up PFT\par Oct 2023 low-dose CAT scan screening follow-up protocol\par Based on tobacco history and risk factors patient does qualify for low-dose CAT scan screening protocol.  Risks benefits of low-dose screening protocol discussed in detail with patient.\par All questions answered at today's visit.\par \par Cardiology f/u\par Completed MODERNA + booster x 2 with  variant booster\par HD Flu 9/13/22

## 2023-01-10 NOTE — PROCEDURE
[FreeTextEntry1] : PFT 1/10/23\par  Well preserved flow  rates\par  Mild OAD\par Lung Volumes nl\par  DLCO 63% mild loss fx  alveolar \par IMP\par copd\par \par EXAM: [id number] - CT LDCT LUNG CA SCREENING - ORDERED BY: JIM WALLIS\par \par \par PROCEDURE DATE: 12/13/2022\par \par \par \par INTERPRETATION: INDICATION: >20 pack year history of smoking. Individual is Current smoker. Lung cancer screening.\par \par TECHNIQUE: Low dose CT scan of the chest was obtained without intravenous contrast.\par \par COMPARISON: October 4, 2021\par \par Coronary artery calcification: Moderate\par \par Mediastinal and/heart/vessels: Thyroid gland is unremarkable. Aorta and pulmonary arteries are normal in size. No pericardial effusion. No lymphadenopathy.\par \par Airways/lung/pleura: Right lower lobe 3 mm nodule on series 2 image 106 is unchanged. Previously mentioned right upper lobe nodule not seen clearly on the present examination. Biapical pleural-parenchymal scarring is noted.\par \par Visible abdomen: Unremarkable noncontrast appearance.\par \par Soft tissue/bones: No suspicious osseous lesions.\par \par Impression:\par Stable right lower lobe nodule as above. Previously described right upper lobe nodules not clearly identified.\par \par Lung-RADS Category 2: Benign Appearance or Behavior. Nodules with a very low likelihood of becoming a clinically active cancer due to size or lack of growth. Continue annual screening with low-dose CT in 12 months.\par \par --- End of Report ---\par \par \par \par \par \par \par CAROLANN BAGLEY MD; Attending Radiologist\par This document has been electronically signed. Dec 16 2022 12:37PM\par \par \par Provider Note\par CT chest 12/13/2022 lung RADS category 2\par Right lower lobe 3 mm pulmonary nodule\par Changed\par Previously noted right upper lobe nodule not seen on present exam\par Biapical pleural-parenchymal scar\par 1 year follow-up low-dose protocol\par Patient informed\par \par PFT 9/13/22\par Well preserved flow rates\par Lung Volumes normal\par DLCO 63 % with mild loss fx  alveolar capillary units\par HGB13.1\par  HGB 15.2\par pos decline at DLCO\par \par Chest x-ray PA lateral\par Normal cardiac size\par Prominence of the right fissure\par No dominant pulmonary nodules\par Note pleural effusions pneumothorax\par Positive hyperaeration\par Impression consistent with known COPD\par \par PFT May 27, 2022\par Well-preserved flow rates\par Mild obstructive ventilatory impairment\par Positive bronchodilator response at small airways\par Lung volumes suggest air trapping with increased % predicted\par Specific conductance and resistance normal range\par Diffusion normal range 75% predicted\par Data comparison January 11, 2022 demonstrates interval improvement at the total lung capacity otherwise overall stable pulmonary physiology\par \par Pulmonary 6-minute walk exercise study May 27, 2022\par Baseline room air O2 saturation 98%\par Impression normal study\par No room air desaturation\par NIOX 7 normal range \par \par Laboratory data ordered by primary care physician 5/11/22\par Reviewed\par Normal renal function normal electrolytes\par Liver function testing normal\par Uric acid 3.7\par Magnesium 2.2 cholesterol 121 hemoglobin A1c 5.8\par TSH 1.66 \par \par PFT 1/11/22\par Well preserved flow rates\par Mild OAD\par Lung Volumes  nl range\par  Resistance nl\par Sp Conductance decreased\par Low nl DLCO 74 % pred\par HGB 15.1\par NIOX 11 ppb\par \par CT low-dose screening f/u Oct 2022\par October 4, 2021\par Lung RADS 2\par Stable few small pulmonary nodules findings\par Mild secretions in the trachea\par 1 year follow-up recommended\par \par PFT 7/27/21\par Well preserved flow rates\par  Mild OAD\par  Lung Volumes nl\par  Low nl DLCL 73 % \par  HGB 13.0\par  stable pulmonary physiology\par NIOX 11 ppb  nl range\par \par X-ray PA lateral September 27, 2021\par Cardiac size normal\par Grossly clear lung fields\par No appreciable dominant pulmonary nodules\par Maritza mediastinum unremarkable\par Soft tissue bony structures unremarkable\par No pneumothorax\par Increased retrosternal airspace with flattening of the diaphragms consistent with hyper aeration\par Impression COPD\par \par NIOC  10 ppb  4/27/21 NL\par PFT 4/27 21\par Mild OAD\par  Lung Volumes nl\par DLCO 68 % with minimal loss fx  alveolar  capillary units\par  HGB 13.0\par \par Chest CT scan low-dose protocol September 3, 2020 Baseline study\par Did compared to studies of January 15, 2019 January 15, 2017\par Left upper lobe pulmonary nodule 3 mm no change\par Mild tracheobronchial secretions\par Adenopathy negative\par Impression lung RADS 2\par Annual screening low-dose chest CT 1 year\par \par NIOX 8  ppb August 20, 2020\par PFT without bronchodilator August 20, 2020\par Well-preserved flow rates mild obstructive pattern\par Normal lung volumes with borderline air trapping\par Specific conductance and resistance normal\par Reduction diffusion 65% of predicted with a loss of functioning alveolocapillary units\par Hemoglobin 14.9\par \par Pulmonary 6-minute walk stress test August 20, 2020\par Baseline room air O2 saturation 98%\par No desaturation\par Impression normal study\par \par Chest x-ray PA lateral projection January 3, 2019\par Cardiac size normal\par Hyper aeration\par Peribronchial cuffing\par No dominant pulmonary nodules parenchymal infiltrates pleural effusions\par Mediastinum normal\par Increased retrosternal airspace consistent with air-trapping\par Impression COPD\par \par PCV 23 1/2020\par \par Echocardiogram study completed October 19, 2021\par Ejection fraction estimate 65%\par Minimal mitral regurgitation\par Thickened aortic valve\par Mild tricuspid regurgitation\par No pulmonary hypertension\par \par Shringrx up to date\par Check re Prevnair COMPLETED 20 \par HD Flu 9/13/22\par

## 2023-01-10 NOTE — HISTORY OF PRESENT ILLNESS
[Current] : current [>= 20 pack years] : >= 20 pack years [Stable] : are stable [None] : ~He/She~ has no significant interval events [Difficulty Breathing During Exertion] : stable dyspnea on exertion [Feelings Of Weakness On Exertion] : stable exercise intolerance [Cough] : denies coughing [Wheezing] : denies wheezing [Regional Soft Tissue Swelling Both Lower Extremities] : denies lower extremity edema [Chest Pain Or Discomfort] : denies chest pain [Fever] : denies fever [Wt Gain ___ Lbs] : no recent weight gain [Wt Loss ___ Lbs] : no recent weight loss [Oxygen] : the patient uses no supplemental oxygen [Date: ___] : was performed [unfilled] [de-identified] : subcm pulmonary nodules/ apical scar [FreeTextEntry1] : ENT laryngeal reflux with pending EGD\par \par side  effects with Chantix  Did  get Nicotine  patch which has  helped from 1 ppd to  1/2  ppd over past  3  weeks\par in past did use Wellbutrin with some mild success but now actively smoking 1/2 ppd\par No Active respiratory symptoms but notes some inc dyspnea primarily stairs\par Continues to smoke pos 6-7 cigs per day\par Addressed all questions regarding immunization profile\par No fevers chills or sweats\par No lower extremity edema\par Denies chest pain chest tightness wheezing sputum production or hemoptysis\par \par

## 2023-01-19 ENCOUNTER — FORM ENCOUNTER (OUTPATIENT)
Age: 68
End: 2023-01-19

## 2023-01-19 ENCOUNTER — APPOINTMENT (OUTPATIENT)
Dept: ORTHOPEDIC SURGERY | Facility: CLINIC | Age: 68
End: 2023-01-19
Payer: MEDICARE

## 2023-01-19 DIAGNOSIS — S39.012A STRAIN OF MUSCLE, FASCIA AND TENDON OF LOWER BACK, INITIAL ENCOUNTER: ICD-10-CM

## 2023-01-19 DIAGNOSIS — M54.16 RADICULOPATHY, LUMBAR REGION: ICD-10-CM

## 2023-01-19 DIAGNOSIS — M51.36 OTHER INTERVERTEBRAL DISC DEGENERATION, LUMBAR REGION: ICD-10-CM

## 2023-01-19 PROCEDURE — 99214 OFFICE O/P EST MOD 30 MIN: CPT

## 2023-01-20 ENCOUNTER — APPOINTMENT (OUTPATIENT)
Dept: MRI IMAGING | Facility: CLINIC | Age: 68
End: 2023-01-20
Payer: MEDICARE

## 2023-01-20 PROCEDURE — 72148 MRI LUMBAR SPINE W/O DYE: CPT | Mod: MH

## 2023-01-23 NOTE — ASSESSMENT
[FreeTextEntry1] : Patient has failed 6 weeks of conservative care, including physical therapy and medications. Will obtain MRI to rule out HNP; will also be used to guide potential future injections/surgical management. \par C/w PT\par \par NSAIDs- Patient warned of risk of medication to GI tract, increased blood pressure, cardiac risk, and risk of fluid retention.  Advised to clear medication with internist or PCP if any concurrent health problem with heart, blood pressure, or GI system exists.\par \par Muscle Relaxants- To help decrease muscle spasm and assist with pain relief. Advised of sedating effects and instructed not to drive, operate heavy machinery, or take with other sedating medications.\par \par \par

## 2023-01-23 NOTE — IMAGING
[Facet arthropathy] : Facet arthropathy [Disc space narrowing] : Disc space narrowing [de-identified] : LSPINE\par \par Palpation: No tenderness to palpation or spasm in bilateral thoracic and lumbar paraspinal musculature, no SI joint tenderness to palpation\par ROM: limited forward flexion, with pain\par Strength: 5/5 bilateral hip flexors, knee extensors, ankle dorsiflexors, EHL, ankle plantarflexors\par Sensation: Sensation present to light touch bilateral L2-S1 distributions\par Provocative maneuvers: Negative bilateral straight leg raise\par \par Bilateral hips-\par Palpation: No tenderness to palpation over greater trochanter or IT band\par ROM: No pain with flexion and internal rotation\par  [FreeTextEntry1] : mild DDD

## 2023-01-23 NOTE — ADDENDUM
[FreeTextEntry1] : Lumbar MRI 1/23/23\par Findings: Lordosis is maintained. There is acute to subacute superior endplate compression fracture at L1 with\par loss of height and marrow edema. No bony retropulsion. No pre- or paravertebral soft tissue hematoma. There is \par acute to subacute Schmorl's node with marrow edema at superior T12.\par T12-L1: No herniation, foraminal stenosis, or central stenosis. Broad bulge and facet hypertrophy.\par L1-L2: Loss of disc signal. No herniation, foraminal stenosis, or central stenosis. Facet hypertrophy.\par L2-L3: No herniation, foraminal stenosis, or central stenosis. Facet hypertrophy.\par L3-L4: Loss of disc signal. Bulge and facet hypertrophy with no herniation, foraminal stenosis, or central \par stenosis.\par L4-L5: Loss of disc signal. Bulge and facet hypertrophy with no foraminal stenosis. No central herniation or \par central stenosis.\par L5-S1: Minor retrolisthesis. Broad bulge and facet hypertrophy with inferior foraminal stenosis. Bulge and\par central herniation impressing on the thecal sac with no central stenosis.\par Conus terminates normally at L2. No aneurysm of the aorta. Soft tissues are intact. No muscle tear. No ligament \par tear. No fluid collection.\par \par Discussed with patient. Has continued LBP limiting ADLs, unable to tolerate PT. Discussed all treatment options. \par \par Indicating for L1 kyphoplasty\par We have discussed the risks, benefits, and alternatives of the procedure including the risk of bleeding, infection, neurologic injury, cement leakage, adjacent level fracture, allergic reaction to cement, exacerbation of pain and the need for future procedures.

## 2023-01-23 NOTE — HISTORY OF PRESENT ILLNESS
[Lower back] : lower back [Dull/Aching] : dull/aching [Localized] : localized [Meds] : meds [de-identified] : Pt seen by non-spine partners in the past\par \par \par 12/8/22- 68 y/o M presents for midline lower back pain since picking up his dog 3-4 days ago. Denies pain/N/T in BLEs. Aggravated by standing up from seated and by lifting. Has tried Aleve and topical patches, with minimal relief. Denies prior back surgeries/physical therapy. Denies b/b dysfunction. \par 1/19/23- PT somewhat helpful. No pain distally to the legs. Pain primarily in the LB on the ride side to flank and suprerior glute [] : no [FreeTextEntry5] : KAL 67 year old M here for  Lower back , PT was helping , he still has discomfort with certain movement and picking up heavy items \par pt states naproxen was not helping and was causing him constipation  [FreeTextEntry9] : Patches  [de-identified] : turning side to side

## 2023-01-26 ENCOUNTER — APPOINTMENT (OUTPATIENT)
Dept: ORTHOPEDIC SURGERY | Facility: CLINIC | Age: 68
End: 2023-01-26

## 2023-02-01 ENCOUNTER — OUTPATIENT (OUTPATIENT)
Dept: OUTPATIENT SERVICES | Facility: HOSPITAL | Age: 68
LOS: 1 days | End: 2023-02-01
Payer: MEDICARE

## 2023-02-01 ENCOUNTER — NON-APPOINTMENT (OUTPATIENT)
Age: 68
End: 2023-02-01

## 2023-02-01 ENCOUNTER — APPOINTMENT (OUTPATIENT)
Dept: CARDIOLOGY | Facility: CLINIC | Age: 68
End: 2023-02-01
Payer: MEDICARE

## 2023-02-01 VITALS
WEIGHT: 130 LBS | HEART RATE: 74 BPM | RESPIRATION RATE: 16 BRPM | HEIGHT: 68 IN | OXYGEN SATURATION: 99 % | BODY MASS INDEX: 19.7 KG/M2 | TEMPERATURE: 98.2 F | DIASTOLIC BLOOD PRESSURE: 70 MMHG | SYSTOLIC BLOOD PRESSURE: 105 MMHG

## 2023-02-01 VITALS
SYSTOLIC BLOOD PRESSURE: 112 MMHG | RESPIRATION RATE: 17 BRPM | OXYGEN SATURATION: 98 % | TEMPERATURE: 99 F | HEART RATE: 89 BPM | HEIGHT: 68 IN | DIASTOLIC BLOOD PRESSURE: 60 MMHG | WEIGHT: 132.06 LBS

## 2023-02-01 DIAGNOSIS — Z87.438 PERSONAL HISTORY OF OTHER DISEASES OF MALE GENITAL ORGANS: ICD-10-CM

## 2023-02-01 DIAGNOSIS — Z90.49 ACQUIRED ABSENCE OF OTHER SPECIFIED PARTS OF DIGESTIVE TRACT: Chronic | ICD-10-CM

## 2023-02-01 DIAGNOSIS — Z01.818 ENCOUNTER FOR OTHER PREPROCEDURAL EXAMINATION: ICD-10-CM

## 2023-02-01 DIAGNOSIS — F17.200 NICOTINE DEPENDENCE, UNSPECIFIED, UNCOMPLICATED: ICD-10-CM

## 2023-02-01 DIAGNOSIS — S32.010A WEDGE COMPRESSION FRACTURE OF FIRST LUMBAR VERTEBRA, INITIAL ENCOUNTER FOR CLOSED FRACTURE: ICD-10-CM

## 2023-02-01 DIAGNOSIS — E78.5 HYPERLIPIDEMIA, UNSPECIFIED: ICD-10-CM

## 2023-02-01 DIAGNOSIS — Z01.810 ENCOUNTER FOR PREPROCEDURAL CARDIOVASCULAR EXAMINATION: ICD-10-CM

## 2023-02-01 DIAGNOSIS — K58.9 IRRITABLE BOWEL SYNDROME WITHOUT DIARRHEA: ICD-10-CM

## 2023-02-01 DIAGNOSIS — C44.320 SQUAMOUS CELL CARCINOMA OF SKIN OF UNSPECIFIED PARTS OF FACE: Chronic | ICD-10-CM

## 2023-02-01 LAB
A1C WITH ESTIMATED AVERAGE GLUCOSE RESULT: 5.8 % — HIGH (ref 4–5.6)
ANION GAP SERPL CALC-SCNC: 5 MMOL/L — SIGNIFICANT CHANGE UP (ref 5–17)
APTT BLD: 35.4 SEC — SIGNIFICANT CHANGE UP (ref 27.5–35.5)
BLD GP AB SCN SERPL QL: SIGNIFICANT CHANGE UP
BUN SERPL-MCNC: 22 MG/DL — HIGH (ref 7–18)
CALCIUM SERPL-MCNC: 9.5 MG/DL — SIGNIFICANT CHANGE UP (ref 8.4–10.5)
CHLORIDE SERPL-SCNC: 106 MMOL/L — SIGNIFICANT CHANGE UP (ref 96–108)
CO2 SERPL-SCNC: 26 MMOL/L — SIGNIFICANT CHANGE UP (ref 22–31)
CREAT SERPL-MCNC: 0.67 MG/DL — SIGNIFICANT CHANGE UP (ref 0.5–1.3)
EGFR: 102 ML/MIN/1.73M2 — SIGNIFICANT CHANGE UP
ESTIMATED AVERAGE GLUCOSE: 120 MG/DL — HIGH (ref 68–114)
GLUCOSE SERPL-MCNC: 108 MG/DL — HIGH (ref 70–99)
HCT VFR BLD CALC: 42.6 % — SIGNIFICANT CHANGE UP (ref 39–50)
HGB BLD-MCNC: 14.1 G/DL — SIGNIFICANT CHANGE UP (ref 13–17)
INR BLD: 1.07 RATIO — SIGNIFICANT CHANGE UP (ref 0.88–1.16)
MCHC RBC-ENTMCNC: 31.9 PG — SIGNIFICANT CHANGE UP (ref 27–34)
MCHC RBC-ENTMCNC: 33.1 GM/DL — SIGNIFICANT CHANGE UP (ref 32–36)
MCV RBC AUTO: 96.4 FL — SIGNIFICANT CHANGE UP (ref 80–100)
NRBC # BLD: 0 /100 WBCS — SIGNIFICANT CHANGE UP (ref 0–0)
PLATELET # BLD AUTO: 211 K/UL — SIGNIFICANT CHANGE UP (ref 150–400)
POTASSIUM SERPL-MCNC: 4.4 MMOL/L — SIGNIFICANT CHANGE UP (ref 3.5–5.3)
POTASSIUM SERPL-SCNC: 4.4 MMOL/L — SIGNIFICANT CHANGE UP (ref 3.5–5.3)
PROTHROM AB SERPL-ACNC: 12.8 SEC — SIGNIFICANT CHANGE UP (ref 10.5–13.4)
RBC # BLD: 4.42 M/UL — SIGNIFICANT CHANGE UP (ref 4.2–5.8)
RBC # FLD: 11.5 % — SIGNIFICANT CHANGE UP (ref 10.3–14.5)
SODIUM SERPL-SCNC: 137 MMOL/L — SIGNIFICANT CHANGE UP (ref 135–145)
WBC # BLD: 7.46 K/UL — SIGNIFICANT CHANGE UP (ref 3.8–10.5)
WBC # FLD AUTO: 7.46 K/UL — SIGNIFICANT CHANGE UP (ref 3.8–10.5)

## 2023-02-01 PROCEDURE — 99214 OFFICE O/P EST MOD 30 MIN: CPT | Mod: 25

## 2023-02-01 PROCEDURE — G0463: CPT

## 2023-02-01 PROCEDURE — 93000 ELECTROCARDIOGRAM COMPLETE: CPT | Mod: NC

## 2023-02-01 RX ORDER — SODIUM CHLORIDE 9 MG/ML
3 INJECTION INTRAMUSCULAR; INTRAVENOUS; SUBCUTANEOUS EVERY 8 HOURS
Refills: 0 | Status: DISCONTINUED | OUTPATIENT
Start: 2023-02-08 | End: 2023-02-22

## 2023-02-01 NOTE — ASSESSMENT
[FreeTextEntry1] : This is a 67 year year old male here today for follow up cardiac evaluation. \par He has a past medical history significant for hyperlipidemia, status post cholecystectomy, coronary artery calcification.\par \par Today he is feeling generally well and does not have any complaints at this time.  He is here today for cardiac clearance for back surgery, kyphoplasty to his L1 to be done on February 8, 2023 by Dr. Manning at [facility].\par \par He is currently on ASA 81mg PO DAILY, Zetia 10mg  PO Daily and Rosuvastatin 40mg PO DAILY.  Patient states he already stopped his baby aspirin last week.  He is on aspirin for primary prevention.\par \par He wants to make a note that he is following up with his urologist in regards to his BPH, Dr. Johnston. 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 \par -He states that he has been helping his wife who was recently dx with breast Ca and is going through treatment at this time. \par \par -Pt is stable from a cardiac standpoint and does not have any complaints at this time. \par \par -Cardiac risk factors include HLD and hx of smoker.\par \par BLOOD PRESSURE:\par -BP is well controlled in today's visit.\par \par BLOOD WORK:\par -New blood work was done 11/29/2022 which demonstrated normal lipid profile and values.\par  -Blood work was done May 10, 2022 demonstrated normal lipid profile.\par -Blood work from 10/27/22 demonstrated Cholesterol of 134 and LDL of 78.\par -Blood work was done 05/25/2021 demonstrated normal lipid profile. \par -Pt currently taking Rosuvastatin 40mg PO DAILY and on Zetia 10mg  PO Daily.\par \par CHOLESTEROL CONTROL:\par -Patient will continue the advised  TLC diet and to continue follow-up for treatment of hyperlipidemia and repeat blood testing with diet and exercise. I have discussed different exercises and the importance of maintenance of optimal body weight. The importance of staying within guidelines and recommendations was stressed to the patient today and they acknowledged that they understand this to me verbally.\par \par  -Mr. COWAN was educated and advised that failure to follow-up with my medical recommendations to lower cholesterol can result in severe health consequences therefore, they will continuing a low saturated and low fat diet and to avoid excessive carbohydrates to help reduce triglycerides and that lowering LDL levels is associated with a significant decrease in serious cardiac events including but not limited to heart attack stroke and overall death. We will continue lipid lowering agents as advised based on blood test results and the patient understands to call if they develop severe muscle discomfort or if they have a reddish tinted discoloration in their urine.\par \par TESTING/REPORTS:\par -EKG done 02/01/2023 which demonstrated regular sinus rhythm with nonspecific ST-T wave changes BPM of 74.\par \par -EKG done 11/29/2022 which demonstrated regular sinus rhythm with nonspecific ST-T wave changes BPM of 78.\par \par -The patient had normal exercise stress test August 16, 2022\par \par -EKG done 05/10/2022 which demonstrated regular sinus rhythm with nonspecific ST-T wave changes BPM of 66. \par \par -Electrocardiogram done 5/25/2021 which demonstrated normal sinus rhythm rate 84 beats per minute otherwise remarkable for left axis deviation and left atrial abnormality.\par \par -The patient at chest CAT scan in 2017 which demonstrated coronary artery calcification. A new CAT scan done January 15, 2019 confirm the presence of coronary atherosclerosis. There was no detailed definition regarding extent the location of the coronary calcification.\par \par -The patient had a nuclear stress test done by his prior cardiologist April 30, 2018 which demonstrated normal coronary perfusion with an estimated ejection fraction 61% and normal wall motion.\par \par -The patient had a coronary calcium score done February 26, 2019 which demonstrated a total coronary calcium level of 129 units, all in the left anterior descending artery.\par \par -Echocardiogram done 3/5/2019 demonstrated mild mitral tricuspid and pulmonic regurgitation with normal left ventricular systolic function. EF 63%.\par \par -Echocardiogram done on 10/19/2021 demonstrated thickened mitral valve, mild MR, thickened aortic valve, mild TR with normal left ventricular systolic function. EF of 65%.\par \par PLAN:\par -The patient is clear from a cardiac standpoint. Please avoid overhydration. The patient should be allowed to take their usual medications in the perioperative period. Maintain proper DVT prophylaxis. The patient should have an incentive spirometer in the perioperative period. \par -He will continue with his current medications and will contact the office if he is having any complaints between now and their next follow up appointment.\par \par I have discussed the plan of care with Mr. KAL COWAN  and he  will follow up in 3 months. He is compliant with all of his medications.\par \par The patient understands that aerobic exercises must be increased to minutes 4 times/week and a detailed discussion of lifestyle modification was done today. \par The patient has a good understanding of the diagnosis, treatment plan and lifestyle modification. \par He will contact me at the office for any questions with their care or any changes in their health status.\par \par Mark CABRAL

## 2023-02-01 NOTE — H&P PST ADULT - HISTORY OF PRESENT ILLNESS
67 yr old male with history BPH, IBS, HLD, Smoker, skin cancer (removal on face) PSH h/o laparoscopic cholecystectomy and skin cancer removal. Pt  presents with lifting a dog and heard a snap this happened in Dec 2022. Pt seen by PCP had X Ray performed and PT for one month with limited relief also used naprosyn with minimal effect. Pt had MRI recent which indicated a slight fracture on his spine. Pt scheduled for Kyphoplasty LI on 2/8/2023. All PST blood work done today pt to obtain medical clearance and Covid to be done.

## 2023-02-01 NOTE — CARDIOLOGY SUMMARY
[___] : [unfilled] [de-identified] : 5/10/22 [de-identified] : NUC 4/30/2018 [de-identified] : 3/5/2019 [de-identified] : CAROTID DOPPLER: 3/5/2019\par NURYS: 3/5/2019

## 2023-02-01 NOTE — H&P PST ADULT - NSICDXPASTMEDICALHX_GEN_ALL_CORE_FT
PAST MEDICAL HISTORY:  BPH (benign prostatic hyperplasia)     Closed wedge compression fracture of L1 vertebra, initial encounter     HLD (hyperlipidemia)     IBS (irritable bowel syndrome)

## 2023-02-01 NOTE — H&P PST ADULT - NSICDXPASTSURGICALHX_GEN_ALL_CORE_FT
PAST SURGICAL HISTORY:  History of laparoscopic cholecystectomy     Squamous cell skin cancer, face

## 2023-02-01 NOTE — H&P PST ADULT - NSANTHOSAYNRD_GEN_A_CORE
No. TRUDY screening performed.  STOP BANG Legend: 0-2 = LOW Risk; 3-4 = INTERMEDIATE Risk; 5-8 = HIGH Risk

## 2023-02-01 NOTE — H&P PST ADULT - PROBLEM SELECTOR PLAN 4
Scheduled for Kyphoplasty L1    Pt to instructed to be NPO the night before and the morning of surgery. Pt instructed to wash with chlorhexidene 4% solution for 3 days including the morning of surgery. Written instructions given pt verbalized directions. Escort required post procedure. Pt instructed to use only Tylenol for pain no NSAID.    Stop Bang Score=2 Pt is low risk for TRUDY

## 2023-02-01 NOTE — H&P PST ADULT - PAIN SCORE
Chronic emphysematous cholecystitis, possible cholecystogastric fistula on prior scan
Coffee ground emesis
AFib
7

## 2023-02-01 NOTE — H&P PST ADULT - ASSESSMENT
67 yr old male with history BPH, IBS, HLD,Smoker,  skin cancer (removal on face) PSH h/o laparoscopic cholecystectomy and skin cancer removal. Pt  presents with lifting a dog and heard a snap this happened in Dec 2022. Pt seen by PCP had X Ray performed and PT for one month with limited relief also used naprosyn with minimal effect. Pt had MRI recent which indicated a slight fracture on his spine. Pt scheduled for Kyphoplasty LI on 2/8/2023. All PST blood work done today pt to obtain medical clearance and Covid to be done.

## 2023-02-02 LAB
MRSA PCR RESULT.: SIGNIFICANT CHANGE UP
S AUREUS DNA NOSE QL NAA+PROBE: SIGNIFICANT CHANGE UP

## 2023-02-06 RX ORDER — NAPROXEN 500 MG/1
500 TABLET, DELAYED RELEASE ORAL
Qty: 60 | Refills: 0 | Status: COMPLETED | COMMUNITY
Start: 2022-12-08 | End: 2023-02-06

## 2023-02-06 RX ORDER — ASPIRIN 81 MG/1
81 TABLET ORAL
Refills: 0 | Status: COMPLETED | COMMUNITY
Start: 2019-03-27 | End: 2023-02-06

## 2023-02-07 ENCOUNTER — TRANSCRIPTION ENCOUNTER (OUTPATIENT)
Age: 68
End: 2023-02-07

## 2023-02-08 ENCOUNTER — APPOINTMENT (OUTPATIENT)
Dept: ORTHOPEDIC SURGERY | Facility: HOSPITAL | Age: 68
End: 2023-02-08
Payer: MEDICARE

## 2023-02-08 ENCOUNTER — TRANSCRIPTION ENCOUNTER (OUTPATIENT)
Age: 68
End: 2023-02-08

## 2023-02-08 ENCOUNTER — OUTPATIENT (OUTPATIENT)
Dept: OUTPATIENT SERVICES | Facility: HOSPITAL | Age: 68
LOS: 1 days | Discharge: HOME | End: 2023-02-08
Payer: MEDICARE

## 2023-02-08 VITALS
HEIGHT: 68 IN | DIASTOLIC BLOOD PRESSURE: 72 MMHG | OXYGEN SATURATION: 98 % | HEART RATE: 108 BPM | WEIGHT: 132.06 LBS | SYSTOLIC BLOOD PRESSURE: 124 MMHG | RESPIRATION RATE: 16 BRPM | TEMPERATURE: 98 F

## 2023-02-08 VITALS
DIASTOLIC BLOOD PRESSURE: 82 MMHG | HEART RATE: 80 BPM | SYSTOLIC BLOOD PRESSURE: 132 MMHG | TEMPERATURE: 98 F | RESPIRATION RATE: 18 BRPM | OXYGEN SATURATION: 98 %

## 2023-02-08 DIAGNOSIS — S32.010A WEDGE COMPRESSION FRACTURE OF FIRST LUMBAR VERTEBRA, INITIAL ENCOUNTER FOR CLOSED FRACTURE: ICD-10-CM

## 2023-02-08 DIAGNOSIS — C44.320 SQUAMOUS CELL CARCINOMA OF SKIN OF UNSPECIFIED PARTS OF FACE: Chronic | ICD-10-CM

## 2023-02-08 DIAGNOSIS — Z90.49 ACQUIRED ABSENCE OF OTHER SPECIFIED PARTS OF DIGESTIVE TRACT: Chronic | ICD-10-CM

## 2023-02-08 LAB — BLD GP AB SCN SERPL QL: SIGNIFICANT CHANGE UP

## 2023-02-08 PROCEDURE — 86901 BLOOD TYPING SEROLOGIC RH(D): CPT

## 2023-02-08 PROCEDURE — 86850 RBC ANTIBODY SCREEN: CPT

## 2023-02-08 PROCEDURE — 97162 PT EVAL MOD COMPLEX 30 MIN: CPT

## 2023-02-08 PROCEDURE — 86900 BLOOD TYPING SEROLOGIC ABO: CPT

## 2023-02-08 PROCEDURE — 22514 PERQ VERTEBRAL AUGMENTATION: CPT

## 2023-02-08 PROCEDURE — C1713: CPT

## 2023-02-08 PROCEDURE — 76000 FLUOROSCOPY <1 HR PHYS/QHP: CPT

## 2023-02-08 PROCEDURE — 36415 COLL VENOUS BLD VENIPUNCTURE: CPT

## 2023-02-08 DEVICE — KIT SYS AUTOFLEX W/ HV CEMENT: Type: IMPLANTABLE DEVICE | Site: BILATERAL | Status: FUNCTIONAL

## 2023-02-08 RX ORDER — ERGOCALCIFEROL 1.25 MG/1
1 CAPSULE ORAL
Qty: 0 | Refills: 0 | DISCHARGE

## 2023-02-08 RX ORDER — OXYCODONE AND ACETAMINOPHEN 5; 325 MG/1; MG/1
1 TABLET ORAL
Qty: 35 | Refills: 0
Start: 2023-02-08

## 2023-02-08 RX ORDER — ASPIRIN/CALCIUM CARB/MAGNESIUM 324 MG
0 TABLET ORAL
Qty: 0 | Refills: 0 | DISCHARGE

## 2023-02-08 RX ORDER — DIAZEPAM 5 MG
1 TABLET ORAL
Qty: 0 | Refills: 0 | DISCHARGE

## 2023-02-08 RX ORDER — ALFUZOSIN HYDROCHLORIDE 10 MG/1
1 TABLET, EXTENDED RELEASE ORAL
Qty: 0 | Refills: 0 | DISCHARGE

## 2023-02-08 RX ORDER — FINASTERIDE 5 MG/1
1 TABLET, FILM COATED ORAL
Qty: 0 | Refills: 0 | DISCHARGE

## 2023-02-08 RX ORDER — KETOROLAC TROMETHAMINE 30 MG/ML
15 SYRINGE (ML) INJECTION EVERY 6 HOURS
Refills: 0 | Status: DISCONTINUED | OUTPATIENT
Start: 2023-02-08 | End: 2023-02-08

## 2023-02-08 RX ORDER — CHLORHEXIDINE GLUCONATE 213 G/1000ML
1 SOLUTION TOPICAL ONCE
Refills: 0 | Status: DISCONTINUED | OUTPATIENT
Start: 2023-02-08 | End: 2023-02-08

## 2023-02-08 RX ORDER — ONDANSETRON 8 MG/1
4 TABLET, FILM COATED ORAL ONCE
Refills: 0 | Status: DISCONTINUED | OUTPATIENT
Start: 2023-02-08 | End: 2023-02-08

## 2023-02-08 RX ORDER — ROSUVASTATIN CALCIUM 5 MG/1
1 TABLET ORAL
Qty: 0 | Refills: 0 | DISCHARGE

## 2023-02-08 RX ORDER — ONDANSETRON 8 MG/1
4 TABLET, FILM COATED ORAL EVERY 6 HOURS
Refills: 0 | Status: DISCONTINUED | OUTPATIENT
Start: 2023-02-08 | End: 2023-02-22

## 2023-02-08 RX ORDER — CYCLOBENZAPRINE HYDROCHLORIDE 10 MG/1
1 TABLET, FILM COATED ORAL
Qty: 0 | Refills: 0 | DISCHARGE

## 2023-02-08 RX ORDER — OXYCODONE HYDROCHLORIDE 5 MG/1
5 TABLET ORAL EVERY 4 HOURS
Refills: 0 | Status: DISCONTINUED | OUTPATIENT
Start: 2023-02-08 | End: 2023-02-08

## 2023-02-08 RX ORDER — FENTANYL CITRATE 50 UG/ML
25 INJECTION INTRAVENOUS
Refills: 0 | Status: DISCONTINUED | OUTPATIENT
Start: 2023-02-08 | End: 2023-02-08

## 2023-02-08 RX ORDER — ACETAMINOPHEN 500 MG
1000 TABLET ORAL EVERY 6 HOURS
Refills: 0 | Status: DISCONTINUED | OUTPATIENT
Start: 2023-02-08 | End: 2023-02-22

## 2023-02-08 RX ORDER — TRAMADOL HYDROCHLORIDE 50 MG/1
50 TABLET ORAL ONCE
Refills: 0 | Status: DISCONTINUED | OUTPATIENT
Start: 2023-02-08 | End: 2023-02-08

## 2023-02-08 RX ORDER — CELECOXIB 200 MG/1
200 CAPSULE ORAL ONCE
Refills: 0 | Status: COMPLETED | OUTPATIENT
Start: 2023-02-08 | End: 2023-02-08

## 2023-02-08 RX ORDER — LINACLOTIDE 145 UG/1
1 CAPSULE, GELATIN COATED ORAL
Qty: 0 | Refills: 0 | DISCHARGE

## 2023-02-08 RX ORDER — EZETIMIBE 10 MG/1
1 TABLET ORAL
Qty: 0 | Refills: 0 | DISCHARGE

## 2023-02-08 RX ADMIN — TRAMADOL HYDROCHLORIDE 50 MILLIGRAM(S): 50 TABLET ORAL at 17:59

## 2023-02-08 RX ADMIN — CELECOXIB 200 MILLIGRAM(S): 200 CAPSULE ORAL at 17:52

## 2023-02-08 NOTE — PHYSICAL THERAPY INITIAL EVALUATION ADULT - PERTINENT HX OF CURRENT PROBLEM, REHAB EVAL
s/p spine surgery with Dr. Manning.  Cleared for evaluation Scheduled for spine surgery with Dr. Manning.  Cleared for evaluation preoperatively by Jeevan PITTMAN.  Per conversation with Dr. Manning at 8:20PM. Patient still in OR. Does not need follow up this evening. Scheduled for spine surgery with Dr. Manning.  Cleared for evaluation preoperatively by Jeevan PITTMAN.  Evaluation initiated pre-operatively, completed post-operatively. ZAIN Chew aware

## 2023-02-08 NOTE — ASU PREOP CHECKLIST - SELECT TESTS ORDERED
BMP/CBC/PT/PTT/Type and Screen/EKG/Results in MD note/COVID-19 T and s sent/BMP/CBC/PT/PTT/Type and Screen/EKG/Results in MD note/COVID-19

## 2023-02-08 NOTE — BRIEF OPERATIVE NOTE - NSICDXBRIEFPOSTOP_GEN_ALL_CORE_FT
POST-OP DIAGNOSIS:  Compression fracture of lumbar vertebra 08-Feb-2023 19:21:54  Cristopher Ochoa

## 2023-02-08 NOTE — ASU DISCHARGE PLAN (ADULT/PEDIATRIC) - CARE PROVIDER_API CALL
Jayro Manning)  [facility]  [address] [place] [address], [place] [address]  Phone: [phone]  Fax: [phone]  Follow Up Time:

## 2023-02-08 NOTE — ASU DISCHARGE PLAN (ADULT/PEDIATRIC) - ASU DC SPECIAL INSTRUCTIONSFT
Keep wound/bandage clean, dry and intact. Return to ER if Fever of 101 F or greater develops     Follow up with Dr Jayro Manning at [phone]

## 2023-02-08 NOTE — ASU PREOP CHECKLIST - 1.
pt has rash to upper back and front of her body, Dr Nolan anesthesiologist notified. pt stated started having that after CHG showers. pt was seen by dr nolan, examined. Dr Nolan is going to discuss the case with dr Manning, will await decission.pt and pt's spouse aware of plan of care.

## 2023-02-09 RX ORDER — OXYCODONE HYDROCHLORIDE 5 MG/1
1 TABLET ORAL
Qty: 28 | Refills: 0
Start: 2023-02-09 | End: 2023-02-15

## 2023-02-16 ENCOUNTER — APPOINTMENT (OUTPATIENT)
Dept: ORTHOPEDIC SURGERY | Facility: CLINIC | Age: 68
End: 2023-02-16
Payer: MEDICARE

## 2023-02-16 DIAGNOSIS — S32.010A WEDGE COMPRESSION FRACTURE OF FIRST LUMBAR VERTEBRA, INITIAL ENCOUNTER FOR CLOSED FRACTURE: ICD-10-CM

## 2023-02-16 DIAGNOSIS — M46.1 SACROILIITIS, NOT ELSEWHERE CLASSIFIED: ICD-10-CM

## 2023-02-16 PROCEDURE — 72100 X-RAY EXAM L-S SPINE 2/3 VWS: CPT

## 2023-02-16 PROCEDURE — 99024 POSTOP FOLLOW-UP VISIT: CPT

## 2023-02-16 NOTE — HISTORY OF PRESENT ILLNESS
[Lower back] : lower back [Dull/Aching] : dull/aching [Localized] : localized [Meds] : meds [de-identified] : 2/8/23 L1 kypho\par ----------------------------------------\par \par 12/8/22- 68 y/o M presents for midline lower back pain since picking up his dog 3-4 days ago. Denies pain/N/T in BLEs. Aggravated by standing up from seated and by lifting. Has tried Aleve and topical patches, with minimal relief. Denies prior back surgeries/physical therapy. Denies b/b dysfunction. \par 1/19/23- PT somewhat helpful. No pain distally to the legs. Pain primarily in the LB on the ride side to flank and suprerior glute\par 2/16/23- overall back ache much improved. Having some L sided back pain localized to the L SIJ  [] : no [FreeTextEntry5] : KAL 67 year old M here for PO # 1, reports no radiating pain down his RT side\par 5 days ago pt noticed a constant aching on his LT side , notices the pain when he first wakes up \par he takes Acetaminophen for temporary relief  [FreeTextEntry9] : Patches  [de-identified] : turning side to side

## 2023-02-16 NOTE — IMAGING
[de-identified] : LSPINE\par Inspection: No sign of infection\par Palpation: L SIJ TTP\par ROM: limited forward flexion\par Strength: 5/5 bilateral hip flexors, knee extensors, ankle dorsiflexors, EHL, ankle plantarflexors\par Sensation: Sensation present to light touch bilateral L2-S1 distributions\par Provocative maneuvers: Negative bilateral straight leg raise\par \par Bilateral hips-\par Palpation: No tenderness to palpation over greater trochanter or IT band\par ROM: No pain with flexion and internal rotation\par  [Facet arthropathy] : Facet arthropathy [Disc space narrowing] : Disc space narrowing [Implants in position] : Implants in position [FreeTextEntry1] : mild DDD

## 2023-02-20 ENCOUNTER — RX RENEWAL (OUTPATIENT)
Age: 68
End: 2023-02-20

## 2023-03-30 ENCOUNTER — APPOINTMENT (OUTPATIENT)
Dept: ORTHOPEDIC SURGERY | Facility: CLINIC | Age: 68
End: 2023-03-30

## 2023-05-09 ENCOUNTER — LABORATORY RESULT (OUTPATIENT)
Age: 68
End: 2023-05-09

## 2023-05-09 ENCOUNTER — APPOINTMENT (OUTPATIENT)
Dept: CARDIOLOGY | Facility: CLINIC | Age: 68
End: 2023-05-09
Payer: MEDICARE

## 2023-05-09 VITALS
RESPIRATION RATE: 16 BRPM | WEIGHT: 130 LBS | HEART RATE: 70 BPM | SYSTOLIC BLOOD PRESSURE: 122 MMHG | OXYGEN SATURATION: 99 % | HEIGHT: 68 IN | DIASTOLIC BLOOD PRESSURE: 72 MMHG | BODY MASS INDEX: 19.7 KG/M2 | TEMPERATURE: 98 F

## 2023-05-09 PROBLEM — K58.9 IRRITABLE BOWEL SYNDROME, UNSPECIFIED: Chronic | Status: ACTIVE | Noted: 2023-02-01

## 2023-05-09 PROBLEM — N40.0 BENIGN PROSTATIC HYPERPLASIA WITHOUT LOWER URINARY TRACT SYMPTOMS: Chronic | Status: ACTIVE | Noted: 2023-02-01

## 2023-05-09 PROBLEM — K58.9 IRRITABLE BOWEL SYNDROME WITHOUT DIARRHEA: Chronic | Status: ACTIVE | Noted: 2023-02-01

## 2023-05-09 PROBLEM — E78.5 HYPERLIPIDEMIA, UNSPECIFIED: Chronic | Status: ACTIVE | Noted: 2023-02-01

## 2023-05-09 PROBLEM — S32.010A WEDGE COMPRESSION FRACTURE OF FIRST LUMBAR VERTEBRA, INITIAL ENCOUNTER FOR CLOSED FRACTURE: Chronic | Status: ACTIVE | Noted: 2023-02-01

## 2023-05-09 PROBLEM — F17.200 NICOTINE DEPENDENCE, UNSPECIFIED, UNCOMPLICATED: Chronic | Status: ACTIVE | Noted: 2023-02-01

## 2023-05-09 PROCEDURE — 93880 EXTRACRANIAL BILAT STUDY: CPT

## 2023-05-09 PROCEDURE — 99214 OFFICE O/P EST MOD 30 MIN: CPT | Mod: 25

## 2023-05-09 PROCEDURE — 93306 TTE W/DOPPLER COMPLETE: CPT

## 2023-05-09 NOTE — ASSESSMENT
[FreeTextEntry1] : This is a 67 year year old male here today for follow up cardiac evaluation. \par He has a past medical history significant for hyperlipidemia, status post cholecystectomy, coronary artery calcification.\par \par Today he is feeling generally well and does not have any complaints at this time.  \par \par He is here s/p for back surgery, kyphoplasty to his L1 done on February 8, 2023 by Dr. Manning at [facility].\par \par He is currently on ASA 81mg PO DAILY, Zetia 10mg  PO Daily and Rosuvastatin 40mg PO DAILY.  \par \par He wants to make a note that he is following up with his urologist in regards to his BPH, Dr. Johnston. 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 \par -He states that he has been helping his wife who was recently dx with breast Ca and is going through treatment at this time. \par \par -Pt is stable from a cardiac standpoint and does not have any complaints at this time. \par \par -Cardiac risk factors include HLD and hx of smoker.\par \par BLOOD PRESSURE:\par -BP is well controlled in today's visit.\par \par BLOOD WORK:\par -New blood work was done 05/09/2023  to evaluate lipid profile, CBC, BMP, hepatic function, A1C and TSH. \par -New blood work was done 11/29/2022 which demonstrated normal lipid profile.  Her A1c elevated at 5.9%\par  -Blood work was done May 10, 2022 demonstrated normal lipid profile.\par -Blood work from 10/27/22 demonstrated Cholesterol of 134 and LDL of 78.\par -Blood work was done 05/25/2021 demonstrated normal lipid profile. \par -Pt currently taking Rosuvastatin 40mg PO DAILY and on Zetia 10mg  PO Daily.\par \par TESTING/REPORTS:\par -An echocardiogram was done in the office on 5/9/2023 and results are pending.\par \par -Carotid Doppler done in the office 5/9/2023 and results are pending.\par \par -EKG done 02/01/2023 which demonstrated regular sinus rhythm with nonspecific ST-T wave changes BPM of 74.\par \par -EKG done 11/29/2022 which demonstrated regular sinus rhythm with nonspecific ST-T wave changes BPM of 78.\par \par -The patient had normal exercise stress test August 16, 2022\par \par -EKG done 05/10/2022 which demonstrated regular sinus rhythm with nonspecific ST-T wave changes BPM of 66. \par \par -Electrocardiogram done 5/25/2021 which demonstrated normal sinus rhythm rate 84 beats per minute otherwise remarkable for left axis deviation and left atrial abnormality.\par \par -The patient at chest CAT scan in 2017 which demonstrated coronary artery calcification. A new CAT scan done January 15, 2019 confirm the presence of coronary atherosclerosis. There was no detailed definition regarding extent the location of the coronary calcification.\par \par -The patient had a nuclear stress test done by his prior cardiologist April 30, 2018 which demonstrated normal coronary perfusion with an estimated ejection fraction 61% and normal wall motion.\par \par -The patient had a coronary calcium score done February 26, 2019 which demonstrated a total coronary calcium level of 129 units, all in the left anterior descending artery.\par \par -Echocardiogram done 3/5/2019 demonstrated mild mitral tricuspid and pulmonic regurgitation with normal left ventricular systolic function. EF 63%.\par \par -Echocardiogram done on 10/19/2021 demonstrated thickened mitral valve, mild MR, thickened aortic valve, mild TR with normal left ventricular systolic function. EF of 65%.\par \par PLAN:\par -The patient is clinically stable from a cardiac standpoint on today's exam. \par -He will continue with his current medications and will contact the office if he is having any complaints between now and their next follow up appointment.\par \par I have discussed the plan of care with Mr. KAL COWAN  and he  will follow up in 4-6 months. He is compliant with all of his medications.\par \par The patient understands that aerobic exercises must be increased to minutes 4 times/week and a detailed discussion of lifestyle modification was done today. \par The patient has a good understanding of the diagnosis, treatment plan and lifestyle modification. \par He will contact me at the office for any questions with their care or any changes in their health status.\par \par Mark CABRAL

## 2023-05-09 NOTE — CARDIOLOGY SUMMARY
[___] : [unfilled] [de-identified] : 5/10/22 [de-identified] : NUC 4/30/2018 [de-identified] : 3/5/2019 [de-identified] : CAROTID DOPPLER: 3/5/2019\par NURYS: 3/5/2019

## 2023-05-10 RX ORDER — MELOXICAM 7.5 MG/1
7.5 TABLET ORAL DAILY
Qty: 30 | Refills: 0 | Status: COMPLETED | COMMUNITY
Start: 2023-01-20 | End: 2023-05-10

## 2023-05-16 ENCOUNTER — TRANSCRIPTION ENCOUNTER (OUTPATIENT)
Age: 68
End: 2023-05-16

## 2023-05-16 ENCOUNTER — APPOINTMENT (OUTPATIENT)
Dept: PULMONOLOGY | Facility: CLINIC | Age: 68
End: 2023-05-16
Payer: MEDICARE

## 2023-05-16 VITALS
HEART RATE: 82 BPM | DIASTOLIC BLOOD PRESSURE: 66 MMHG | OXYGEN SATURATION: 97 % | WEIGHT: 130 LBS | HEIGHT: 68 IN | SYSTOLIC BLOOD PRESSURE: 124 MMHG | BODY MASS INDEX: 19.7 KG/M2 | TEMPERATURE: 97.7 F

## 2023-05-16 PROCEDURE — 99406 BEHAV CHNG SMOKING 3-10 MIN: CPT | Mod: 25

## 2023-05-16 PROCEDURE — 99214 OFFICE O/P EST MOD 30 MIN: CPT | Mod: 25

## 2023-05-16 PROCEDURE — 94010 BREATHING CAPACITY TEST: CPT

## 2023-05-16 PROCEDURE — 95012 NITRIC OXIDE EXP GAS DETER: CPT

## 2023-05-16 NOTE — DISCUSSION/SUMMARY
[FreeTextEntry1] : COPD with bronchiectasis\par Active tobacco use\par PREDM\par per ENT LPR- pending EGD\par Continue to advised on tobacco cessation\par he remain on Spiriva Asmanex as ordered- OFF\par PRN Pro Air follow-up 3 months\par Prevnair  13 April 2021\par Pneumonia 13/23 Patient states Up To Date\par Flu vaccine up to date 2021\par Shingles x 2 completed\par T DAP  2021\par \par 3-7 minute discussion with patient about smoking cessation was initiated with patient showing interest in attempting to quit smoking. Problems with risks of continued tobacco smoking including respiratory, cardiovascular, and oncological problems were discussed. Advice on smoking cessation was reiterated including methods of quitting, setting a date to quit, and different medicines and support systems available for smoking cessation was discussed. Addressed  options including nicotine patches gums lozenges, Wellbutrin, Chantix as well as smoking cessation program.\par f/u  3 months\par noted discussed use of controller therapy\par Follow-up PFT\par December 2023 low-dose CAT scan screening follow-up protocol\par Based on tobacco history and risk factors patient does qualify for low-dose CAT scan screening protocol.  Risks benefits of low-dose screening protocol discussed in detail with patient.\par All questions answered at today's visit.\par \par Cardiology f/u\par Completed MODERNA + booster x 2 with  variant booster\par HD Flu 9/13/22\par states pneumonia  vaccine with PMD

## 2023-05-16 NOTE — PROCEDURE
[FreeTextEntry1] : VALDO 5/16/23\par  variable  loops ratio 63  OAD\par NIOX  14 ppb 5/16/23 normal  range\par \par PFT 1/10/23\par  Well preserved flow  rates\par  Mild OAD\par Lung Volumes nl\par  DLCO 63% mild loss fx  alveolar \par IMP\par copd\par \par EXAM: [id number] - CT LDCT LUNG CA SCREENING - ORDERED BY: JIM WALLIS\par \par PROCEDURE DATE: 12/13/2022\par INTERPRETATION: INDICATION: >20 pack year history of smoking. Individual is Current smoker. Lung cancer screening.\par TECHNIQUE: Low dose CT scan of the chest was obtained without intravenous contrast.\par COMPARISON: October 4, 2021\par Coronary artery calcification: Moderate\par Mediastinal and/heart/vessels: Thyroid gland is unremarkable. Aorta and pulmonary arteries are normal in size. No pericardial effusion. No lymphadenopathy.\par Airways/lung/pleura: Right lower lobe 3 mm nodule on series 2 image 106 is unchanged. Previously mentioned right upper lobe nodule not seen clearly on the present examination. Biapical pleural-parenchymal scarring is noted.\par Visible abdomen: Unremarkable noncontrast appearance.\par Soft tissue/bones: No suspicious osseous lesions.\par Impression:\par Stable right lower lobe nodule as above. Previously described right upper lobe nodules not clearly identified.\par Lung-RADS Category 2: Benign Appearance or Behavior. Nodules with a very low likelihood of becoming a clinically active cancer due to size or lack of growth. Continue annual screening with low-dose CT in 12 months.\par \par \par \par \par \par \par \par CAROLANN BAGLEY MD; Attending Radiologist\par This document has been electronically signed. Dec 16 2022 12:37PM\par \par \par Provider Note\par CT chest 12/13/2022 lung RADS category 2\par Right lower lobe 3 mm pulmonary nodule\par Changed\par Previously noted right upper lobe nodule not seen on present exam\par Biapical pleural-parenchymal scar\par 1 year follow-up low-dose protocol\par Patient informed\par \par PFT 9/13/22\par Well preserved flow rates\par Lung Volumes normal\par DLCO 63 % with mild loss fx  alveolar capillary units\par HGB13.1\par  HGB 15.2\par pos decline at DLCO\par \par Chest x-ray PA lateral\par Normal cardiac size\par Prominence of the right fissure\par No dominant pulmonary nodules\par Note pleural effusions pneumothorax\par Positive hyperaeration\par Impression consistent with known COPD\par \par PFT May 27, 2022\par Well-preserved flow rates\par Mild obstructive ventilatory impairment\par Positive bronchodilator response at small airways\par Lung volumes suggest air trapping with increased % predicted\par Specific conductance and resistance normal range\par Diffusion normal range 75% predicted\par Data comparison January 11, 2022 demonstrates interval improvement at the total lung capacity otherwise overall stable pulmonary physiology\par \par Pulmonary 6-minute walk exercise study May 27, 2022\par Baseline room air O2 saturation 98%\par Impression normal study\par No room air desaturation\par NIOX 7 normal range \par \par Laboratory data ordered by primary care physician 5/11/22\par Reviewed\par Normal renal function normal electrolytes\par Liver function testing normal\par Uric acid 3.7\par Magnesium 2.2 cholesterol 121 hemoglobin A1c 5.8\par TSH 1.66 \par \par PFT 1/11/22\par Well preserved flow rates\par Mild OAD\par Lung Volumes  nl range\par  Resistance nl\par Sp Conductance decreased\par Low nl DLCO 74 % pred\par HGB 15.1\par NIOX 11 ppb\par \par CT low-dose screening f/u Oct 2022\par October 4, 2021\par Lung RADS 2\par Stable few small pulmonary nodules findings\par Mild secretions in the trachea\par 1 year follow-up recommended\par \par PFT 7/27/21\par Well preserved flow rates\par  Mild OAD\par  Lung Volumes nl\par  Low nl DLCL 73 % \par  HGB 13.0\par  stable pulmonary physiology\par NIOX 11 ppb  nl range\par \par X-ray PA lateral September 27, 2021\par Cardiac size normal\par Grossly clear lung fields\par No appreciable dominant pulmonary nodules\par Maritza mediastinum unremarkable\par Soft tissue bony structures unremarkable\par No pneumothorax\par Increased retrosternal airspace with flattening of the diaphragms consistent with hyper aeration\par Impression COPD\par \par NIOC  10 ppb  4/27/21 NL\par PFT 4/27 21\par Mild OAD\par  Lung Volumes nl\par DLCO 68 % with minimal loss fx  alveolar  capillary units\par  HGB 13.0\par \par Chest CT scan low-dose protocol September 3, 2020 Baseline study\par Did compared to studies of January 15, 2019 January 15, 2017\par Left upper lobe pulmonary nodule 3 mm no change\par Mild tracheobronchial secretions\par Adenopathy negative\par Impression lung RADS 2\par Annual screening low-dose chest CT 1 year\par \par NIOX 8  ppb August 20, 2020\par PFT without bronchodilator August 20, 2020\par Well-preserved flow rates mild obstructive pattern\par Normal lung volumes with borderline air trapping\par Specific conductance and resistance normal\par Reduction diffusion 65% of predicted with a loss of functioning alveolocapillary units\par Hemoglobin 14.9\par \par Pulmonary 6-minute walk stress test August 20, 2020\par Baseline room air O2 saturation 98%\par No desaturation\par Impression normal study\par \par Chest x-ray PA lateral projection January 3, 2019\par Cardiac size normal\par Hyper aeration\par Peribronchial cuffing\par No dominant pulmonary nodules parenchymal infiltrates pleural effusions\par Mediastinum normal\par Increased retrosternal airspace consistent with air-trapping\par Impression COPD\par \par PCV 23 1/2020\par \par Echocardiogram study completed October 19, 2021\par Ejection fraction estimate 65%\par Minimal mitral regurgitation\par Thickened aortic valve\par Mild tricuspid regurgitation\par No pulmonary hypertension\par \par Shringrx up to date\par Check re Prevnair COMPLETED 20 \par HD Flu 9/13/22\par

## 2023-05-16 NOTE — PHYSICAL EXAM
[General Appearance - Well Developed] : well developed [Normal Appearance] : normal appearance [Well Groomed] : well groomed [General Appearance - Well Nourished] : well nourished [No Deformities] : no deformities [General Appearance - In No Acute Distress] : no acute distress [Normal Conjunctiva] : the conjunctiva exhibited no abnormalities [Eyelids - No Xanthelasma] : the eyelids demonstrated no xanthelasmas [Normal Oropharynx] : normal oropharynx [I] : I [Neck Appearance] : the appearance of the neck was normal [Neck Cervical Mass (___cm)] : no neck mass was observed [Jugular Venous Distention Increased] : there was no jugular-venous distention [Thyroid Diffuse Enlargement] : the thyroid was not enlarged [Thyroid Nodule] : there were no palpable thyroid nodules [Heart Rate And Rhythm] : heart rate and rhythm were normal [Heart Sounds] : normal S1 and S2 [Murmurs] : no murmurs present [Arterial Pulses Normal] : the arterial pulses were normal [Edema] : no peripheral edema present [Veins - Varicosity Changes] : no varicosital changes were noted in the lower extremities [Respiration, Rhythm And Depth] : normal respiratory rhythm and effort [Exaggerated Use Of Accessory Muscles For Inspiration] : no accessory muscle use [Auscultation Breath Sounds / Voice Sounds] : lungs were clear to auscultation bilaterally [Lungs Percussion] : the lungs were normal to percussion [Chest Palpation] : palpation of the chest revealed no abnormalities [Bowel Sounds] : normal bowel sounds [Abdomen Soft] : soft [Abdomen Tenderness] : non-tender [Abdomen Mass (___ Cm)] : no abdominal mass palpated [Abnormal Walk] : normal gait [Gait - Sufficient For Exercise Testing] : the gait was sufficient for exercise testing [Nail Clubbing] : no clubbing of the fingernails [Cyanosis, Localized] : no localized cyanosis [Petechial Hemorrhages (___cm)] : no petechial hemorrhages [Skin Color & Pigmentation] : normal skin color and pigmentation [] : no rash [No Venous Stasis] : no venous stasis [Skin Lesions] : no skin lesions [No Skin Ulcers] : no skin ulcer [No Xanthoma] : no  xanthoma was observed [Deep Tendon Reflexes (DTR)] : deep tendon reflexes were 2+ and symmetric [Sensation] : the sensory exam was normal to light touch and pinprick [Motor Exam] : the motor exam was normal [No Focal Deficits] : no focal deficits [Oriented To Time, Place, And Person] : oriented to person, place, and time [Impaired Insight] : insight and judgment were intact [Affect] : the affect was normal

## 2023-05-16 NOTE — HISTORY OF PRESENT ILLNESS
[Current] : current [>= 20 pack years] : >= 20 pack years [Stable] : are stable [None] : ~He/She~ has no significant interval events [Difficulty Breathing During Exertion] : stable dyspnea on exertion [Feelings Of Weakness On Exertion] : stable exercise intolerance [Cough] : denies coughing [Wheezing] : denies wheezing [Regional Soft Tissue Swelling Both Lower Extremities] : denies lower extremity edema [Chest Pain Or Discomfort] : denies chest pain [Fever] : denies fever [Date: ___] : was performed [unfilled] [Wt Gain ___ Lbs] : no recent weight gain [Wt Loss ___ Lbs] : no recent weight loss [Oxygen] : the patient uses no supplemental oxygen [de-identified] : subcm pulmonary nodules/ apical scar [FreeTextEntry1] : ENT laryngeal reflux with pending EGD\par \par side  effects with Chantix  Did  get Nicotine  patch which has  helped from 1 ppd to  1/2  ppd over past  3  weeks\par in past did use Wellbutrin with some mild success but now actively smoking 1/2 ppd\par No Active respiratory symptoms but notes some inc dyspnea primarily stairs\par Continues to smoke pos 6-7 cigs per day\par Addressed all questions regarding immunization profile\par No fevers chills or sweats\par No lower extremity edema\par Denies chest pain chest tightness wheezing sputum production or hemoptysis\par \par

## 2023-10-10 ENCOUNTER — APPOINTMENT (OUTPATIENT)
Dept: PULMONOLOGY | Facility: CLINIC | Age: 68
End: 2023-10-10
Payer: MEDICARE

## 2023-10-10 VITALS
HEART RATE: 88 BPM | WEIGHT: 130 LBS | OXYGEN SATURATION: 95 % | DIASTOLIC BLOOD PRESSURE: 72 MMHG | SYSTOLIC BLOOD PRESSURE: 120 MMHG | HEIGHT: 68 IN | BODY MASS INDEX: 19.7 KG/M2 | TEMPERATURE: 98.1 F

## 2023-10-10 DIAGNOSIS — R09.82 POSTNASAL DRIP: ICD-10-CM

## 2023-10-10 DIAGNOSIS — R91.8 OTHER NONSPECIFIC ABNORMAL FINDING OF LUNG FIELD: ICD-10-CM

## 2023-10-10 PROCEDURE — 99406 BEHAV CHNG SMOKING 3-10 MIN: CPT | Mod: 59

## 2023-10-10 PROCEDURE — 99214 OFFICE O/P EST MOD 30 MIN: CPT | Mod: 25

## 2023-10-10 PROCEDURE — 94010 BREATHING CAPACITY TEST: CPT

## 2023-10-10 PROCEDURE — 71046 X-RAY EXAM CHEST 2 VIEWS: CPT

## 2023-10-10 PROCEDURE — 95012 NITRIC OXIDE EXP GAS DETER: CPT

## 2023-10-17 ENCOUNTER — APPOINTMENT (OUTPATIENT)
Dept: PULMONOLOGY | Facility: CLINIC | Age: 68
End: 2023-10-17
Payer: MEDICARE

## 2023-10-17 VITALS
HEIGHT: 68 IN | DIASTOLIC BLOOD PRESSURE: 69 MMHG | HEART RATE: 89 BPM | SYSTOLIC BLOOD PRESSURE: 119 MMHG | TEMPERATURE: 97.9 F | WEIGHT: 130 LBS | OXYGEN SATURATION: 97 % | BODY MASS INDEX: 19.7 KG/M2

## 2023-10-17 DIAGNOSIS — J44.1 CHRONIC OBSTRUCTIVE PULMONARY DISEASE WITH (ACUTE) EXACERBATION: ICD-10-CM

## 2023-10-17 PROCEDURE — 99214 OFFICE O/P EST MOD 30 MIN: CPT

## 2023-10-17 RX ORDER — AZITHROMYCIN 250 MG/1
250 TABLET, FILM COATED ORAL
Qty: 1 | Refills: 0 | Status: DISCONTINUED | COMMUNITY
Start: 2023-10-10 | End: 2023-10-17

## 2023-10-17 RX ORDER — METHYLPREDNISOLONE 4 MG/1
4 TABLET ORAL
Qty: 1 | Refills: 0 | Status: DISCONTINUED | COMMUNITY
Start: 2023-10-10 | End: 2023-10-17

## 2023-10-17 RX ORDER — IPRATROPIUM BROMIDE AND ALBUTEROL SULFATE 2.5; .5 MG/3ML; MG/3ML
0.5-2.5 (3) SOLUTION RESPIRATORY (INHALATION)
Qty: 1 | Refills: 5 | Status: ACTIVE | COMMUNITY
Start: 2023-10-14 | End: 1900-01-01

## 2023-10-17 RX ORDER — CEFUROXIME AXETIL 500 MG/1
500 TABLET ORAL
Qty: 14 | Refills: 0 | Status: ACTIVE | COMMUNITY
Start: 2023-10-17 | End: 1900-01-01

## 2023-10-18 ENCOUNTER — APPOINTMENT (OUTPATIENT)
Dept: CARDIOLOGY | Facility: CLINIC | Age: 68
End: 2023-10-18

## 2023-10-31 ENCOUNTER — APPOINTMENT (OUTPATIENT)
Dept: PULMONOLOGY | Facility: CLINIC | Age: 68
End: 2023-10-31
Payer: MEDICARE

## 2023-10-31 VITALS
HEART RATE: 81 BPM | BODY MASS INDEX: 19.7 KG/M2 | DIASTOLIC BLOOD PRESSURE: 67 MMHG | HEIGHT: 68 IN | TEMPERATURE: 98.2 F | OXYGEN SATURATION: 98 % | WEIGHT: 130 LBS | SYSTOLIC BLOOD PRESSURE: 121 MMHG

## 2023-10-31 DIAGNOSIS — R06.09 OTHER FORMS OF DYSPNEA: ICD-10-CM

## 2023-10-31 DIAGNOSIS — Z72.0 TOBACCO USE: ICD-10-CM

## 2023-10-31 LAB — HEMOGLOBIN: 14.6

## 2023-10-31 PROCEDURE — 85018 HEMOGLOBIN: CPT | Mod: QW

## 2023-10-31 PROCEDURE — 94060 EVALUATION OF WHEEZING: CPT

## 2023-10-31 PROCEDURE — 94727 GAS DIL/WSHOT DETER LNG VOL: CPT

## 2023-10-31 PROCEDURE — 94729 DIFFUSING CAPACITY: CPT

## 2023-10-31 PROCEDURE — 99214 OFFICE O/P EST MOD 30 MIN: CPT | Mod: 25

## 2023-10-31 PROCEDURE — ZZZZZ: CPT

## 2023-11-27 ENCOUNTER — APPOINTMENT (OUTPATIENT)
Dept: CARDIOLOGY | Facility: CLINIC | Age: 68
End: 2023-11-27

## 2023-12-05 ENCOUNTER — APPOINTMENT (OUTPATIENT)
Dept: PULMONOLOGY | Facility: CLINIC | Age: 68
End: 2023-12-05
Payer: MEDICARE

## 2023-12-05 VITALS — HEART RATE: 74 BPM | DIASTOLIC BLOOD PRESSURE: 70 MMHG | OXYGEN SATURATION: 99 % | SYSTOLIC BLOOD PRESSURE: 110 MMHG

## 2023-12-05 DIAGNOSIS — J47.9 BRONCHIECTASIS, UNCOMPLICATED: ICD-10-CM

## 2023-12-05 DIAGNOSIS — F17.200 NICOTINE DEPENDENCE, UNSPECIFIED, UNCOMPLICATED: ICD-10-CM

## 2023-12-05 PROCEDURE — 99406 BEHAV CHNG SMOKING 3-10 MIN: CPT | Mod: 25

## 2023-12-05 PROCEDURE — G0296 VISIT TO DETERM LDCT ELIG: CPT

## 2023-12-05 PROCEDURE — 94010 BREATHING CAPACITY TEST: CPT

## 2023-12-05 PROCEDURE — 95012 NITRIC OXIDE EXP GAS DETER: CPT

## 2023-12-05 PROCEDURE — 99214 OFFICE O/P EST MOD 30 MIN: CPT | Mod: 25

## 2023-12-05 RX ORDER — BUDESONIDE, GLYCOPYRROLATE, AND FORMOTEROL FUMARATE 160; 9; 4.8 UG/1; UG/1; UG/1
160-9-4.8 AEROSOL, METERED RESPIRATORY (INHALATION)
Qty: 1 | Refills: 3 | Status: DISCONTINUED | COMMUNITY
Start: 2023-10-31 | End: 2023-12-05

## 2023-12-11 NOTE — H&P PST ADULT - LAST STRESS TEST
Medication: Levothyroxine  Last office visit date: 07/17/2023  Next appointment scheduled?: Yes   Number of refills given: 1      Name from pharmacy: LEVOTHYROXINE 0.025MG (25MCG) TAB          Will file in chart as: levothyroxine 25 MCG tablet     Possible duplicate: Hover to review recent actions on this medication    Sig: Take 1 tablet by mouth daily.    Original sig: TAKE 1 TABLET BY MOUTH DAILY    Disp: 30 tablet    Refills: 1 (Pharmacy requested: Not specified)    Start: 12/9/2023    Class: Eprescribe    Last ordered: 1 month ago (10/19/2023) by María Elena Ellis MD    Last refill: 11/14/2023    Rx #: [id number]    Thyroid Hormones Refill Protocol - 12 Month Protocol [id number]/09/2023 07:44 AM   Protocol Details Seen by prescribing provider or same department within the last 12 months or has a future appt in 3 months - IF FAILED PLEASE LOOK AT CHART REVIEW FOR LAST VISIT AND PROCEED ACCORDINGLY    Patient is not pregnant    Normal TSH within last 12 months looking at last value - IF FAILED REFER TO PROTOCOL DETAILS    Medication (including dose and sig) on current meds list         no

## 2024-01-03 DIAGNOSIS — U07.1 COVID-19: ICD-10-CM

## 2024-01-09 ENCOUNTER — RX RENEWAL (OUTPATIENT)
Age: 69
End: 2024-01-09

## 2024-01-12 RX ORDER — FLUTICASONE PROPIONATE AND SALMETEROL 500; 50 UG/1; UG/1
500-50 POWDER RESPIRATORY (INHALATION)
Qty: 180 | Refills: 3 | Status: ACTIVE | COMMUNITY
Start: 2024-01-12 | End: 1900-01-01

## 2024-01-28 ENCOUNTER — NON-APPOINTMENT (OUTPATIENT)
Age: 69
End: 2024-01-28

## 2024-01-28 VITALS — WEIGHT: 130 LBS | HEIGHT: 68 IN | BODY MASS INDEX: 19.7 KG/M2

## 2024-01-28 NOTE — HISTORY OF PRESENT ILLNESS
[Current] : Current [TextBox_13] : Patient is scheduled for a annual  LDCT for lung cancer screening. Chart review performed to confirm eligibility for LDCT.    No documented personal or family history of lung cancer. No documented s/s of lung cancer. Patient is a current smoker with a 35 pack year hx. [PacksperYear] : 35

## 2024-01-29 ENCOUNTER — APPOINTMENT (OUTPATIENT)
Dept: CT IMAGING | Facility: IMAGING CENTER | Age: 69
End: 2024-01-29
Payer: MEDICARE

## 2024-01-29 ENCOUNTER — OUTPATIENT (OUTPATIENT)
Dept: OUTPATIENT SERVICES | Facility: HOSPITAL | Age: 69
LOS: 1 days | End: 2024-01-29
Payer: MEDICARE

## 2024-01-29 DIAGNOSIS — C44.320 SQUAMOUS CELL CARCINOMA OF SKIN OF UNSPECIFIED PARTS OF FACE: Chronic | ICD-10-CM

## 2024-01-29 DIAGNOSIS — F17.200 NICOTINE DEPENDENCE, UNSPECIFIED, UNCOMPLICATED: ICD-10-CM

## 2024-01-29 DIAGNOSIS — Z90.49 ACQUIRED ABSENCE OF OTHER SPECIFIED PARTS OF DIGESTIVE TRACT: Chronic | ICD-10-CM

## 2024-01-29 PROCEDURE — 71271 CT THORAX LUNG CANCER SCR C-: CPT

## 2024-01-29 PROCEDURE — 71271 CT THORAX LUNG CANCER SCR C-: CPT | Mod: 26

## 2024-02-06 ENCOUNTER — NON-APPOINTMENT (OUTPATIENT)
Age: 69
End: 2024-02-06

## 2024-04-26 ENCOUNTER — RX RENEWAL (OUTPATIENT)
Age: 69
End: 2024-04-26

## 2024-04-30 ENCOUNTER — APPOINTMENT (OUTPATIENT)
Dept: PULMONOLOGY | Facility: CLINIC | Age: 69
End: 2024-04-30
Payer: MEDICARE

## 2024-04-30 VITALS
BODY MASS INDEX: 18.04 KG/M2 | TEMPERATURE: 97.9 F | HEART RATE: 82 BPM | SYSTOLIC BLOOD PRESSURE: 114 MMHG | WEIGHT: 119 LBS | HEIGHT: 68 IN | DIASTOLIC BLOOD PRESSURE: 55 MMHG | OXYGEN SATURATION: 99 %

## 2024-04-30 DIAGNOSIS — J44.9 CHRONIC OBSTRUCTIVE PULMONARY DISEASE, UNSPECIFIED: ICD-10-CM

## 2024-04-30 LAB — HEMOGLOBIN: 14.2

## 2024-04-30 PROCEDURE — ZZZZZ: CPT

## 2024-04-30 PROCEDURE — 94729 DIFFUSING CAPACITY: CPT

## 2024-04-30 PROCEDURE — 94727 GAS DIL/WSHOT DETER LNG VOL: CPT

## 2024-04-30 PROCEDURE — 99214 OFFICE O/P EST MOD 30 MIN: CPT | Mod: 25

## 2024-04-30 PROCEDURE — 94010 BREATHING CAPACITY TEST: CPT

## 2024-04-30 PROCEDURE — 99406 BEHAV CHNG SMOKING 3-10 MIN: CPT

## 2024-04-30 PROCEDURE — 85018 HEMOGLOBIN: CPT | Mod: QW

## 2024-04-30 NOTE — PROCEDURE
[FreeTextEntry1] : PFT 4/30/24 Well preserved flow  rates WNL  Mild OAD  Lung Volumes nl  No  air trapping  DLCO 65 % with mild loss fx  alveolar  capillary units  HGB 14.2 CT chest low-dose CAT scan screening protocol January 29, 2024  comparedDecember 13, 2022 Subcentimeter small nodules measuring up to 4 mm No new nodules Lung RADS category 2 benign 1 year follow-up screening low-dose CAT scan protocol  NIOX  13  ppb WNL  12/5/23 Spirometry 12/5/2023 Well-preserved normal flow rates Ratio 70 Obstructive pattern mild NIOX 6 normal range   PFT 10/31/23  Well preserved flow  rate  Mild OAD  Lung volumes nl no  air trapping  DLCO 76 %  HGB 14.6 interval improvement overall pulmonary physiology  Spirometry no bronchodilator October 10, 2023 Well-preserved flow rates Mild obstructive ventilatory impairment Compared to May 2023 there is a significant decline at the flow rates  Chest x-ray PA lateral October 10, 2023 Hyperaeration No appreciable dominant pulmonary nodules parenchymal infiltrates or pleural effusions Impression most consistent with COPD  VALDO 5/16/23  variable  loops ratio 63  OAD NIOX  14 ppb 5/16/23 normal  range  PFT 1/10/23  Well preserved flow  rates  Mild OAD Lung Volumes nl  DLCO 63% mild loss fx  alveolar  IMP copd  EXAM: [id number] - CT LDCT LUNG CA SCREENING - ORDERED BY: JIM WALLIS  PROCEDURE DATE: 12/13/2022 INTERPRETATION: INDICATION: >20 pack year history of smoking. Individual is Current smoker. Lung cancer screening. TECHNIQUE: Low dose CT scan of the chest was obtained without intravenous contrast. COMPARISON: October 4, 2021 Coronary artery calcification: Moderate Mediastinal and/heart/vessels: Thyroid gland is unremarkable. Aorta and pulmonary arteries are normal in size. No pericardial effusion. No lymphadenopathy. Airways/lung/pleura: Right lower lobe 3 mm nodule on series 2 image 106 is unchanged. Previously mentioned right upper lobe nodule not seen clearly on the present examination. Biapical pleural-parenchymal scarring is noted. Visible abdomen: Unremarkable noncontrast appearance. Soft tissue/bones: No suspicious osseous lesions. Impression: Stable right lower lobe nodule as above. Previously described right upper lobe nodules not clearly identified. Lung-RADS Category 2: Benign Appearance or Behavior. Nodules with a very low likelihood of becoming a clinically active cancer due to size or lack of growth. Continue annual screening with low-dose CT in 12 months.        CAROLANN BAGLEY MD; Attending Radiologist This document has been electronically signed. Dec 16 2022 12:37PM   Provider Note CT chest 12/13/2022 lung RADS category 2 Right lower lobe 3 mm pulmonary nodule Changed Previously noted right upper lobe nodule not seen on present exam Biapical pleural-parenchymal scar 1 year follow-up low-dose protocol Patient informed  PFT 9/13/22 Well preserved flow rates Lung Volumes normal DLCO 63 % with mild loss fx  alveolar capillary units HGB13.1  HGB 15.2 pos decline at DLCO  Chest x-ray PA lateral Normal cardiac size Prominence of the right fissure No dominant pulmonary nodules Note pleural effusions pneumothorax Positive hyperaeration Impression consistent with known COPD  PFT May 27, 2022 Well-preserved flow rates Mild obstructive ventilatory impairment Positive bronchodilator response at small airways Lung volumes suggest air trapping with increased % predicted Specific conductance and resistance normal range Diffusion normal range 75% predicted Data comparison January 11, 2022 demonstrates interval improvement at the total lung capacity otherwise overall stable pulmonary physiology  Pulmonary 6-minute walk exercise study May 27, 2022 Baseline room air O2 saturation 98% Impression normal study No room air desaturation NIOX 7 normal range   Laboratory data ordered by primary care physician 5/11/22 Reviewed Normal renal function normal electrolytes Liver function testing normal Uric acid 3.7 Magnesium 2.2 cholesterol 121 hemoglobin A1c 5.8 TSH 1.66   PFT 1/11/22 Well preserved flow rates Mild OAD Lung Volumes  nl range  Resistance nl Sp Conductance decreased Low nl DLCO 74 % pred HGB 15.1 NIOX 11 ppb  CT low-dose screening f/u Oct 2022 October 4, 2021 Lung RADS 2 Stable few small pulmonary nodules findings Mild secretions in the trachea 1 year follow-up recommended  PFT 7/27/21 Well preserved flow rates  Mild OAD  Lung Volumes nl  Low nl DLCL 73 %   HGB 13.0  stable pulmonary physiology NIOX 11 ppb  nl range  X-ray PA lateral September 27, 2021 Cardiac size normal Grossly clear lung fields No appreciable dominant pulmonary nodules Maritza mediastinum unremarkable Soft tissue bony structures unremarkable No pneumothorax Increased retrosternal airspace with flattening of the diaphragms consistent with hyper aeration Impression COPD  NIOC  10 ppb  4/27/21 NL PFT 4/27 21 Mild OAD  Lung Volumes nl DLCO 68 % with minimal loss fx  alveolar  capillary units  HGB 13.0  Chest CT scan low-dose protocol September 3, 2020 Baseline study Did compared to studies of January 15, 2019 January 15, 2017 Left upper lobe pulmonary nodule 3 mm no change Mild tracheobronchial secretions Adenopathy negative Impression lung RADS 2 Annual screening low-dose chest CT 1 year  NIOX 8  ppb August 20, 2020 PFT without bronchodilator August 20, 2020 Well-preserved flow rates mild obstructive pattern Normal lung volumes with borderline air trapping Specific conductance and resistance normal Reduction diffusion 65% of predicted with a loss of functioning alveolocapillary units Hemoglobin 14.9  Pulmonary 6-minute walk stress test August 20, 2020 Baseline room air O2 saturation 98% No desaturation Impression normal study  Chest x-ray PA lateral projection January 3, 2019 Cardiac size normal Hyper aeration Peribronchial cuffing No dominant pulmonary nodules parenchymal infiltrates pleural effusions Mediastinum normal Increased retrosternal airspace consistent with air-trapping Impression COPD  PCV 23 1/2020  Echocardiogram study completed October 19, 2021 Ejection fraction estimate 65% Minimal mitral regurgitation Thickened aortic valve Mild tricuspid regurgitation No pulmonary hypertension  Shringrx up to date Check re Prevnair COMPLETED 20  HD Flu 9/13/22

## 2024-04-30 NOTE — HISTORY OF PRESENT ILLNESS
[Current] : current [>= 20 pack years] : >= 20 pack years [Stable] : are stable [None] : ~He/She~ has no significant interval events [Difficulty Breathing During Exertion] : stable dyspnea on exertion [Feelings Of Weakness On Exertion] : stable exercise intolerance [Cough] : denies coughing [Wheezing] : denies wheezing [Regional Soft Tissue Swelling Both Lower Extremities] : denies lower extremity edema [Chest Pain Or Discomfort] : denies chest pain [Fever] : denies fever [Wt Gain ___ Lbs] : no recent weight gain [Wt Loss ___ Lbs] : no recent weight loss [Oxygen] : the patient uses no supplemental oxygen [Date: ___] : was performed [unfilled] [de-identified] : subcm pulmonary nodules/ apical scar [FreeTextEntry1] :   self d/;d BREZTRI Dry Mouth pos taobacco but dec  pos  chest  congestion better post tx  difficult to  clear secretions  yellow sputum  no  fever chills  sweats has  used HFN with DUONEB  QID   side  effects with Chantix  Did  get Nicotine  patch which has  helped from 1 ppd to  1/2  ppd over past  3  weeks in past did use Wellbutrin with some mild success but now actively smoking 1/2 ppd No Active respiratory symptoms but notes some inc dyspnea primarily stairs Continues to smoke pos 6-7 cigs per day Addressed all questions regarding immunization profile No fevers chills or sweats No lower extremity edema Denies chest pain chest tightness wheezing sputum production or hemoptysis

## 2024-04-30 NOTE — DISCUSSION/SUMMARY
[FreeTextEntry1] : COPD with bronchiectasis with mild Flare- ZPAK + medrol- RETX and  continue  DUONEB QID + ceftin x 7  days + prednisone and then transition to   BREZTRI  or Trelegy at f/u No actrive sxs post Flare Noted Breztri discontinued due to patient's significant dry mouth We started Symbicort 160-4.5 mcg 2 puffs twice daily and instructions to rinse noted sxs cleared Active tobacco use PREDM per ENT LPR- pending EGD Continue to advised on tobacco cessation he remain on Spiriva Asmanex as ordered- OFF PRN Pro Air follow-up 3 months Prevnair  13 April 2021 Pneumonia 13/23 Patient states Up To Date Flu vaccine up to date 2021 Shingles x 2 completed T DAP  2021  3-7 minute discussion with patient about smoking cessation was initiated with patient showing interest in attempting to quit smoking. Problems with risks of continued tobacco smoking including respiratory, cardiovascular, and oncological problems were discussed. Advice on smoking cessation was reiterated including methods of quitting, setting a date to quit, and different medicines and support systems available for smoking cessation was discussed. Addressed  options including nicotine patches gums lozenges, Wellbutrin, Chantix as well as smoking cessation program. f/u  3 months noted discussed use of controller therapy Follow-up PFT December 2023 low-dose CAT scan screening follow-up protocol Based on tobacco history and risk factors patient does qualify for low-dose CAT scan screening protocol.  Risks benefits of low-dose screening protocol discussed in detail with patient. All questions answered at today's visit. NOTED Up to  Date COVID vaccine  and  Flu  Vccien Fall 2023  Cardiology f/u Completed MODERNA + booster x 2 with  variant booster Fall Flu COVIDF shgingles RSV 2023 up to  date states pneumonia  vaccine with PMD and statres Flu  vacine up to   date  REMINDER CT CHEST  April 2025 staters pneumonia  vaccine up to date PCV 20 at Pharm All questions answered at today's visit.

## 2024-05-22 ENCOUNTER — RX RENEWAL (OUTPATIENT)
Age: 69
End: 2024-05-22

## 2024-05-29 LAB
ALBUMIN SERPL ELPH-MCNC: 4.7 G/DL
ALP BLD-CCNC: 48 U/L
ALT SERPL-CCNC: 51 U/L
ANION GAP SERPL CALC-SCNC: 11 MMOL/L
AST SERPL-CCNC: 88 U/L
BILIRUB DIRECT SERPL-MCNC: 0.1 MG/DL
BILIRUB INDIRECT SERPL-MCNC: 0.3 MG/DL
BILIRUB SERPL-MCNC: 0.4 MG/DL
BUN SERPL-MCNC: 12 MG/DL
CALCIUM SERPL-MCNC: 9.8 MG/DL
CHLORIDE SERPL-SCNC: 101 MMOL/L
CHOLEST SERPL-MCNC: 137 MG/DL
CO2 SERPL-SCNC: 25 MMOL/L
CREAT SERPL-MCNC: 0.71 MG/DL
EGFR: 100 ML/MIN/1.73M2
ESTIMATED AVERAGE GLUCOSE: 120 MG/DL
GLUCOSE SERPL-MCNC: 102 MG/DL
HBA1C MFR BLD HPLC: 5.8 %
HDLC SERPL-MCNC: 45 MG/DL
LDLC SERPL CALC-MCNC: 72 MG/DL
LDLC SERPL DIRECT ASSAY-MCNC: 71 MG/DL
MAGNESIUM SERPL-MCNC: 2.1 MG/DL
NONHDLC SERPL-MCNC: 92 MG/DL
POTASSIUM SERPL-SCNC: 4.6 MMOL/L
PROT SERPL-MCNC: 6.9 G/DL
SODIUM SERPL-SCNC: 137 MMOL/L
TRIGL SERPL-MCNC: 111 MG/DL
TSH SERPL-ACNC: 2.9 UIU/ML
URATE SERPL-MCNC: 3.3 MG/DL

## 2024-06-03 ENCOUNTER — APPOINTMENT (OUTPATIENT)
Dept: CARDIOLOGY | Facility: CLINIC | Age: 69
End: 2024-06-03
Payer: MEDICARE

## 2024-06-03 ENCOUNTER — NON-APPOINTMENT (OUTPATIENT)
Age: 69
End: 2024-06-03

## 2024-06-03 VITALS
OXYGEN SATURATION: 97 % | BODY MASS INDEX: 18.38 KG/M2 | HEART RATE: 97 BPM | HEIGHT: 68 IN | WEIGHT: 121.25 LBS | RESPIRATION RATE: 16 BRPM | SYSTOLIC BLOOD PRESSURE: 112 MMHG | TEMPERATURE: 98.9 F | DIASTOLIC BLOOD PRESSURE: 68 MMHG

## 2024-06-03 DIAGNOSIS — Z86.79 PERSONAL HISTORY OF OTHER DISEASES OF THE CIRCULATORY SYSTEM: ICD-10-CM

## 2024-06-03 DIAGNOSIS — I65.29 OCCLUSION AND STENOSIS OF UNSPECIFIED CAROTID ARTERY: ICD-10-CM

## 2024-06-03 DIAGNOSIS — I07.1 RHEUMATIC TRICUSPID INSUFFICIENCY: ICD-10-CM

## 2024-06-03 DIAGNOSIS — F17.200 NICOTINE DEPENDENCE, UNSPECIFIED, UNCOMPLICATED: ICD-10-CM

## 2024-06-03 DIAGNOSIS — I25.10 ATHEROSCLEROTIC HEART DISEASE OF NATIVE CORONARY ARTERY W/OUT ANGINA PECTORIS: ICD-10-CM

## 2024-06-03 DIAGNOSIS — E78.5 HYPERLIPIDEMIA, UNSPECIFIED: ICD-10-CM

## 2024-06-03 DIAGNOSIS — I25.84 ATHEROSCLEROTIC HEART DISEASE OF NATIVE CORONARY ARTERY W/OUT ANGINA PECTORIS: ICD-10-CM

## 2024-06-03 DIAGNOSIS — I34.0 NONRHEUMATIC MITRAL (VALVE) INSUFFICIENCY: ICD-10-CM

## 2024-06-03 PROCEDURE — 93000 ELECTROCARDIOGRAM COMPLETE: CPT

## 2024-06-03 PROCEDURE — 99214 OFFICE O/P EST MOD 30 MIN: CPT

## 2024-06-03 PROCEDURE — G2211 COMPLEX E/M VISIT ADD ON: CPT

## 2024-06-03 RX ORDER — ROSUVASTATIN CALCIUM 40 MG/1
40 TABLET, FILM COATED ORAL
Qty: 90 | Refills: 1 | Status: ACTIVE | COMMUNITY
Start: 2019-03-20 | End: 1900-01-01

## 2024-06-03 RX ORDER — PREDNISONE 10 MG/1
10 TABLET ORAL
Qty: 30 | Refills: 1 | Status: COMPLETED | COMMUNITY
Start: 2023-10-17 | End: 2024-06-03

## 2024-06-03 RX ORDER — BUDESONIDE AND FORMOTEROL FUMARATE DIHYDRATE 160; 4.5 UG/1; UG/1
160-4.5 AEROSOL RESPIRATORY (INHALATION)
Qty: 1 | Refills: 3 | Status: COMPLETED | COMMUNITY
Start: 2023-12-05 | End: 2024-06-03

## 2024-06-03 RX ORDER — NIRMATRELVIR AND RITONAVIR 300-100 MG
20 X 150 MG & KIT ORAL
Qty: 1 | Refills: 0 | Status: COMPLETED | COMMUNITY
Start: 2024-01-03 | End: 2024-06-03

## 2024-06-03 NOTE — REASON FOR VISIT
[CV Risk Factors and Non-Cardiac Disease] : CV risk factors and non-cardiac disease [Follow-Up - Clinic] : a clinic follow-up of [Hyperlipidemia] : hyperlipidemia [FreeTextEntry3] : Dr. Thomas [FreeTextEntry1] : murmur, coronary calcification

## 2024-06-03 NOTE — CARDIOLOGY SUMMARY
[___] : [unfilled] [de-identified] : 5/10/22 [de-identified] : NUC 4/30/2018 [de-identified] : 3/5/2019 [de-identified] : CAROTID DOPPLER: 3/5/2019\par  NURYS: 3/5/2019

## 2024-06-03 NOTE — ASSESSMENT
[FreeTextEntry1] : This is a 69-year-old male here today for follow-up cardiac evaluation.   He has a past medical history significant for hyperlipidemia, status post cholecystectomy, coronary artery calcification.  Cardiac risk factors include HLD and hx of smoker.  HPI: He is feeling generally well today and denies chest pain, dizziness, heart palpitations, recent episodes of syncope or falls, SOB, or dyspnea at this time.  Current Medications: ASA 81mg PO DAILY, Zetia 10 mg PO Daily, and Rosuvastatin 40mg PO DAILY.    He wants to make a note that he is following up with his urologist in regard to his BPH, Dr. Johnston.   He is now a grandpa and watches his granddaughter, Ynes.   He states that he has been helping his wife who was recently dx with breast Ca and is going through treatment at this time.    BLOOD PRESSURE: -BP is well controlled in today's visit.   BLOOD WORK:  *LDL target goal < 70* -Lipid panel done May 2024 demonstrated triglyceride 111, cholesterol 137, HDL 45, LDL 72, non-HDL 92, LDL direct 71 -Lipid panel done May 2023 demonstrated triglycerides 76, cholesterol 118, HDL 37, LDL 69, non-HDL 82, LDL direct 65. -New blood work was done 11/29/2022 which demonstrated normal lipid profile.  Her A1c elevated at 5.9% -Blood work was done May 10, 2022 demonstrated normal lipid profile. -Blood work from 10/27/22 demonstrated Cholesterol of 134 and LDL of 78. -Blood work was done 05/25/2021 demonstrated normal lipid profile.    TESTING/REPORTS: -EKG done 06/03/2024 demonstrated regular sinus rhythm otherwise remarkable for left axis deviation and nonspecific ST-T wave changes, BPM of 71.  -Echocardiogram done May 2023 demonstrated normal left ventricular systolic function EF 64%, mild mitral regurgitation, mild tricuspid regurgitation, trace aortic regurgitation.   -Carotid Doppler done May 2023 demonstrated no stenosis bilaterally.   -EKG done 02/01/2023 which demonstrated regular sinus rhythm with nonspecific ST-T wave changes BPM of 74.  -EKG done 11/29/2022 which demonstrated regular sinus rhythm with nonspecific ST-T wave changes BPM of 78.  -The patient had normal exercise stress test August 16, 2022  -EKG done 05/10/2022 which demonstrated regular sinus rhythm with nonspecific ST-T wave changes BPM of 66.   -Electrocardiogram done 5/25/2021 which demonstrated normal sinus rhythm rate 84 beats per minute otherwise remarkable for left axis deviation and left atrial abnormality.  -The patient at chest CAT scan in 2017 which demonstrated coronary artery calcification. A new CAT scan done January 15, 2019 confirm the presence of coronary atherosclerosis. There was no detailed definition regarding extent the location of the coronary calcification.  -The patient had a nuclear stress test done by his prior cardiologist April 30, 2018 which demonstrated normal coronary perfusion with an estimated ejection fraction 61% and normal wall motion.  -The patient had a coronary calcium score done February 26, 2019 which demonstrated a total coronary calcium level of 129 units, all in the left anterior descending artery.  -Echocardiogram done 3/5/2019 demonstrated mild mitral tricuspid and pulmonic regurgitation with normal left ventricular systolic function. EF 63%.  -Echocardiogram done on 10/19/2021 demonstrated thickened mitral valve, mild MR, thickened aortic valve, mild TR with normal left ventricular systolic function. EF of 65%.  PLAN: -He will continue with his current medications and will contact the office if he is having any complaints between now and their next follow up appointment. -Patient will schedule echocardiogram doppler examination to evaluate murmur, left ventricular function, chamber size, and rule out hypertrophy. -He will schedule a bilateral carotid doppler to evaluate for carotid artery stenosis.   I have discussed the plan of care with KAL COWAN and he will follow up in 4-6 months. They are compliant with all of their medications.     The patient understands that aerobic exercises must be increased to 40 minutes 4 times/week and a detailed discussion of lifestyle modification was done today.   The patient has a good understanding of the diagnosis, treatment plan and lifestyle modification.   They will contact me at the office for any questions with their care or any changes in their health status.   The patient was discussed with supervision physician Dr. Jacques Brooks at the time of the visit and the plan of care will be carried out as noted above.     SHELL Mixon NP

## 2024-06-17 ENCOUNTER — APPOINTMENT (OUTPATIENT)
Dept: ORTHOPEDIC SURGERY | Facility: CLINIC | Age: 69
End: 2024-06-17
Payer: MEDICARE

## 2024-06-17 VITALS — BODY MASS INDEX: 18.34 KG/M2 | HEIGHT: 68 IN | WEIGHT: 121 LBS

## 2024-06-17 PROCEDURE — 99213 OFFICE O/P EST LOW 20 MIN: CPT | Mod: 25

## 2024-06-17 PROCEDURE — 73564 X-RAY EXAM KNEE 4 OR MORE: CPT | Mod: 50

## 2024-06-17 PROCEDURE — 20611 DRAIN/INJ JOINT/BURSA W/US: CPT | Mod: 50

## 2024-06-17 NOTE — HISTORY OF PRESENT ILLNESS
[Gradual] : gradual [10] : 10 [Constant] : constant [Rest] : rest [Meds] : meds [Standing] : standing [Walking] : walking [Stairs] : stairs [de-identified] : recently baby sitting grandchildren and stairs causing a lot of pain, for 6 months, no injury, completed HA April 2021 with help, on no meds,   [] : no [FreeTextEntry1] : B/L knees  [FreeTextEntry5] : Patient states he has been treated in the past for both knees with  and states the pain has returned. LT knee is worse.  [FreeTextEntry9] : tylenol  [de-identified] :

## 2024-06-17 NOTE — DISCUSSION/SUMMARY
[de-identified] : Patient allowed to gently start resuming activities. Discussed change to medication prescription and usage. Offered cortisone steroid injection.for pain control Bracing options discussed with patient. Hyaluronic Acid inj pamphlet given to pt. try topical lidocaine for pain reviewed current medications being used by this patient Home exercise for functional improvement 06/17/2024  RE:  KAL COWAN   Acct #- [id number]  Attention:  Nurse Reviewer /Medical Director  I am writing this letter as a medical necessity for HA euflexxa both knees Patient has tried analgesics, non-steroid anti-inflammatory agents,  physical therapy, hot or cold compresses,injections of corticosteroids, etc)  which in combination or by themselves has not worked. Based on my patient's condition, I strongly believe that the Hyaluronic aid injections is medically needed.   Thank you for your time and consideration.

## 2024-06-17 NOTE — PHYSICAL EXAM
[5___] : quadriceps 5[unfilled]/5 [Negative] : negative Augustin's [] : mildly antalgic [Bilateral] : knee bilaterally [All Views] : anteroposterior, lateral, skyline, and anteroposterior standing [Degenerative change] : Degenerative change [FreeTextEntry9] : unchanged from 2021 [TWNoteComboBox7] : flexion 110 degrees

## 2024-06-20 RX ORDER — EZETIMIBE 10 MG/1
10 TABLET ORAL
Qty: 90 | Refills: 1 | Status: ACTIVE | COMMUNITY
Start: 2019-05-15 | End: 1900-01-01

## 2024-06-24 ENCOUNTER — APPOINTMENT (OUTPATIENT)
Dept: ORTHOPEDIC SURGERY | Facility: CLINIC | Age: 69
End: 2024-06-24
Payer: MEDICARE

## 2024-06-24 PROCEDURE — 20611 DRAIN/INJ JOINT/BURSA W/US: CPT | Mod: 50

## 2024-06-27 ENCOUNTER — APPOINTMENT (OUTPATIENT)
Dept: ORTHOPEDIC SURGERY | Facility: CLINIC | Age: 69
End: 2024-06-27
Payer: MEDICARE

## 2024-06-27 DIAGNOSIS — M17.12 UNILATERAL PRIMARY OSTEOARTHRITIS, LEFT KNEE: ICD-10-CM

## 2024-06-27 DIAGNOSIS — M17.11 UNILATERAL PRIMARY OSTEOARTHRITIS, RIGHT KNEE: ICD-10-CM

## 2024-06-27 PROCEDURE — 20611 DRAIN/INJ JOINT/BURSA W/US: CPT | Mod: 50

## 2024-07-30 ENCOUNTER — APPOINTMENT (OUTPATIENT)
Dept: PULMONOLOGY | Facility: CLINIC | Age: 69
End: 2024-07-30
Payer: MEDICARE

## 2024-07-30 VITALS
HEIGHT: 68 IN | TEMPERATURE: 98.1 F | DIASTOLIC BLOOD PRESSURE: 68 MMHG | SYSTOLIC BLOOD PRESSURE: 116 MMHG | OXYGEN SATURATION: 97 % | BODY MASS INDEX: 19.25 KG/M2 | HEART RATE: 82 BPM | WEIGHT: 127 LBS

## 2024-07-30 DIAGNOSIS — J44.9 CHRONIC OBSTRUCTIVE PULMONARY DISEASE, UNSPECIFIED: ICD-10-CM

## 2024-07-30 DIAGNOSIS — R91.8 OTHER NONSPECIFIC ABNORMAL FINDING OF LUNG FIELD: ICD-10-CM

## 2024-07-30 DIAGNOSIS — J47.9 BRONCHIECTASIS, UNCOMPLICATED: ICD-10-CM

## 2024-07-30 DIAGNOSIS — F17.200 NICOTINE DEPENDENCE, UNSPECIFIED, UNCOMPLICATED: ICD-10-CM

## 2024-07-30 PROCEDURE — 95012 NITRIC OXIDE EXP GAS DETER: CPT

## 2024-07-30 PROCEDURE — 94060 EVALUATION OF WHEEZING: CPT

## 2024-07-30 PROCEDURE — 94727 GAS DIL/WSHOT DETER LNG VOL: CPT

## 2024-07-30 PROCEDURE — 94729 DIFFUSING CAPACITY: CPT

## 2024-07-30 PROCEDURE — 99214 OFFICE O/P EST MOD 30 MIN: CPT | Mod: 25

## 2024-07-30 NOTE — DISCUSSION/SUMMARY
[FreeTextEntry1] : COPD with bronchiectasis with mild Flare- ZPAK + medrol- RETX and  continue  DUONEB QID + ceftin x 7  days + prednisone and then transition to   BREZTRI  or Trelegy at f/u resolved no active symptoms Pulmonary physiology demonstrates interval improvement with a mild obstructive pattern. CT chest up-to-date low-dose protocol No active sxs post Flare Noted Breztri discontinued due to patient's significant dry mouth We started Symbicort 160-4.5 mcg 2 puffs twice daily and instructions to rinse noted sxs cleared Active tobacco use PREDM per ENT LPR- pending EGD Continue to advised on tobacco cessation he remain on Spiriva Asmanex as ordered- OFF PRN Pro Air follow-up 3 months Prevnair  13 April 2021 Pneumonia 13/23 Patient states Up To Date Flu vaccine up to date 2021 Shingles x 2 completed T DAP  2021  3-7 minute discussion with patient about smoking cessation was initiated with patient showing interest in attempting to quit smoking. Problems with risks of continued tobacco smoking including respiratory, cardiovascular, and oncological problems were discussed. Advice on smoking cessation was reiterated including methods of quitting, setting a date to quit, and different medicines and support systems available for smoking cessation was discussed. Addressed  options including nicotine patches gums lozenges, Wellbutrin, Chantix as well as smoking cessation program. f/u  3 months noted discussed use of controller therapy Follow-up PFT December 2023 low-dose CAT scan screening follow-up protocol Based on tobacco history and risk factors patient does qualify for low-dose CAT scan screening protocol.  Risks benefits of low-dose screening protocol discussed in detail with patient. All questions answered at today's visit. NOTED Up to  Date COVID vaccine  and  Flu  Vccien Fall 2023  Cardiology f/u Completed MODERNA + booster x 2 with  variant booster Fall Flu COVIDF shgingles RSV 2023 up to  date states pneumonia  vaccine with PMD and statres Flu  vacine up to   date  REMINDER CT CHEST  April 2025 staters pneumonia  vaccine up to date PCV 20 at Pharm All questions answered at today's visit.

## 2024-07-30 NOTE — PROCEDURE
[FreeTextEntry1] : PFT July 30, 2024 Well-preserved flow rates Mild obstructive pattern Lung labs are normal diffusion low but normal range 73% predicted Hemoglobin 14.2 Overall interval improvement of pulmonary physiology NIOX 9 normal range July 30th 2024  PFT 4/30/24 Well preserved flow  rates WNL  Mild OAD  Lung Volumes nl  No  air trapping  DLCO 65 % with mild loss fx  alveolar  capillary units  HGB 14.2 CT chest low-dose CAT scan screening protocol January 29, 2024  [facility] Subcentimeter small nodules measuring up to 4 mm No new nodules Lung RADS category 2 benign 1 year follow-up screening low-dose CAT scan protocol  NIOX  13  ppb WNL  12/5/23 Spirometry 12/5/2023 Well-preserved normal flow rates Ratio 70 Obstructive pattern mild NIOX 6 normal range   PFT 10/31/23  Well preserved flow  rate  Mild OAD  Lung volumes nl no  air trapping  DLCO 76 %  HGB 14.6 interval improvement overall pulmonary physiology  Spirometry no bronchodilator October 10, 2023 Well-preserved flow rates Mild obstructive ventilatory impairment Compared to May 2023 there is a significant decline at the flow rates  Chest x-ray PA lateral October 10, 2023 Hyperaeration No appreciable dominant pulmonary nodules parenchymal infiltrates or pleural effusions Impression most consistent with COPD  VALDO 5/16/23  variable  loops ratio 63  OAD NIOX  14 ppb 5/16/23 normal  range  PFT 1/10/23  Well preserved flow  rates  Mild OAD Lung Volumes nl  DLCO 63% mild loss fx  alveolar  IMP copd  EXAM: [id number] - CT LDCT LUNG CA SCREENING - ORDERED BY: [facility]  PROCEDURE DATE: 12/13/2022 INTERPRETATION: INDICATION: >20 pack year history of smoking. Individual is Current smoker. Lung cancer screening. TECHNIQUE: Low dose CT scan of the chest was obtained without intravenous contrast. COMPARISON: October 4, 2021 Coronary artery calcification: Moderate Mediastinal and/heart/vessels: Thyroid gland is unremarkable. Aorta and pulmonary arteries are normal in size. No pericardial effusion. No lymphadenopathy. Airways/lung/pleura: Right lower lobe 3 mm nodule on series 2 image 106 is unchanged. Previously mentioned right upper lobe nodule not seen clearly on the present examination. Biapical pleural-parenchymal scarring is noted. Visible abdomen: Unremarkable noncontrast appearance. Soft tissue/bones: No suspicious osseous lesions. Impression: Stable right lower lobe nodule as above. Previously described right upper lobe nodules not clearly identified. Lung-RADS Category 2: Benign Appearance or Behavior. Nodules with a very low likelihood of becoming a clinically active cancer due to size or lack of growth. Continue annual screening with low-dose CT in 12 months.        CAROLANN BAGLEY MD; Attending Radiologist This document has been electronically signed. Dec 16 2022 12:37PM   Provider Note CT chest 12/13/2022 lung RADS category 2 Right lower lobe 3 mm pulmonary nodule Changed Previously noted right upper lobe nodule not seen on present exam Biapical pleural-parenchymal scar 1 year follow-up low-dose protocol Patient informed  PFT 9/13/22 Well preserved flow rates Lung Volumes normal DLCO 63 % with mild loss fx  alveolar capillary units HGB13.1  HGB 15.2 pos decline at DLCO  Chest x-ray PA lateral Normal cardiac size Prominence of the right fissure No dominant pulmonary nodules Note pleural effusions pneumothorax Positive hyperaeration Impression consistent with known COPD  PFT May 27, 2022 Well-preserved flow rates Mild obstructive ventilatory impairment Positive bronchodilator response at small airways Lung volumes suggest air trapping with increased % predicted Specific conductance and resistance normal range Diffusion normal range 75% predicted Data comparison January 11, 2022 demonstrates interval improvement at the total lung capacity otherwise overall stable pulmonary physiology  Pulmonary 6-minute walk exercise study May 27, 2022 Baseline room air O2 saturation 98% Impression normal study No room air desaturation NIOX 7 normal range   Laboratory data ordered by primary care physician 5/11/22 Reviewed Normal renal function normal electrolytes Liver function testing normal Uric acid 3.7 Magnesium 2.2 cholesterol 121 hemoglobin A1c 5.8 TSH 1.66   PFT 1/11/22 Well preserved flow rates Mild OAD Lung Volumes  nl range  Resistance nl Sp Conductance decreased Low nl DLCO 74 % pred HGB 15.1 NIOX 11 ppb  CT low-dose screening f/u Oct 2022 October 4, 2021 Lung RADS 2 Stable few small pulmonary nodules findings Mild secretions in the trachea 1 year follow-up recommended  PFT 7/27/21 Well preserved flow rates  Mild OAD  Lung Volumes nl  Low nl DLCL 73 %   HGB 13.0  stable pulmonary physiology NIOX 11 ppb  nl range  X-ray PA lateral September 27, 2021 Cardiac size normal Grossly clear lung fields No appreciable dominant pulmonary nodules Maritza mediastinum unremarkable Soft tissue bony structures unremarkable No pneumothorax Increased retrosternal airspace with flattening of the diaphragms consistent with hyper aeration Impression COPD  NIOC  10 ppb  4/27/21 NL PFT 4/27 21 Mild OAD  Lung Volumes nl DLCO 68 % with minimal loss fx  alveolar  capillary units  HGB 13.0  Chest CT scan low-dose protocol September 3, 2020 Baseline study Did compared to studies of January 15, 2019 January 15, 2017 Left upper lobe pulmonary nodule 3 mm no change Mild tracheobronchial secretions Adenopathy negative Impression lung RADS 2 Annual screening low-dose chest CT 1 year  NIOX 8  ppb August 20, 2020 PFT without bronchodilator August 20, 2020 Well-preserved flow rates mild obstructive pattern Normal lung volumes with borderline air trapping Specific conductance and resistance normal Reduction diffusion 65% of predicted with a loss of functioning alveolocapillary units Hemoglobin 14.9  Pulmonary 6-minute walk stress test August 20, 2020 Baseline room air O2 saturation 98% No desaturation Impression normal study  Chest x-ray PA lateral projection January 3, 2019 Cardiac size normal Hyper aeration Peribronchial cuffing No dominant pulmonary nodules parenchymal infiltrates pleural effusions Mediastinum normal Increased retrosternal airspace consistent with air-trapping Impression COPD  PCV 23 1/2020  Echocardiogram study completed October 19, 2021 Ejection fraction estimate 65% Minimal mitral regurgitation Thickened aortic valve Mild tricuspid regurgitation No pulmonary hypertension  Shringrx up to date Check re Prevnair COMPLETED 20  HD Flu 9/13/22

## 2024-08-16 RX ORDER — VARENICLINE 0.5 MG/1
0.5 TABLET, FILM COATED ORAL
Qty: 1 | Refills: 0 | Status: ACTIVE | COMMUNITY
Start: 2024-08-15 | End: 1900-01-01

## 2024-10-16 ENCOUNTER — INPATIENT (INPATIENT)
Facility: HOSPITAL | Age: 69
LOS: 1 days | Discharge: HOME | DRG: 399 | End: 2024-10-18
Attending: SURGERY | Admitting: SURGERY
Payer: MEDICARE

## 2024-10-16 ENCOUNTER — TRANSCRIPTION ENCOUNTER (OUTPATIENT)
Age: 69
End: 2024-10-16

## 2024-10-16 ENCOUNTER — RESULT REVIEW (OUTPATIENT)
Age: 69
End: 2024-10-16

## 2024-10-16 VITALS
RESPIRATION RATE: 20 BRPM | SYSTOLIC BLOOD PRESSURE: 105 MMHG | OXYGEN SATURATION: 99 % | HEIGHT: 68 IN | TEMPERATURE: 98 F | HEART RATE: 88 BPM | WEIGHT: 121.92 LBS | DIASTOLIC BLOOD PRESSURE: 67 MMHG

## 2024-10-16 DIAGNOSIS — Z90.49 ACQUIRED ABSENCE OF OTHER SPECIFIED PARTS OF DIGESTIVE TRACT: Chronic | ICD-10-CM

## 2024-10-16 DIAGNOSIS — C44.320 SQUAMOUS CELL CARCINOMA OF SKIN OF UNSPECIFIED PARTS OF FACE: Chronic | ICD-10-CM

## 2024-10-16 DIAGNOSIS — K35.80 UNSPECIFIED ACUTE APPENDICITIS: ICD-10-CM

## 2024-10-16 LAB
ALBUMIN SERPL ELPH-MCNC: 4.2 G/DL — SIGNIFICANT CHANGE UP (ref 3.3–5)
ALP SERPL-CCNC: 49 U/L — SIGNIFICANT CHANGE UP (ref 40–120)
ALT FLD-CCNC: 15 U/L — SIGNIFICANT CHANGE UP (ref 10–45)
ANION GAP SERPL CALC-SCNC: 12 MMOL/L — SIGNIFICANT CHANGE UP (ref 5–17)
APPEARANCE UR: CLEAR — SIGNIFICANT CHANGE UP
APTT BLD: 29 SEC — SIGNIFICANT CHANGE UP (ref 24.5–35.6)
AST SERPL-CCNC: 13 U/L — SIGNIFICANT CHANGE UP (ref 10–40)
BACTERIA # UR AUTO: NEGATIVE /HPF — SIGNIFICANT CHANGE UP
BASOPHILS # BLD AUTO: 0.03 K/UL — SIGNIFICANT CHANGE UP (ref 0–0.2)
BASOPHILS NFR BLD AUTO: 0.2 % — SIGNIFICANT CHANGE UP (ref 0–2)
BILIRUB SERPL-MCNC: 0.9 MG/DL — SIGNIFICANT CHANGE UP (ref 0.2–1.2)
BILIRUB UR-MCNC: NEGATIVE — SIGNIFICANT CHANGE UP
BUN SERPL-MCNC: 18 MG/DL — SIGNIFICANT CHANGE UP (ref 7–23)
CALCIUM SERPL-MCNC: 9.2 MG/DL — SIGNIFICANT CHANGE UP (ref 8.4–10.5)
CAST: 2 /LPF — SIGNIFICANT CHANGE UP (ref 0–4)
CHLORIDE SERPL-SCNC: 98 MMOL/L — SIGNIFICANT CHANGE UP (ref 96–108)
CO2 SERPL-SCNC: 25 MMOL/L — SIGNIFICANT CHANGE UP (ref 22–31)
COLOR SPEC: YELLOW — SIGNIFICANT CHANGE UP
CREAT SERPL-MCNC: 0.72 MG/DL — SIGNIFICANT CHANGE UP (ref 0.5–1.3)
DIFF PNL FLD: ABNORMAL
EGFR: 99 ML/MIN/1.73M2 — SIGNIFICANT CHANGE UP
EOSINOPHIL # BLD AUTO: 0.03 K/UL — SIGNIFICANT CHANGE UP (ref 0–0.5)
EOSINOPHIL NFR BLD AUTO: 0.2 % — SIGNIFICANT CHANGE UP (ref 0–6)
GAS PNL BLDV: SIGNIFICANT CHANGE UP
GLUCOSE SERPL-MCNC: 114 MG/DL — HIGH (ref 70–99)
GLUCOSE UR QL: NEGATIVE MG/DL — SIGNIFICANT CHANGE UP
HCT VFR BLD CALC: 38.1 % — LOW (ref 39–50)
HGB BLD-MCNC: 12.9 G/DL — LOW (ref 13–17)
IMM GRANULOCYTES NFR BLD AUTO: 0.6 % — SIGNIFICANT CHANGE UP (ref 0–0.9)
INR BLD: 1.14 RATIO — SIGNIFICANT CHANGE UP (ref 0.85–1.16)
KETONES UR-MCNC: NEGATIVE MG/DL — SIGNIFICANT CHANGE UP
LEUKOCYTE ESTERASE UR-ACNC: NEGATIVE — SIGNIFICANT CHANGE UP
LIDOCAIN IGE QN: 14 U/L — SIGNIFICANT CHANGE UP (ref 7–60)
LYMPHOCYTES # BLD AUTO: 1.29 K/UL — SIGNIFICANT CHANGE UP (ref 1–3.3)
LYMPHOCYTES # BLD AUTO: 9.1 % — LOW (ref 13–44)
MCHC RBC-ENTMCNC: 32.7 PG — SIGNIFICANT CHANGE UP (ref 27–34)
MCHC RBC-ENTMCNC: 33.9 GM/DL — SIGNIFICANT CHANGE UP (ref 32–36)
MCV RBC AUTO: 96.7 FL — SIGNIFICANT CHANGE UP (ref 80–100)
MONOCYTES # BLD AUTO: 1.31 K/UL — HIGH (ref 0–0.9)
MONOCYTES NFR BLD AUTO: 9.2 % — SIGNIFICANT CHANGE UP (ref 2–14)
NEUTROPHILS # BLD AUTO: 11.43 K/UL — HIGH (ref 1.8–7.4)
NEUTROPHILS NFR BLD AUTO: 80.7 % — HIGH (ref 43–77)
NITRITE UR-MCNC: NEGATIVE — SIGNIFICANT CHANGE UP
NRBC # BLD: 0 /100 WBCS — SIGNIFICANT CHANGE UP (ref 0–0)
PH UR: 6 — SIGNIFICANT CHANGE UP (ref 5–8)
PLATELET # BLD AUTO: 188 K/UL — SIGNIFICANT CHANGE UP (ref 150–400)
POTASSIUM SERPL-MCNC: 4.3 MMOL/L — SIGNIFICANT CHANGE UP (ref 3.5–5.3)
POTASSIUM SERPL-SCNC: 4.3 MMOL/L — SIGNIFICANT CHANGE UP (ref 3.5–5.3)
PROT SERPL-MCNC: 7.1 G/DL — SIGNIFICANT CHANGE UP (ref 6–8.3)
PROT UR-MCNC: 30 MG/DL
PROTHROM AB SERPL-ACNC: 13.1 SEC — SIGNIFICANT CHANGE UP (ref 9.9–13.4)
RBC # BLD: 3.94 M/UL — LOW (ref 4.2–5.8)
RBC # FLD: 11.7 % — SIGNIFICANT CHANGE UP (ref 10.3–14.5)
RBC CASTS # UR COMP ASSIST: 14 /HPF — HIGH (ref 0–4)
SODIUM SERPL-SCNC: 135 MMOL/L — SIGNIFICANT CHANGE UP (ref 135–145)
SP GR SPEC: 1.02 — SIGNIFICANT CHANGE UP (ref 1–1.03)
SQUAMOUS # UR AUTO: 1 /HPF — SIGNIFICANT CHANGE UP (ref 0–5)
UROBILINOGEN FLD QL: 0.2 MG/DL — SIGNIFICANT CHANGE UP (ref 0.2–1)
WBC # BLD: 14.17 K/UL — HIGH (ref 3.8–10.5)
WBC # FLD AUTO: 14.17 K/UL — HIGH (ref 3.8–10.5)
WBC UR QL: 2 /HPF — SIGNIFICANT CHANGE UP (ref 0–5)

## 2024-10-16 PROCEDURE — 44970 LAPAROSCOPY APPENDECTOMY: CPT

## 2024-10-16 PROCEDURE — 99285 EMERGENCY DEPT VISIT HI MDM: CPT | Mod: GC

## 2024-10-16 PROCEDURE — 88304 TISSUE EXAM BY PATHOLOGIST: CPT | Mod: 26

## 2024-10-16 PROCEDURE — 74177 CT ABD & PELVIS W/CONTRAST: CPT | Mod: 26,MC

## 2024-10-16 PROCEDURE — 99223 1ST HOSP IP/OBS HIGH 75: CPT | Mod: 57

## 2024-10-16 DEVICE — STAPLER COVIDIEN TRI-STAPLE 45MM PURPLE RELOAD: Type: IMPLANTABLE DEVICE | Status: FUNCTIONAL

## 2024-10-16 RX ORDER — FINASTERIDE 5 MG/1
5 TABLET, FILM COATED ORAL DAILY
Refills: 0 | Status: DISCONTINUED | OUTPATIENT
Start: 2024-10-16 | End: 2024-10-16

## 2024-10-16 RX ORDER — ENOXAPARIN SODIUM 150 MG/ML
40 INJECTION SUBCUTANEOUS EVERY 24 HOURS
Refills: 0 | Status: DISCONTINUED | OUTPATIENT
Start: 2024-10-16 | End: 2024-10-18

## 2024-10-16 RX ORDER — SODIUM CHLORIDE IRRIG SOLUTION 0.9 %
1000 SOLUTION, IRRIGATION IRRIGATION ONCE
Refills: 0 | Status: COMPLETED | OUTPATIENT
Start: 2024-10-16 | End: 2024-10-16

## 2024-10-16 RX ORDER — EZETIMIBE 10 MG/1
10 TABLET ORAL DAILY
Refills: 0 | Status: DISCONTINUED | OUTPATIENT
Start: 2024-10-16 | End: 2024-10-18

## 2024-10-16 RX ORDER — HYDROMORPHONE HYDROCHLORIDE 1 MG/ML
0.5 INJECTION, SOLUTION INTRAMUSCULAR; INTRAVENOUS; SUBCUTANEOUS
Refills: 0 | Status: DISCONTINUED | OUTPATIENT
Start: 2024-10-16 | End: 2024-10-16

## 2024-10-16 RX ORDER — CEFTRIAXONE SODIUM 1 G
1000 VIAL (EA) INJECTION EVERY 24 HOURS
Refills: 0 | Status: CANCELLED | OUTPATIENT
Start: 2024-10-17 | End: 2024-10-16

## 2024-10-16 RX ORDER — FINASTERIDE 5 MG/1
5 TABLET, FILM COATED ORAL DAILY
Refills: 0 | Status: DISCONTINUED | OUTPATIENT
Start: 2024-10-16 | End: 2024-10-18

## 2024-10-16 RX ORDER — OXYCODONE HYDROCHLORIDE 30 MG/1
2.5 TABLET, FILM COATED, EXTENDED RELEASE ORAL EVERY 4 HOURS
Refills: 0 | Status: DISCONTINUED | OUTPATIENT
Start: 2024-10-16 | End: 2024-10-18

## 2024-10-16 RX ORDER — ROSUVASTATIN CALCIUM 20 MG/1
40 TABLET, COATED ORAL AT BEDTIME
Refills: 0 | Status: DISCONTINUED | OUTPATIENT
Start: 2024-10-16 | End: 2024-10-18

## 2024-10-16 RX ORDER — OXYCODONE HYDROCHLORIDE 30 MG/1
5 TABLET, FILM COATED, EXTENDED RELEASE ORAL EVERY 4 HOURS
Refills: 0 | Status: DISCONTINUED | OUTPATIENT
Start: 2024-10-16 | End: 2024-10-18

## 2024-10-16 RX ORDER — TAMSULOSIN HCL 0.4 MG
0.4 CAPSULE ORAL AT BEDTIME
Refills: 0 | Status: DISCONTINUED | OUTPATIENT
Start: 2024-10-16 | End: 2024-10-16

## 2024-10-16 RX ORDER — ASPIRIN 325 MG
81 TABLET ORAL DAILY
Refills: 0 | Status: DISCONTINUED | OUTPATIENT
Start: 2024-10-16 | End: 2024-10-18

## 2024-10-16 RX ORDER — CEFTRIAXONE SODIUM 1 G
1000 VIAL (EA) INJECTION EVERY 24 HOURS
Refills: 0 | Status: COMPLETED | OUTPATIENT
Start: 2024-10-16 | End: 2024-10-17

## 2024-10-16 RX ORDER — FUROSEMIDE 10 MG/ML
20 INJECTION INTRAVENOUS ONCE
Refills: 0 | Status: DISCONTINUED | OUTPATIENT
Start: 2024-10-16 | End: 2024-10-16

## 2024-10-16 RX ORDER — ACETAMINOPHEN 325 MG
1000 TABLET ORAL EVERY 6 HOURS
Refills: 0 | Status: DISCONTINUED | OUTPATIENT
Start: 2024-10-16 | End: 2024-10-18

## 2024-10-16 RX ORDER — SODIUM CHLORIDE IRRIG SOLUTION 0.9 %
500 SOLUTION, IRRIGATION IRRIGATION ONCE
Refills: 0 | Status: COMPLETED | OUTPATIENT
Start: 2024-10-16 | End: 2024-10-16

## 2024-10-16 RX ORDER — ROSUVASTATIN CALCIUM 20 MG/1
40 TABLET, COATED ORAL AT BEDTIME
Refills: 0 | Status: DISCONTINUED | OUTPATIENT
Start: 2024-10-16 | End: 2024-10-16

## 2024-10-16 RX ORDER — TAMSULOSIN HCL 0.4 MG
0.4 CAPSULE ORAL AT BEDTIME
Refills: 0 | Status: DISCONTINUED | OUTPATIENT
Start: 2024-10-16 | End: 2024-10-18

## 2024-10-16 RX ORDER — ONDANSETRON HCL/PF 4 MG/2 ML
4 VIAL (ML) INJECTION ONCE
Refills: 0 | Status: DISCONTINUED | OUTPATIENT
Start: 2024-10-16 | End: 2024-10-16

## 2024-10-16 RX ORDER — CEFTRIAXONE SODIUM 1 G
1000 VIAL (EA) INJECTION ONCE
Refills: 0 | Status: COMPLETED | OUTPATIENT
Start: 2024-10-16 | End: 2024-10-16

## 2024-10-16 RX ORDER — ASPIRIN 325 MG
81 TABLET ORAL DAILY
Refills: 0 | Status: CANCELLED | OUTPATIENT
Start: 2024-10-17 | End: 2024-10-16

## 2024-10-16 RX ORDER — ACETAMINOPHEN 325 MG
1000 TABLET ORAL ONCE
Refills: 0 | Status: COMPLETED | OUTPATIENT
Start: 2024-10-16 | End: 2024-10-16

## 2024-10-16 RX ORDER — EZETIMIBE 10 MG/1
10 TABLET ORAL DAILY
Refills: 0 | Status: DISCONTINUED | OUTPATIENT
Start: 2024-10-16 | End: 2024-10-16

## 2024-10-16 RX ORDER — FENTANYL CITRATE-0.9 % NACL/PF 300MCG/30
25 PATIENT CONTROLLED ANALGESIA VIAL INJECTION ONCE
Refills: 0 | Status: DISCONTINUED | OUTPATIENT
Start: 2024-10-16 | End: 2024-10-16

## 2024-10-16 RX ADMIN — Medication 100 MILLIGRAM(S): at 11:46

## 2024-10-16 RX ADMIN — Medication 500 MILLILITER(S): at 12:22

## 2024-10-16 RX ADMIN — Medication 100 MILLIGRAM(S): at 10:59

## 2024-10-16 RX ADMIN — Medication 1000 MILLIGRAM(S): at 11:29

## 2024-10-16 RX ADMIN — Medication 25 MICROGRAM(S): at 12:51

## 2024-10-16 RX ADMIN — Medication 1000 MILLILITER(S): at 07:23

## 2024-10-16 RX ADMIN — Medication 25 MICROGRAM(S): at 12:21

## 2024-10-16 RX ADMIN — Medication 500 MILLIGRAM(S): at 12:46

## 2024-10-16 RX ADMIN — Medication 500 MILLILITER(S): at 13:22

## 2024-10-16 RX ADMIN — Medication 400 MILLIGRAM(S): at 07:23

## 2024-10-16 NOTE — ED PROVIDER NOTE - PHYSICAL EXAMINATION
[facility] met with patient today to provide an introduction to self, the [id number] [facility]. Patient and son not interested in [id number] program at this time. Bundled Payment Care Program: 
 
Patient was provided a copy of the [facility] letter. Patient states that they have a functioning scale at home. Patient was not provided a scale during this visit. Reinforced the importance of checking their weight at the same time daily and calling the cardiologist if their weight is up 3 lbs. in a day or 5 lbs. in a week (or per MD guidelines). Patient  has not received a \"Living with Heart Failure\" patient education book. Advised Cardiac Rehab will bring one by. [facility]:  
 
All questions answered at this time. Discussed information on low sodium diet. GENERAL: Not in acute distress, non-toxic appearing  HEAD: normocephalic, atraumatic  HEENT: EOMI, normal conjunctiva, oral mucosa moist, neck supple  CARDIAC: appears well perfused, RRR  PULM: normal work of breathing, CTAB  GI: abdomen nondistended, + moderate tenderness to RLQ, negative psoas sign no guarding or rebound tenderness  NEURO: alert and oriented x 3, normal speech, no focal motor or sensory deficits, gait normal, no gross neurologic deficit  MSK: No visible deformities, no peripheral edema,   SKIN: No visible rashes, dry, well-perfused  PSYCH: appropriate mood and affect

## 2024-10-16 NOTE — ED PROVIDER NOTE - OBJECTIVE STATEMENT
69-year-old male with history of IBS, hyperlipidemia, BPH, incarcerated hernia requiring surgical repair 2 years ago at outside hospital comes into the ED today for evaluation of lower abdominal pain along with constipation and inability to pass gas for the past 2 days.  Patient states he last had a normal bowel movement 2 days ago.  Yesterday he had a bowel movement but it was a very small amount of stool.  He has not been able to pass any gas and had 2 episodes of vomiting yesterday.  He denies any fevers or chills/urinary symptoms.  He still has his appendix as well as gallbladder.

## 2024-10-16 NOTE — ED PROVIDER NOTE - CLINICAL SUMMARY MEDICAL DECISION MAKING FREE TEXT BOX
Attending Debbie:  70 y/o m hx of incarcerated hernia s/p repair, abd pain x 2 days, decreased bm over past two days, distended abd, pos nv, no urinary symptoms, no sob,  no cp, no palpitaitons, afebrile vitals stable  non toxic well appearing, NC/AT,  conjunctiva non conjected, sclera anicteric, moist mucous membranes, neck supple, heart sounds, normal, no mrg, lungs cta b/l no wrr, abd soft non distended w/ pos gen tenderness, rlq ttp greatest no visual deformities of extremities, axox3, , normal mood and affect, plan for cbc, cmp, lipase, ct iv po contrast

## 2024-10-16 NOTE — ED PROVIDER NOTE - PROGRESS NOTE DETAILS
Received signout on patient from overnight team pending labs and CT. - Sony Burton PA-C Surgery called back, informed of consult and will see patient.  Surgery resident Yvan requesting ceftriaxone and Flagyl be started.  Patient updated on results and plan for surgical evaluation at this time. - Sony Burton PA-C

## 2024-10-16 NOTE — ED ADULT NURSE NOTE - NSFALLUNIVINTERV_ED_ALL_ED
Bed/Stretcher in lowest position, wheels locked, appropriate side rails in place/Call bell, personal items and telephone in reach/Instruct patient to call for assistance before getting out of bed/chair/stretcher/Non-slip footwear applied when patient is off stretcher/[place] to call system/Physically safe environment - no spills, clutter or unnecessary equipment/Purposeful proactive rounding/Room/bathroom lighting operational, light cord in reach

## 2024-10-16 NOTE — ED PROVIDER NOTE - PATIENT'S PREFERRED PRONOUN
ITZEL:     RUR 9%     Disposition: Home with significant other.  Patient has used [facility] in the past.     Transportation: [facility]     Follow up: PCP/Specialist     Primary contact: Timothy Turner([phone])   Faith Wilson RN/CRM  [phone] Him/He

## 2024-10-16 NOTE — H&P ADULT - HISTORY OF PRESENT ILLNESS
Mr. Quick is a 69 year old man with a pertinent history of prior emergency surgery to repair an incarcerated left inguinal hernia with mesh placement (2022), hyperlipidemia, and active smoking (8 cigarettes per day) presenting with two days of right lower quadrant pain and constipation. He reports that the pain began abruptly and has not been associated with fevers, chills, nausea or vomiting. His last colonoscopy was 2-3 years ago and reportedly unremarkable, and he is otherwise up-to-date on health maintenance. He is able to walk up two flights of stairs without becoming short of breath and takes no antithrombotic medications. In the ED he is hemodynamically stable and in no acute distress with laboratory studies notable only for leukocytosis (14.17); remainder of basic metabolic panel, complete blood count and liver chemistries unremarkable. Cross-sectional imaging demonstrates uncomplicated acute appendicitis.

## 2024-10-16 NOTE — ED ADULT NURSE NOTE - PAIN RATING/NUMBER SCALE (0-10): ACTIVITY
Braydon Simmons Patient Age: 41 year old  MESSAGE:   Patient calling in with question about post vasectomy Sieman analyst and wanted to verify if patient can much that off for a bit. Call transferred to clinical staff. Please advise        WEIGHT AND HEIGHT:   Wt Readings from Last 1 Encounters:   12/11/19 83 kg (183 lb)     Ht Readings from Last 1 Encounters:   12/11/19 6' (1.829 m)     BMI Readings from Last 1 Encounters:   12/11/19 24.82 kg/m²       ALLERGIES:  Seasonal  Current Outpatient Medications   Medication   • HYDROcodone-acetaminophen (NORCO) 5-325 MG per tablet     No current facility-administered medications for this visit.      PHARMACY to use:           Pharmacy preference(s) on file:   [id number] - [place], [place] - [address] [place] [address] AT SEC OF [address]  [address] [facility] [address]  Phone: [phone] Fax: [phone]      CALL BACK INFO: Ok to leave response (including medical information) with family member or on answering machine  ROUTING: Patient's physician/staff        PCP: Qi Sanderson MD         INS: Payor: [employer] / Plan: CHOICE PLUS/2400 / Product Type: PPO MISC   PATIENT ADDRESS:  [address] [place] [address]  
Patient is status post vasectomy 1/31/20. Advised patient that there is no harm in waiting however, his insurance may put a restriction on how long these tests are covered post vasectomy. If patient wishes to wait, he should contact insurance to determine when end of coverage will be. Advised patient that Vas is not considered successful until he has gotten two negative semen analysis results. Patient verbalized understanding and had no further questions at this time.   
6 (moderate pain)

## 2024-10-16 NOTE — BRIEF OPERATIVE NOTE - OPERATION/FINDINGS
Standard 3 port laparoscopic appendectomy. Perforated tip appendicitis without abscess. Inflammatory adhesions dissected to mobilize base of appendix. Base of appendix taken with Endo EMIL 45 purple load. Hemostatic staple line. RLQ irrigated.

## 2024-10-16 NOTE — H&P ADULT - NSHPLABSRESULTS_GEN_ALL_CORE
CBC Basic (10-16 @ 07:41)  WBC: 14.17 Hgb: 12.9 Hct: 38.1 Plt: 188    Metabolic Panel (10-16 @ 07:42)  135  |  98  |  18  ----------------------------<  114  4.3   |  25  |  0.72  Ca: 9.2/Phos: x/Magnesium: x    Liver Chemistries (10-16 @ 07:42)  Total protein 7.1 | Albumin 4.2  TBili 0.9 | DBili x  AST 13 | ALT 15 | AlkPhos 49    Coagulation Studies (10-16 @ 07:41)  PT/INR: 13.1/1.14, aPTT: 29.0    Urinalysis (10-16 @ 08:11)  Physical: Color Yellow, Appearance Clear, Mucous x, Gross blood Moderate  Analytic: SG 1.021, pH 6.0  Cells: RBC 14, WBC 2  UTI markers: Bacteria Negative, Leukocyte esterase Negative, Nitrites Negative  Chemical: Glucose x, Ketones Negative, Protein 30, Urobilinogen 0.2, Bilirubin Negative    CT Abdomen and Pelvis w/ Oral Cont and w/ IV Cont (10-16 @ 10:22)  Suspicious for acute appendicitis. Right posterior abdominal wall lipomatous lesion.

## 2024-10-16 NOTE — ED PROVIDER NOTE - NSICDXPASTMEDICALHX_GEN_ALL_CORE_FT
PAST MEDICAL HISTORY:  BPH (benign prostatic hyperplasia)     Closed wedge compression fracture of L1 vertebra, initial encounter     HLD (hyperlipidemia)     IBS (irritable bowel syndrome)     Smoker

## 2024-10-16 NOTE — H&P ADULT - ASSESSMENT
Mr. Quick is a 69 year old man presenting with acute appendicitis. He is an active smoker but is otherwise grossly healthy without contraindications to surgery. He wishes to pursue definitive operative management.     Plan  - Admit to acute care surgery, Dr. Choi.   - NPO, maintenance crystalloid.   - Ceftriaxone and metronidazole pending OR.   - Low molecular weight heparin and SCD for DVT prophylaxis.   - Medication reconciliation completed.   - Added on and consented for laparoscopic appendectomy, possible open.     Case discussed with attending surgeon Dr. Melvin Choi.     Yvan Roman, PGY-2  Acute Care Surgery ([id number])

## 2024-10-16 NOTE — H&P ADULT - NSHPPHYSICALEXAM_GEN_ALL_CORE
T(C): 36.7 (16 Oct 2024 16:17), Max: 36.9 (16 Oct 2024 06:07)  HR: 73 (16 Oct 2024 16:17) (63 - 88)  BP: 99/63 (16 Oct 2024 16:17) (87/49 - 105/67)  RR: 19 (16 Oct 2024 16:17) (18 - 22)  SpO2: 100% (16 Oct 2024 16:17) (98% - 100%)    Height (cm): 172.7 (10-16)  Weight (kg): 55.3 (10-16)  BMI (kg/m2): 18.5 (10-16)  BSA (m2): 1.66 (10-16)    General: Resting comfortably in bed in no acute distress.   Neurologic: Alert, oriented, and appropriately responds to questions.   Psychiatric: Euthymic mood, calm affect, linear thought process, appropriate thought content.   Respiratory: Equal chest wall expansion bilaterally with no accessory muscle use, no tachypnea, and no grossly increased work of breathing on room air.   Cardiac: Regular rate and rhythm by palpation of peripheral pulse.   Abdomen: Soft and nondistended, mild right lower quadrant tenderness to palpation. No rebound tenderness or guarding is elicited. Well-healed left inguinal scar with palpable underlying mesh.

## 2024-10-17 ENCOUNTER — TRANSCRIPTION ENCOUNTER (OUTPATIENT)
Age: 69
End: 2024-10-17

## 2024-10-17 LAB
ALBUMIN SERPL ELPH-MCNC: 3.4 G/DL — SIGNIFICANT CHANGE UP (ref 3.3–5)
ALP SERPL-CCNC: 43 U/L — SIGNIFICANT CHANGE UP (ref 40–120)
ALT FLD-CCNC: 10 U/L — SIGNIFICANT CHANGE UP (ref 10–45)
ANION GAP SERPL CALC-SCNC: 10 MMOL/L — SIGNIFICANT CHANGE UP (ref 5–17)
ANION GAP SERPL CALC-SCNC: 12 MMOL/L — SIGNIFICANT CHANGE UP (ref 5–17)
AST SERPL-CCNC: 11 U/L — SIGNIFICANT CHANGE UP (ref 10–40)
BILIRUB SERPL-MCNC: 0.3 MG/DL — SIGNIFICANT CHANGE UP (ref 0.2–1.2)
BUN SERPL-MCNC: 14 MG/DL — SIGNIFICANT CHANGE UP (ref 7–23)
BUN SERPL-MCNC: 17 MG/DL — SIGNIFICANT CHANGE UP (ref 7–23)
CALCIUM SERPL-MCNC: 8 MG/DL — LOW (ref 8.4–10.5)
CALCIUM SERPL-MCNC: 8.6 MG/DL — SIGNIFICANT CHANGE UP (ref 8.4–10.5)
CHLORIDE SERPL-SCNC: 98 MMOL/L — SIGNIFICANT CHANGE UP (ref 96–108)
CHLORIDE SERPL-SCNC: 99 MMOL/L — SIGNIFICANT CHANGE UP (ref 96–108)
CO2 SERPL-SCNC: 24 MMOL/L — SIGNIFICANT CHANGE UP (ref 22–31)
CO2 SERPL-SCNC: 24 MMOL/L — SIGNIFICANT CHANGE UP (ref 22–31)
CREAT SERPL-MCNC: 0.67 MG/DL — SIGNIFICANT CHANGE UP (ref 0.5–1.3)
CREAT SERPL-MCNC: 0.69 MG/DL — SIGNIFICANT CHANGE UP (ref 0.5–1.3)
EGFR: 100 ML/MIN/1.73M2 — SIGNIFICANT CHANGE UP
EGFR: 101 ML/MIN/1.73M2 — SIGNIFICANT CHANGE UP
GAS PNL BLDV: SIGNIFICANT CHANGE UP
GLUCOSE SERPL-MCNC: 103 MG/DL — HIGH (ref 70–99)
GLUCOSE SERPL-MCNC: 135 MG/DL — HIGH (ref 70–99)
HCT VFR BLD CALC: 31.5 % — LOW (ref 39–50)
HCT VFR BLD CALC: 31.9 % — LOW (ref 39–50)
HCT VFR BLD CALC: 35.9 % — LOW (ref 39–50)
HGB BLD-MCNC: 10.6 G/DL — LOW (ref 13–17)
HGB BLD-MCNC: 11 G/DL — LOW (ref 13–17)
HGB BLD-MCNC: 12.1 G/DL — LOW (ref 13–17)
LACTATE SERPL-SCNC: 1.3 MMOL/L — SIGNIFICANT CHANGE UP (ref 0.5–2)
MAGNESIUM SERPL-MCNC: 1.8 MG/DL — SIGNIFICANT CHANGE UP (ref 1.6–2.6)
MAGNESIUM SERPL-MCNC: 1.9 MG/DL — SIGNIFICANT CHANGE UP (ref 1.6–2.6)
MCHC RBC-ENTMCNC: 31.8 PG — SIGNIFICANT CHANGE UP (ref 27–34)
MCHC RBC-ENTMCNC: 32.5 PG — SIGNIFICANT CHANGE UP (ref 27–34)
MCHC RBC-ENTMCNC: 33.2 GM/DL — SIGNIFICANT CHANGE UP (ref 32–36)
MCHC RBC-ENTMCNC: 33.2 PG — SIGNIFICANT CHANGE UP (ref 27–34)
MCHC RBC-ENTMCNC: 33.7 GM/DL — SIGNIFICANT CHANGE UP (ref 32–36)
MCHC RBC-ENTMCNC: 34.9 GM/DL — SIGNIFICANT CHANGE UP (ref 32–36)
MCV RBC AUTO: 95.2 FL — SIGNIFICANT CHANGE UP (ref 80–100)
MCV RBC AUTO: 95.8 FL — SIGNIFICANT CHANGE UP (ref 80–100)
MCV RBC AUTO: 96.5 FL — SIGNIFICANT CHANGE UP (ref 80–100)
NRBC # BLD: 0 /100 WBCS — SIGNIFICANT CHANGE UP (ref 0–0)
PHOSPHATE SERPL-MCNC: 2.6 MG/DL — SIGNIFICANT CHANGE UP (ref 2.5–4.5)
PHOSPHATE SERPL-MCNC: 3.5 MG/DL — SIGNIFICANT CHANGE UP (ref 2.5–4.5)
PLATELET # BLD AUTO: 161 K/UL — SIGNIFICANT CHANGE UP (ref 150–400)
PLATELET # BLD AUTO: 165 K/UL — SIGNIFICANT CHANGE UP (ref 150–400)
PLATELET # BLD AUTO: 187 K/UL — SIGNIFICANT CHANGE UP (ref 150–400)
POTASSIUM SERPL-MCNC: 4.2 MMOL/L — SIGNIFICANT CHANGE UP (ref 3.5–5.3)
POTASSIUM SERPL-MCNC: 4.5 MMOL/L — SIGNIFICANT CHANGE UP (ref 3.5–5.3)
POTASSIUM SERPL-SCNC: 4.2 MMOL/L — SIGNIFICANT CHANGE UP (ref 3.5–5.3)
POTASSIUM SERPL-SCNC: 4.5 MMOL/L — SIGNIFICANT CHANGE UP (ref 3.5–5.3)
PROT SERPL-MCNC: 5.8 G/DL — LOW (ref 6–8.3)
RBC # BLD: 3.31 M/UL — LOW (ref 4.2–5.8)
RBC # BLD: 3.33 M/UL — LOW (ref 4.2–5.8)
RBC # BLD: 3.72 M/UL — LOW (ref 4.2–5.8)
RBC # FLD: 11.7 % — SIGNIFICANT CHANGE UP (ref 10.3–14.5)
RBC # FLD: 11.7 % — SIGNIFICANT CHANGE UP (ref 10.3–14.5)
RBC # FLD: 11.8 % — SIGNIFICANT CHANGE UP (ref 10.3–14.5)
SODIUM SERPL-SCNC: 132 MMOL/L — LOW (ref 135–145)
SODIUM SERPL-SCNC: 135 MMOL/L — SIGNIFICANT CHANGE UP (ref 135–145)
WBC # BLD: 11.61 K/UL — HIGH (ref 3.8–10.5)
WBC # BLD: 7.84 K/UL — SIGNIFICANT CHANGE UP (ref 3.8–10.5)
WBC # BLD: 9.15 K/UL — SIGNIFICANT CHANGE UP (ref 3.8–10.5)
WBC # FLD AUTO: 11.61 K/UL — HIGH (ref 3.8–10.5)
WBC # FLD AUTO: 7.84 K/UL — SIGNIFICANT CHANGE UP (ref 3.8–10.5)
WBC # FLD AUTO: 9.15 K/UL — SIGNIFICANT CHANGE UP (ref 3.8–10.5)

## 2024-10-17 PROCEDURE — 99232 SBSQ HOSP IP/OBS MODERATE 35: CPT | Mod: GC

## 2024-10-17 PROCEDURE — 74177 CT ABD & PELVIS W/CONTRAST: CPT | Mod: 26

## 2024-10-17 RX ORDER — MAGNESIUM HYDROXIDE 400 MG/5ML
30 SUSPENSION, ORAL (FINAL DOSE FORM) ORAL DAILY
Refills: 0 | Status: DISCONTINUED | OUTPATIENT
Start: 2024-10-17 | End: 2024-10-18

## 2024-10-17 RX ORDER — MAGNESIUM SULFATE 500 MG/ML
1 VIAL (ML) INJECTION ONCE
Refills: 0 | Status: COMPLETED | OUTPATIENT
Start: 2024-10-17 | End: 2024-10-17

## 2024-10-17 RX ORDER — SODIUM PHOSPHATE IN D5W 15MMOL/250
15 PLASTIC BAG, INJECTION (ML) INTRAVENOUS ONCE
Refills: 0 | Status: COMPLETED | OUTPATIENT
Start: 2024-10-17 | End: 2024-10-17

## 2024-10-17 RX ORDER — SODIUM CHLORIDE IRRIG SOLUTION 0.9 %
1000 SOLUTION, IRRIGATION IRRIGATION ONCE
Refills: 0 | Status: COMPLETED | OUTPATIENT
Start: 2024-10-17 | End: 2024-10-17

## 2024-10-17 RX ORDER — CHLORHEXIDINE GLUCONATE ORAL RINSE 1.2 MG/ML
1 SOLUTION DENTAL DAILY
Refills: 0 | Status: DISCONTINUED | OUTPATIENT
Start: 2024-10-17 | End: 2024-10-17

## 2024-10-17 RX ORDER — CHLORHEXIDINE GLUCONATE ORAL RINSE 1.2 MG/ML
1 SOLUTION DENTAL
Refills: 0 | Status: DISCONTINUED | OUTPATIENT
Start: 2024-10-17 | End: 2024-10-18

## 2024-10-17 RX ORDER — SENNOSIDES 8.6 MG
2 TABLET ORAL AT BEDTIME
Refills: 0 | Status: DISCONTINUED | OUTPATIENT
Start: 2024-10-17 | End: 2024-10-18

## 2024-10-17 RX ORDER — HYDROMORPHONE HYDROCHLORIDE 1 MG/ML
0.5 INJECTION, SOLUTION INTRAMUSCULAR; INTRAVENOUS; SUBCUTANEOUS
Refills: 0 | Status: DISCONTINUED | OUTPATIENT
Start: 2024-10-17 | End: 2024-10-17

## 2024-10-17 RX ORDER — SODIUM CHLORIDE IRRIG SOLUTION 0.9 %
1000 SOLUTION, IRRIGATION IRRIGATION
Refills: 0 | Status: DISCONTINUED | OUTPATIENT
Start: 2024-10-17 | End: 2024-10-18

## 2024-10-17 RX ORDER — FLUTICASONE PROPION/SALMETEROL 100-50 MCG
1 BLISTER, WITH INHALATION DEVICE INHALATION
Refills: 0 | Status: DISCONTINUED | OUTPATIENT
Start: 2024-10-17 | End: 2024-10-18

## 2024-10-17 RX ORDER — ONDANSETRON HCL/PF 4 MG/2 ML
4 VIAL (ML) INJECTION ONCE
Refills: 0 | Status: DISCONTINUED | OUTPATIENT
Start: 2024-10-17 | End: 2024-10-17

## 2024-10-17 RX ADMIN — Medication 2000 MILLILITER(S): at 09:05

## 2024-10-17 RX ADMIN — OXYCODONE HYDROCHLORIDE 5 MILLIGRAM(S): 30 TABLET, FILM COATED, EXTENDED RELEASE ORAL at 05:15

## 2024-10-17 RX ADMIN — Medication 400 MILLIGRAM(S): at 21:25

## 2024-10-17 RX ADMIN — ROSUVASTATIN CALCIUM 40 MILLIGRAM(S): 20 TABLET, COATED ORAL at 21:49

## 2024-10-17 RX ADMIN — CHLORHEXIDINE GLUCONATE ORAL RINSE 1 APPLICATION(S): 1.2 SOLUTION DENTAL at 21:49

## 2024-10-17 RX ADMIN — Medication 255 MILLIMOLE(S): at 20:17

## 2024-10-17 RX ADMIN — OXYCODONE HYDROCHLORIDE 5 MILLIGRAM(S): 30 TABLET, FILM COATED, EXTENDED RELEASE ORAL at 00:48

## 2024-10-17 RX ADMIN — Medication 400 MILLIGRAM(S): at 11:00

## 2024-10-17 RX ADMIN — Medication 1000 MILLIGRAM(S): at 23:37

## 2024-10-17 RX ADMIN — Medication 400 MILLIGRAM(S): at 10:21

## 2024-10-17 RX ADMIN — Medication 1000 MILLIGRAM(S): at 17:10

## 2024-10-17 RX ADMIN — OXYCODONE HYDROCHLORIDE 5 MILLIGRAM(S): 30 TABLET, FILM COATED, EXTENDED RELEASE ORAL at 21:25

## 2024-10-17 RX ADMIN — OXYCODONE HYDROCHLORIDE 5 MILLIGRAM(S): 30 TABLET, FILM COATED, EXTENDED RELEASE ORAL at 06:15

## 2024-10-17 RX ADMIN — Medication 100 GRAM(S): at 17:29

## 2024-10-17 RX ADMIN — EZETIMIBE 10 MILLIGRAM(S): 10 TABLET ORAL at 12:38

## 2024-10-17 RX ADMIN — Medication 2 TABLET(S): at 21:49

## 2024-10-17 RX ADMIN — Medication 1000 MILLIGRAM(S): at 06:15

## 2024-10-17 RX ADMIN — FINASTERIDE 5 MILLIGRAM(S): 5 TABLET, FILM COATED ORAL at 12:38

## 2024-10-17 RX ADMIN — OXYCODONE HYDROCHLORIDE 5 MILLIGRAM(S): 30 TABLET, FILM COATED, EXTENDED RELEASE ORAL at 01:08

## 2024-10-17 RX ADMIN — Medication 75 MILLILITER(S): at 12:37

## 2024-10-17 RX ADMIN — Medication 100 MILLIGRAM(S): at 10:18

## 2024-10-17 RX ADMIN — Medication 1000 MILLIGRAM(S): at 05:15

## 2024-10-17 RX ADMIN — Medication 2000 MILLILITER(S): at 07:25

## 2024-10-17 RX ADMIN — Medication 81 MILLIGRAM(S): at 12:38

## 2024-10-17 RX ADMIN — Medication 30 MILLILITER(S): at 21:48

## 2024-10-17 RX ADMIN — Medication 1000 MILLIGRAM(S): at 12:38

## 2024-10-17 RX ADMIN — Medication 1 DOSE(S): at 17:11

## 2024-10-17 RX ADMIN — CHLORHEXIDINE GLUCONATE ORAL RINSE 1 APPLICATION(S): 1.2 SOLUTION DENTAL at 12:38

## 2024-10-17 RX ADMIN — Medication 0.4 MILLIGRAM(S): at 03:34

## 2024-10-17 RX ADMIN — Medication 400 MILLIGRAM(S): at 02:53

## 2024-10-17 RX ADMIN — Medication 400 MILLIGRAM(S): at 03:48

## 2024-10-17 RX ADMIN — Medication 100 MILLIGRAM(S): at 05:15

## 2024-10-17 RX ADMIN — OXYCODONE HYDROCHLORIDE 2.5 MILLIGRAM(S): 30 TABLET, FILM COATED, EXTENDED RELEASE ORAL at 22:45

## 2024-10-17 RX ADMIN — Medication 0.4 MILLIGRAM(S): at 21:48

## 2024-10-17 RX ADMIN — ENOXAPARIN SODIUM 40 MILLIGRAM(S): 150 INJECTION SUBCUTANEOUS at 10:21

## 2024-10-17 RX ADMIN — Medication 400 MILLIGRAM(S): at 20:20

## 2024-10-17 RX ADMIN — Medication 400 MILLIGRAM(S): at 13:55

## 2024-10-17 RX ADMIN — OXYCODONE HYDROCHLORIDE 5 MILLIGRAM(S): 30 TABLET, FILM COATED, EXTENDED RELEASE ORAL at 20:27

## 2024-10-17 RX ADMIN — OXYCODONE HYDROCHLORIDE 2.5 MILLIGRAM(S): 30 TABLET, FILM COATED, EXTENDED RELEASE ORAL at 21:51

## 2024-10-17 NOTE — PROVIDER CONTACT NOTE (OTHER) - SITUATION
Pt s/p lap appe, found to be gangrenous. Receiving iv abx
Pt A&OX4, VSS escept BP 88/46. Pt denies dizziness/lightheadedness/SOB/CP. Pt medicated for pain @ 5am with PRN oxycodone 5 mg and standing tylenol.

## 2024-10-17 NOTE — CONSULT NOTE ADULT - SUBJECTIVE AND OBJECTIVE BOX
HISTORY OF PRESENT ILLNESS:  KAL COWAN is a 69y M PMH repair an incarcerated left inguinal hernia with mesh placement (2022), HLD, and active smoking (8 cigarettes per day) presenting with two days of RLQ pain and constipation. In ED, WBC elevated and CT concerning for acute uncomplicated appendicitis. Taken to OR for laparoscopic appendectomy. Surgery was uncomplicated; however, post operatively patient was hypotensive. Given 2 L bolus on surgical floor without improvement of hypotension. SICU consulted for persistent hypotension, w possible initiation of vasopressors.       --------------------------------------------------------------------------------------  PAST MEDICAL HISTORY:   HLD (hyperlipidemia)    IBS (irritable bowel syndrome)    BPH (benign prostatic hyperplasia)    Closed wedge compression fracture of L1 vertebra, initial encounter    Smoker        PAST SURGICAL HISTORY:   History of laparoscopic cholecystectomy    Squamous cell skin cancer, face        FAMILY HISTORY:   Family history of bone cancer (Mother)        HOME MEDICATIONS:  Home Medications:  alfuzosin 10 mg oral tablet, extended release: 1 tab(s) orally once a day (16 Oct 2024 19:46)  cyclobenzaprine 5 mg oral tablet: 1 tab(s) orally 3 times a day, As Needed (16 Oct 2024 19:46)  diazePAM 5 mg oral tablet: 1 tab(s) orally every 8 hours, As Needed (16 Oct 2024 19:46)  ezetimibe 10 mg oral tablet: 1 tab(s) orally once a day (16 Oct 2024 19:46)  finasteride 5 mg oral tablet: 1 tab(s) orally once a day (16 Oct 2024 19:46)  Linzess 290 mcg oral capsule: 1 cap(s) orally once a day (16 Oct 2024 19:46)  Low Dose ASA 81 mg oral tablet: orally once a day (16 Oct 2024 19:46)  rosuvastatin 40 mg oral tablet: 1 tab(s) orally once a day (16 Oct 2024 19:46)  Vitamin D2 50 mcg (2000 intl units) oral capsule: 1 cap(s) orally once a day (16 Oct 2024 19:46)      ALLERGIES:   No Known Allergies      --------------------------------------------------------------------------------------    VITAL SIGNS:  ICU Vital Signs Last 24 Hrs  T(C): 36.8 (17 Oct 2024 08:36), Max: 37.4 (17 Oct 2024 06:00)  T(F): 98.3 (17 Oct 2024 08:36), Max: 99.3 (17 Oct 2024 06:00)  HR: 57 (17 Oct 2024 10:45) (55 - 78)  BP: 80/48 (17 Oct 2024 10:45) (74/38 - 122/57)  BP(mean): 72 (17 Oct 2024 03:00) (68 - 83)  ABP: --  ABP(mean): --  RR: 18 (17 Oct 2024 08:36) (15 - 22)  SpO2: 97% (17 Oct 2024 08:36) (94% - 100%)    O2 Parameters below as of 17 Oct 2024 08:36  Patient On (Oxygen Delivery Method): room air            PHYSICAL EXAM:    NEURO  Exam: A&Ox4    RESPIRATORY  Exam: nonlabored breathing on room air  Mechanical Ventilation:     CARDIOVASCULAR  Exam: RRR, hypotensive, warm and well perfused  Cardiac Rhythm: sinus    GI/NUTRITION  Exam: soft, appropriate incisional tenderness, nondistended  Diet: regular    GENITOURINARY/RENAL  Exam: voiding appropriately  [ ] Vasquez catheter, indication: urine output monitoring in critically ill patient    HEMATOLOGIC  VTE Prophylaxis: Lovenox    ENDOCRINE  Glucose:  CAPILLARY BLOOD GLUCOSE            PATIENT CARE ACCESS DEVICES:  [ x] Peripheral IV  [ ] Central Venous Line	[ ] R	[ ] L	[ ] IJ	[ ] Fem	[ ] SC	Placed:   [ ] Arterial Line		[ ] R	[ ] L	[ ] Fem	[ ] Rad	[ ] Ax	Placed:   [ ] PICC:					[ ] Mediport  [ ] Urinary Catheter, Date Placed:   [x] Necessity of urinary, arterial, and venous catheters discussed      I/Os:  I&O's Summary    16 Oct 2024 07:01  -  17 Oct 2024 07:00  --------------------------------------------------------  IN: 50 mL / OUT: 150 mL / NET: -100 mL    17 Oct 2024 07:01  -  17 Oct 2024 15:27  --------------------------------------------------------  IN: 280 mL / OUT: 0 mL / NET: 280 mL        --------------------------------------------------------------------------------------  MEDICATIONS:   Neurologic Medications  acetaminophen     Tablet .. 1000 milliGRAM(s) Oral every 6 hours  ibuprofen  Tablet. 400 milliGRAM(s) Oral every 6 hours  oxyCODONE    IR 2.5 milliGRAM(s) Oral every 4 hours PRN Moderate Pain (4 - 6)  oxyCODONE    IR 5 milliGRAM(s) Oral every 4 hours PRN Severe Pain (7 - 10)    Respiratory Medications  fluticasone propionate/ salmeterol 500-50 MICROgram(s) Diskus 1 Dose(s) Inhalation two times a day    Cardiovascular Medications    Gastrointestinal Medications  multiple electrolytes Injection Type 1 1000 milliLiter(s) IV Continuous <Continuous>    Genitourinary Medications  tamsulosin 0.4 milliGRAM(s) Oral at bedtime    Hematologic/Oncologic Medications  aspirin enteric coated 81 milliGRAM(s) Oral daily  enoxaparin Injectable 40 milliGRAM(s) SubCutaneous every 24 hours    Antimicrobial/Immunologic Medications    Endocrine/Metabolic Medications  ezetimibe 10 milliGRAM(s) Oral daily  finasteride 5 milliGRAM(s) Oral daily  rosuvastatin 40 milliGRAM(s) Oral at bedtime    Topical/Other Medications  chlorhexidine 2% Cloths 1 Application(s) Topical daily      --------------------------------------------------------------------------------------  LABS:                         10.6   9.15  )-----------( 161      ( 17 Oct 2024 11:36 )             31.9   10-17    135  |  99  |  14  ----------------------------<  103[H]  4.5   |  24  |  0.69    Ca    8.6      17 Oct 2024 07:21  Phos  3.5     10-17  Mg     1.8     10-17    TPro  7.1  /  Alb  4.2  /  TBili  0.9  /  DBili  x   /  AST  13  /  ALT  15  /  AlkPhos  49  10-16  Urinalysis Basic - ( 17 Oct 2024 07:21 )    Color: x / Appearance: x / SG: x / pH: x  Gluc: 103 mg/dL / Ketone: x  / Bili: x / Urobili: x   Blood: x / Protein: x / Nitrite: x   Leuk Esterase: x / RBC: x / WBC x   Sq Epi: x / Non Sq Epi: x / Bacteria: x    PT/INR - ( 16 Oct 2024 07:41 )   PT: 13.1 sec;   INR: 1.14 ratio         PTT - ( 16 Oct 2024 07:41 )  PTT:29.0 sec

## 2024-10-17 NOTE — DISCHARGE NOTE PROVIDER - NSDCMRMEDTOKEN_GEN_ALL_CORE_FT
alfuzosin 10 mg oral tablet, extended release: 1 tab(s) orally once a day  cyclobenzaprine 5 mg oral tablet: 1 tab(s) orally 3 times a day, As Needed  diazePAM 5 mg oral tablet: 1 tab(s) orally every 8 hours, As Needed  ezetimibe 10 mg oral tablet: 1 tab(s) orally once a day  finasteride 5 mg oral tablet: 1 tab(s) orally once a day  Linzess 290 mcg oral capsule: 1 cap(s) orally once a day  Low Dose ASA 81 mg oral tablet: orally once a day  rosuvastatin 40 mg oral tablet: 1 tab(s) orally once a day  Vitamin D2 50 mcg (2000 intl units) oral capsule: 1 cap(s) orally once a day   acetaminophen 500 mg oral tablet: 2 tab(s) orally every 6 hours  alfuzosin 10 mg oral tablet, extended release: 1 tab(s) orally once a day  cyclobenzaprine 5 mg oral tablet: 1 tab(s) orally 3 times a day, As Needed  diazePAM 5 mg oral tablet: 1 tab(s) orally every 8 hours, As Needed  ezetimibe 10 mg oral tablet: 1 tab(s) orally once a day  finasteride 5 mg oral tablet: 1 tab(s) orally once a day  ibuprofen 400 mg oral tablet: 1 tab(s) orally every 6 hours  Linzess 290 mcg oral capsule: 1 cap(s) orally once a day  Low Dose ASA 81 mg oral tablet: orally once a day  oxyCODONE 5 mg oral tablet: 1 tab(s) orally every 6 hours as needed for  severe pain MDD: 4 tabs daily  rosuvastatin 40 mg oral tablet: 1 tab(s) orally once a day  Vitamin D2 50 mcg (2000 intl units) oral capsule: 1 cap(s) orally once a day

## 2024-10-17 NOTE — PROVIDER CONTACT NOTE (OTHER) - ACTION/TREATMENT ORDERED:
MD notified. As per MD, will order fluids and reassess BP. Morning labs to be sent. No further nursing interventions at this time.
Ct abd/pelvis, chest. Repeat cbc, lactate. Tx SICU.

## 2024-10-17 NOTE — DISCHARGE NOTE PROVIDER - NSDCCPTREATMENT_GEN_ALL_CORE_FT
PRINCIPAL PROCEDURE  Procedure: Laparoscopic appendectomy  Findings and Treatment: WOUND CARE: Steri-strips have been applied to your incisions.  Do not remove these.  They will fall off as they get wet; in approximately 7-10 days.  PAIN MEDICATION: Please take tylenol every 6 hours, and stagger with ibuprofen every 6 hours. This will allow you to alternate medications for more consistent pain control. For example: take a dose of tylenol at 8 am, then take a dose of ibuprofen at 11 am, then take a dose of tylenol at 2pm, and continue as needed.  You may take Tylenol for pain. Use as directed. The maximum dose of Tylenol for 24 hours is 4000 mg. Please do not exceed that amount.  A prescription for oxycodone was sent to the pharmacy to be used in cases of severe pain.  BATHING: Please do not submerge wound underwater. Do not take a bath. You may shower and/or sponge bathe.  ACTIVITY: No heavy lifting or straining; do not lift anything greater than 10 pounds. Otherwise, you may return to your usual level of physical activity. If you are taking narcotic pain medication (such as Percocet), do NOT drive a car, operate machinery or make important decisions.  DIET: Return to your usual diet.  NOTIFY YOUR SURGEON IF: You have any bleeding that does not stop, any pus draining from your wound, any fever (over 100.4 F) or chills, persistent nausea/vomiting, persistent diarrhea, or if your pain is not controlled on your discharge pain medications.  FOLLOW-UP:  1. Please call to make a follow-up appointment within 1-2 weeks of discharge with Dr. Choi or one of his partners.   2. Please follow up with your primary care physician in one week regarding your hospitalization.

## 2024-10-17 NOTE — CHART NOTE - NSCHARTNOTEFT_GEN_A_CORE
General Surgery Post op Check    Pt seen and examined without complaints. Pain is controlled. Denies SOB/CP/N/V.     Vital Signs Last 24 Hrs  T(C): 37 (17 Oct 2024 00:00), Max: 37 (16 Oct 2024 23:35)  T(F): 98.6 (17 Oct 2024 00:00), Max: 98.6 (16 Oct 2024 23:35)  HR: 55 (17 Oct 2024 02:00) (55 - 88)  BP: 102/58 (17 Oct 2024 02:00) (87/49 - 122/57)  BP(mean): 78 (17 Oct 2024 02:00) (61 - 83)  RR: 16 (17 Oct 2024 02:00) (15 - 22)  SpO2: 97% (17 Oct 2024 02:00) (94% - 100%)    Parameters below as of 17 Oct 2024 02:00  Patient On (Oxygen Delivery Method): room air        I&O's Summary    16 Oct 2024 07:01  -  17 Oct 2024 03:30  --------------------------------------------------------  IN: 50 mL / OUT: 0 mL / NET: 50 mL        Physical Exam  Gen: NAD, A&Ox3  Pulm: No respiratory distress  CV: RRR, no JVD  Abd: Soft, slightly Tender, ND, port incisions C/D/I covered with dressings.       Assessment: 69y Male s/p laparoscopic appendectomy     Plan:  -ASA, Lovenox   -ABx  -Strict I&O's  -Analgesia and antiemetics as needed  -Diet: reg  -Monitor overnight    Trauma/ACS  [id number]

## 2024-10-17 NOTE — DISCHARGE NOTE PROVIDER - HOSPITAL COURSE
HPI:  Mr. Quick is a 69 year old man with a pertinent history of prior emergency surgery to repair an incarcerated left inguinal hernia with mesh placement (2022), hyperlipidemia, and active smoking (8 cigarettes per day) presenting with two days of right lower quadrant pain and constipation. He reports that the pain began abruptly and has not been associated with fevers, chills, nausea or vomiting. His last colonoscopy was 2-3 years ago and reportedly unremarkable, and he is otherwise up-to-date on health maintenance. He is able to walk up two flights of stairs without becoming short of breath and takes no antithrombotic medications. In the ED he is hemodynamically stable and in no acute distress with laboratory studies notable only for leukocytosis (14.17); remainder of basic metabolic panel, complete blood count and liver chemistries unremarkable. Cross-sectional imaging demonstrates uncomplicated acute appendicitis.   (16 Oct 2024 19:40)    Patient admitted to the acute care surgery service. Patient started on IV antibiotics. Taken to the operating room for a laparoscopic appendectomy. Patient had perforated tip appendicitis. Patient tolerated the procedure well and was transferred from the PACU to the surgical floor. Patient had post operative hypotension that was managed with IV fluids.    HPI:  Mr. Quick is a 69 year old man with a pertinent history of prior emergency surgery to repair an incarcerated left inguinal hernia with mesh placement (2022), hyperlipidemia, and active smoking (8 cigarettes per day) presenting with two days of right lower quadrant pain and constipation. He reports that the pain began abruptly and has not been associated with fevers, chills, nausea or vomiting. His last colonoscopy was 2-3 years ago and reportedly unremarkable, and he is otherwise up-to-date on health maintenance. He is able to walk up two flights of stairs without becoming short of breath and takes no antithrombotic medications. In the ED he is hemodynamically stable and in no acute distress with laboratory studies notable only for leukocytosis (14.17); remainder of basic metabolic panel, complete blood count and liver chemistries unremarkable. Cross-sectional imaging demonstrates uncomplicated acute appendicitis.    Patient admitted to the acute care surgery service. Patient started on IV antibiotics. Taken to the operating room for a laparoscopic appendectomy on 10/16/24. Patient had perforated tip appendicitis without abscess. Patient tolerated the procedure well and was transferred from the PACU to the surgical floor. Patient had post operative hypotension that was managed with IV fluids, pt was transferred to the SICU after continuing to be persistently hypotensive despite fluid resuscitation. CT angio of abdomen did not show findings consistent with hemorrhage. Pt's hypotension resolved without any interventions or vasopressor requirements. On the day of discharge, pt feeling well, VSS, afebrile, labs WNL, ambulating, voiding, tolerating diet.   Occupational therapy evaluated the pt while admitted and recommended no needs.  Pt should follow up with Dr. Choi or one of his associates in 1-2 weeks after discharge.    HPI:  Mr. Quick is a 69 year old man with a pertinent history of prior emergency surgery to repair an incarcerated left inguinal hernia with mesh placement (2022), hyperlipidemia, and active smoking (8 cigarettes per day) presenting with two days of right lower quadrant pain and constipation. He reports that the pain began abruptly and has not been associated with fevers, chills, nausea or vomiting. His last colonoscopy was 2-3 years ago and reportedly unremarkable, and he is otherwise up-to-date on health maintenance. He is able to walk up two flights of stairs without becoming short of breath and takes no antithrombotic medications. In the ED he is hemodynamically stable and in no acute distress with laboratory studies notable only for leukocytosis (14.17); remainder of basic metabolic panel, complete blood count and liver chemistries unremarkable. Cross-sectional imaging demonstrates uncomplicated acute appendicitis.    Patient admitted to the acute care surgery service. Patient started on IV antibiotics. Taken to the operating room for a laparoscopic appendectomy on 10/16/24. Patient had perforated tip appendicitis without abscess. Patient tolerated the procedure well and was transferred from the PACU to the surgical floor. Patient had post operative hypotension that was managed with IV fluids, pt was transferred to the SICU after continuing to be persistently hypotensive despite fluid resuscitation. CT angio of abdomen did not show findings consistent with hemorrhage. Pt's hypotension resolved without any interventions or vasopressor requirements. On the day of discharge, pt feeling well, VSS, afebrile, labs WNL, ambulating, voiding, tolerating diet.   OT and PT evaluated the pt while admitted and recommended no needs.  Pt should follow up with Dr. Choi or one of his associates in 1-2 weeks after discharge.

## 2024-10-17 NOTE — PROGRESS NOTE ADULT - ASSESSMENT
69y Male s/p laparoscopic appendectomy , POD 1    Plan:  -ASA, Lovenox   -ABx  -Strict I&O's  -Analgesia and antiemetics as needed  -Diet: reg  -Monitor overnight    Trauma/ACS  [id number].

## 2024-10-17 NOTE — DISCHARGE NOTE PROVIDER - CARE PROVIDER_API CALL
Melvin Choi [facility]  [address] [place] [address] [address]  [place] [address]  Phone: [phone]  Fax: [phone]  Follow Up Time: 2 weeks

## 2024-10-17 NOTE — DISCHARGE NOTE PROVIDER - NSDCFUSCHEDAPPT_GEN_ALL_CORE_FT
Kenny Avilez  [facility]  PULMMED [address] [facility]  Scheduled Appointment: 10/29/2024    Jacques Brooks  [facility]  CARDIOLOGY [address] [facility]   Scheduled Appointment: 12/02/2024    [facility]  CARDIOLOGY [address] [facility]   Scheduled Appointment: 12/02/2024    [facility]  CARDIOLOGY [address] [facility]   Scheduled Appointment: 12/02/2024

## 2024-10-17 NOTE — PROGRESS NOTE ADULT - ATTENDING COMMENTS
patient noted to be persistently hypotensive after 2 liters fluid  arranged for CT angio of abdomen which did not show findings consistent with hemorrhage  suspect sepsis - transferred to the SICU

## 2024-10-17 NOTE — PACU DISCHARGE NOTE - NAUSEA/VOMITING:
Generally GI if hematology thought the anemia to be due to cirrhosis, she has an appt 6/4 with dr castillo   None

## 2024-10-17 NOTE — PROVIDER CONTACT NOTE (MEDICATION) - ACTION/TREATMENT ORDERED:
Per MD, Ok to administer Lovenox as pt doesn't appear to be in hemorrhagic shock. May administer Motrin and Tylenol for pain.

## 2024-10-17 NOTE — CONSULT NOTE ADULT - ATTENDING COMMENTS
Appendicitis with postoperative hypotension. Lactate normal. CT without abscess or hemorrhage. h/h stable. WBC improving. Will observe. Given 2 liters of IVF by primary team with improvement.

## 2024-10-17 NOTE — PROVIDER CONTACT NOTE (OTHER) - ASSESSMENT
Patient found to be persistently hypotensive during PACU vital check this am. Pt s/p 1L IVF bolus. Pt continues to remain hypotensive, remains asymptomatic. RN paged trauma service, Dr Garcia, trauma at bedside, attending physician Dr Choi en route. Manual BP found to be systolic in 70s (see BPs charted). Per Dr Kirby, initiate additional 1L bolus, encourage patient to ambulate, may be discharged later this afternoon if responding to iv bolus and repeat labs. Bolus administered, repeat BP persistently in 80s (see BPs charted), RN phoned Dr Garcia to update, trauma team to see patient. Shortly after, Dr Neely at bedside, expresses concern for internal bleed v sepsis, requesting CT abd/pelvis and chest to r/o bleed, repeat labs sent. Pt to be transferred to SICU.
Pt A&OX4, VSS escept BP 88/46. Pt denies dizziness/lightheadedness/SOB/CP.

## 2024-10-18 ENCOUNTER — TRANSCRIPTION ENCOUNTER (OUTPATIENT)
Age: 69
End: 2024-10-18

## 2024-10-18 VITALS
OXYGEN SATURATION: 97 % | TEMPERATURE: 98 F | RESPIRATION RATE: 23 BRPM | SYSTOLIC BLOOD PRESSURE: 99 MMHG | DIASTOLIC BLOOD PRESSURE: 55 MMHG | HEART RATE: 70 BPM

## 2024-10-18 DIAGNOSIS — E78.5 HYPERLIPIDEMIA, UNSPECIFIED: ICD-10-CM

## 2024-10-18 DIAGNOSIS — F17.200 NICOTINE DEPENDENCE, UNSPECIFIED, UNCOMPLICATED: ICD-10-CM

## 2024-10-18 DIAGNOSIS — I95.9 HYPOTENSION, UNSPECIFIED: ICD-10-CM

## 2024-10-18 LAB
ALBUMIN SERPL ELPH-MCNC: 3.3 G/DL — SIGNIFICANT CHANGE UP (ref 3.3–5)
ALP SERPL-CCNC: 41 U/L — SIGNIFICANT CHANGE UP (ref 40–120)
ALT FLD-CCNC: 9 U/L — LOW (ref 10–45)
ANION GAP SERPL CALC-SCNC: 8 MMOL/L — SIGNIFICANT CHANGE UP (ref 5–17)
AST SERPL-CCNC: 11 U/L — SIGNIFICANT CHANGE UP (ref 10–40)
BILIRUB SERPL-MCNC: 0.3 MG/DL — SIGNIFICANT CHANGE UP (ref 0.2–1.2)
BUN SERPL-MCNC: 17 MG/DL — SIGNIFICANT CHANGE UP (ref 7–23)
CALCIUM SERPL-MCNC: 8.1 MG/DL — LOW (ref 8.4–10.5)
CHLORIDE SERPL-SCNC: 98 MMOL/L — SIGNIFICANT CHANGE UP (ref 96–108)
CO2 SERPL-SCNC: 26 MMOL/L — SIGNIFICANT CHANGE UP (ref 22–31)
CREAT SERPL-MCNC: 0.62 MG/DL — SIGNIFICANT CHANGE UP (ref 0.5–1.3)
EGFR: 103 ML/MIN/1.73M2 — SIGNIFICANT CHANGE UP
GAS PNL BLDV: SIGNIFICANT CHANGE UP
GLUCOSE SERPL-MCNC: 128 MG/DL — HIGH (ref 70–99)
HCT VFR BLD CALC: 29.7 % — LOW (ref 39–50)
HGB BLD-MCNC: 10.1 G/DL — LOW (ref 13–17)
MAGNESIUM SERPL-MCNC: 2.1 MG/DL — SIGNIFICANT CHANGE UP (ref 1.6–2.6)
MCHC RBC-ENTMCNC: 32.4 PG — SIGNIFICANT CHANGE UP (ref 27–34)
MCHC RBC-ENTMCNC: 34 GM/DL — SIGNIFICANT CHANGE UP (ref 32–36)
MCV RBC AUTO: 95.2 FL — SIGNIFICANT CHANGE UP (ref 80–100)
NRBC # BLD: 0 /100 WBCS — SIGNIFICANT CHANGE UP (ref 0–0)
PHOSPHATE SERPL-MCNC: 2.9 MG/DL — SIGNIFICANT CHANGE UP (ref 2.5–4.5)
PLATELET # BLD AUTO: 155 K/UL — SIGNIFICANT CHANGE UP (ref 150–400)
POTASSIUM SERPL-MCNC: 3.7 MMOL/L — SIGNIFICANT CHANGE UP (ref 3.5–5.3)
POTASSIUM SERPL-SCNC: 3.7 MMOL/L — SIGNIFICANT CHANGE UP (ref 3.5–5.3)
PROT SERPL-MCNC: 5.7 G/DL — LOW (ref 6–8.3)
RBC # BLD: 3.12 M/UL — LOW (ref 4.2–5.8)
RBC # FLD: 11.9 % — SIGNIFICANT CHANGE UP (ref 10.3–14.5)
SODIUM SERPL-SCNC: 132 MMOL/L — LOW (ref 135–145)
WBC # BLD: 7.12 K/UL — SIGNIFICANT CHANGE UP (ref 3.8–10.5)
WBC # FLD AUTO: 7.12 K/UL — SIGNIFICANT CHANGE UP (ref 3.8–10.5)

## 2024-10-18 PROCEDURE — 85610 PROTHROMBIN TIME: CPT

## 2024-10-18 PROCEDURE — 80048 BASIC METABOLIC PNL TOTAL CA: CPT

## 2024-10-18 PROCEDURE — 88304 TISSUE EXAM BY PATHOLOGIST: CPT

## 2024-10-18 PROCEDURE — 82330 ASSAY OF CALCIUM: CPT

## 2024-10-18 PROCEDURE — 87086 URINE CULTURE/COLONY COUNT: CPT

## 2024-10-18 PROCEDURE — 85014 HEMATOCRIT: CPT

## 2024-10-18 PROCEDURE — 86901 BLOOD TYPING SEROLOGIC RH(D): CPT

## 2024-10-18 PROCEDURE — 81001 URINALYSIS AUTO W/SCOPE: CPT

## 2024-10-18 PROCEDURE — 86900 BLOOD TYPING SEROLOGIC ABO: CPT

## 2024-10-18 PROCEDURE — C1889: CPT

## 2024-10-18 PROCEDURE — 94640 AIRWAY INHALATION TREATMENT: CPT

## 2024-10-18 PROCEDURE — 82803 BLOOD GASES ANY COMBINATION: CPT

## 2024-10-18 PROCEDURE — 96365 THER/PROPH/DIAG IV INF INIT: CPT

## 2024-10-18 PROCEDURE — 85018 HEMOGLOBIN: CPT

## 2024-10-18 PROCEDURE — 83690 ASSAY OF LIPASE: CPT

## 2024-10-18 PROCEDURE — 87186 SC STD MICRODIL/AGAR DIL: CPT

## 2024-10-18 PROCEDURE — 99285 EMERGENCY DEPT VISIT HI MDM: CPT | Mod: 25

## 2024-10-18 PROCEDURE — 87077 CULTURE AEROBIC IDENTIFY: CPT

## 2024-10-18 PROCEDURE — 85027 COMPLETE CBC AUTOMATED: CPT

## 2024-10-18 PROCEDURE — 84484 ASSAY OF TROPONIN QUANT: CPT

## 2024-10-18 PROCEDURE — 36415 COLL VENOUS BLD VENIPUNCTURE: CPT

## 2024-10-18 PROCEDURE — 74177 CT ABD & PELVIS W/CONTRAST: CPT | Mod: MC

## 2024-10-18 PROCEDURE — 83605 ASSAY OF LACTIC ACID: CPT

## 2024-10-18 PROCEDURE — 82947 ASSAY GLUCOSE BLOOD QUANT: CPT

## 2024-10-18 PROCEDURE — 84132 ASSAY OF SERUM POTASSIUM: CPT

## 2024-10-18 PROCEDURE — 97161 PT EVAL LOW COMPLEX 20 MIN: CPT

## 2024-10-18 PROCEDURE — 84295 ASSAY OF SERUM SODIUM: CPT

## 2024-10-18 PROCEDURE — 96375 TX/PRO/DX INJ NEW DRUG ADDON: CPT

## 2024-10-18 PROCEDURE — 82435 ASSAY OF BLOOD CHLORIDE: CPT

## 2024-10-18 PROCEDURE — 80053 COMPREHEN METABOLIC PANEL: CPT

## 2024-10-18 PROCEDURE — 96367 TX/PROPH/DG ADDL SEQ IV INF: CPT

## 2024-10-18 PROCEDURE — 97165 OT EVAL LOW COMPLEX 30 MIN: CPT

## 2024-10-18 PROCEDURE — 84100 ASSAY OF PHOSPHORUS: CPT

## 2024-10-18 PROCEDURE — 86850 RBC ANTIBODY SCREEN: CPT

## 2024-10-18 PROCEDURE — C9399: CPT

## 2024-10-18 PROCEDURE — 85025 COMPLETE CBC W/AUTO DIFF WBC: CPT

## 2024-10-18 PROCEDURE — 83735 ASSAY OF MAGNESIUM: CPT

## 2024-10-18 PROCEDURE — 85730 THROMBOPLASTIN TIME PARTIAL: CPT

## 2024-10-18 PROCEDURE — 99222 1ST HOSP IP/OBS MODERATE 55: CPT

## 2024-10-18 RX ORDER — OXYCODONE HYDROCHLORIDE 30 MG/1
1 TABLET, FILM COATED, EXTENDED RELEASE ORAL
Qty: 8 | Refills: 0
Start: 2024-10-18 | End: 2024-10-19

## 2024-10-18 RX ORDER — ACETAMINOPHEN 325 MG
2 TABLET ORAL
Qty: 0 | Refills: 0 | DISCHARGE
Start: 2024-10-18

## 2024-10-18 RX ORDER — SOD PHOS DI, MONO/K PHOS MONO 250 MG
1 TABLET ORAL ONCE
Refills: 0 | Status: COMPLETED | OUTPATIENT
Start: 2024-10-18 | End: 2024-10-18

## 2024-10-18 RX ADMIN — Medication 1 PACKET(S): at 02:18

## 2024-10-18 RX ADMIN — Medication 1000 MILLIGRAM(S): at 11:28

## 2024-10-18 RX ADMIN — Medication 81 MILLIGRAM(S): at 11:28

## 2024-10-18 RX ADMIN — Medication 17 GRAM(S): at 11:28

## 2024-10-18 RX ADMIN — ENOXAPARIN SODIUM 40 MILLIGRAM(S): 150 INJECTION SUBCUTANEOUS at 11:28

## 2024-10-18 RX ADMIN — Medication 400 MILLIGRAM(S): at 09:07

## 2024-10-18 RX ADMIN — OXYCODONE HYDROCHLORIDE 5 MILLIGRAM(S): 30 TABLET, FILM COATED, EXTENDED RELEASE ORAL at 02:00

## 2024-10-18 RX ADMIN — Medication 400 MILLIGRAM(S): at 00:25

## 2024-10-18 RX ADMIN — Medication 400 MILLIGRAM(S): at 01:19

## 2024-10-18 RX ADMIN — Medication 400 MILLIGRAM(S): at 13:43

## 2024-10-18 RX ADMIN — EZETIMIBE 10 MILLIGRAM(S): 10 TABLET ORAL at 11:28

## 2024-10-18 RX ADMIN — CHLORHEXIDINE GLUCONATE ORAL RINSE 1 APPLICATION(S): 1.2 SOLUTION DENTAL at 05:15

## 2024-10-18 RX ADMIN — Medication 1000 MILLIGRAM(S): at 06:15

## 2024-10-18 RX ADMIN — FINASTERIDE 5 MILLIGRAM(S): 5 TABLET, FILM COATED ORAL at 11:28

## 2024-10-18 RX ADMIN — Medication 1000 MILLIGRAM(S): at 00:00

## 2024-10-18 RX ADMIN — Medication 1000 MILLIGRAM(S): at 05:14

## 2024-10-18 RX ADMIN — Medication 20 MILLIEQUIVALENT(S): at 02:18

## 2024-10-18 RX ADMIN — Medication 400 MILLIGRAM(S): at 14:13

## 2024-10-18 RX ADMIN — Medication 1000 MILLIGRAM(S): at 12:00

## 2024-10-18 RX ADMIN — Medication 400 MILLIGRAM(S): at 08:37

## 2024-10-18 RX ADMIN — OXYCODONE HYDROCHLORIDE 5 MILLIGRAM(S): 30 TABLET, FILM COATED, EXTENDED RELEASE ORAL at 01:19

## 2024-10-18 NOTE — CONSULT NOTE ADULT - ASSESSMENT
ASSESSMENT:  KAL COWAN is a 69y M PMH repair an incarcerated left inguinal hernia with mesh placement (2022), HLD, and active smoking (8 cigarettes per day) s/p 10/16 laparoscopic appendectomy for acute uncomplicated appendicitis. Surgery was uncomplicated; however, post operatively patient was hypotensive. Given 2 L bolus on surgical floor without improvement of hypotension. SICU consulted for persistent hypotension, w possible initiation of vasopressors. Differential includes hypovolemia vs hemorrhagic vs septic. CT obtained on the way to SICU without any concerning findings.     PLAN:    NEURO:  - A&Ox4  - Pain: tylenol, ibuprofen, oxycodone    RESPIRATORY:   - Maintain SAO2>92%  - c/w inhaler    CARDIOVASCULAR:  - MAP goal >65    GI/NUTRITION:  - regular diet    GENITOURINARY/RENAL:  - PL @ 75  - Monitor I/Os  - Trend BUN/Cr daily  - Trend electrolytes, replete PRN  - c/w home flomax, finasterid    HEMATOLOGIC:  - Trend CBC, Coags daily  - VTE ppx: SCDs, Lovenox  - C/w home ASA    INFECTIOUS DISEASE:  - Trend WBC, monitor for signs of infection  - monitor off abx    ENDOCRINE:  - Monitor glucose    LINES/TUBES/DRAINS:  - PIV    DISPO:  - SICU    Kizzy Vogel, PGY2  SICU [id number]
69M active smoker with HLD, admitted for appendicitis s/p appendectomy 10/16, with persistent hypotension.

## 2024-10-18 NOTE — CONSULT NOTE ADULT - SUBJECTIVE AND OBJECTIVE BOX
TRAUMA/ACUTE CARE SURGERY - HOSPITAL MEDICINE CO-MANAGEMENT INITIAL VISIT NOTE  Abrahan Barajas MD  [facility] Division of Hospital Medicine  Available via MS Teams    CHIEF COMPLAINT: Patient is a 69y old  Male who presents with a chief complaint of     HPI: Mr. Quick is a 69 year old man with a pertinent history of prior emergency surgery to repair an incarcerated left inguinal hernia with mesh placement (2022), hyperlipidemia, and active smoking (8 cigarettes per day) presenting with two days of right lower quadrant pain and constipation. He reports that the pain began abruptly and has not been associated with fevers, chills, nausea or vomiting. His last colonoscopy was 2-3 years ago and reportedly unremarkable, and he is otherwise up-to-date on health maintenance. He is able to walk up two flights of stairs without becoming short of breath and takes no antithrombotic medications. In the ED he is hemodynamically stable and in no acute distress with laboratory studies notable only for leukocytosis (14.17); remainder of basic metabolic panel, complete blood count and liver chemistries unremarkable. Cross-sectional imaging demonstrates uncomplicated acute appendicitis.   (16 Oct 2024 19:40)    Pt seen and examined in SICU. Denies lightheadedness, dizziness, chest pain. Still has some abdominal pain but tolerating diet and normal bowel function.     History limited due to: [ ] Dementia  [ ] Delirium  [ ] Condition    Allergies    No Known Allergies    Intolerances        HOME MEDICATIONS: [X] Reviewed  Home Medications:  alfuzosin 10 mg oral tablet, extended release: 1 tab(s) orally once a day (16 Oct 2024 19:46)  cyclobenzaprine 5 mg oral tablet: 1 tab(s) orally 3 times a day, As Needed (16 Oct 2024 19:46)  diazePAM 5 mg oral tablet: 1 tab(s) orally every 8 hours, As Needed (16 Oct 2024 19:46)  ezetimibe 10 mg oral tablet: 1 tab(s) orally once a day (16 Oct 2024 19:46)  finasteride 5 mg oral tablet: 1 tab(s) orally once a day (16 Oct 2024 19:46)  Linzess 290 mcg oral capsule: 1 cap(s) orally once a day (16 Oct 2024 19:46)  Low Dose ASA 81 mg oral tablet: orally once a day (16 Oct 2024 19:46)  rosuvastatin 40 mg oral tablet: 1 tab(s) orally once a day (16 Oct 2024 19:46)  Vitamin D2 50 mcg (2000 intl units) oral capsule: 1 cap(s) orally once a day (16 Oct 2024 19:46)    MEDICATIONS  (STANDING):  acetaminophen     Tablet .. 1000 milliGRAM(s) Oral every 6 hours  aspirin enteric coated 81 milliGRAM(s) Oral daily  chlorhexidine 2% Cloths 1 Application(s) Topical <User Schedule>  enoxaparin Injectable 40 milliGRAM(s) SubCutaneous every 24 hours  ezetimibe 10 milliGRAM(s) Oral daily  finasteride 5 milliGRAM(s) Oral daily  fluticasone propionate/ salmeterol 500-50 MICROgram(s) Diskus 1 Dose(s) Inhalation two times a day  ibuprofen  Tablet. 400 milliGRAM(s) Oral every 6 hours  polyethylene glycol 3350 17 Gram(s) Oral daily  rosuvastatin 40 milliGRAM(s) Oral at bedtime  senna 2 Tablet(s) Oral at bedtime  tamsulosin 0.4 milliGRAM(s) Oral at bedtime    MEDICATIONS  (PRN):  magnesium hydroxide Suspension 30 milliLiter(s) Oral daily PRN Constipation  oxyCODONE    IR 5 milliGRAM(s) Oral every 4 hours PRN Severe Pain (7 - 10)  oxyCODONE    IR 2.5 milliGRAM(s) Oral every 4 hours PRN Moderate Pain (4 - 6)      PAST MEDICAL & SURGICAL HISTORY:  HLD (hyperlipidemia)      IBS (irritable bowel syndrome)      BPH (benign prostatic hyperplasia)      Closed wedge compression fracture of L1 vertebra, initial encounter      Smoker      History of laparoscopic cholecystectomy      Squamous cell skin cancer, face      [X] Reviewed     Functional Assessment: [ ] Independent  [ ] Assistance  [ ] Total care  [ ] Non-ambulatory    SOCIAL HISTORY:  Residence: [ ] [facility]  [ ] [facility]  [ ] Community  [ ] Substance abuse:   [X] Tobacco: active smoker, down to 5 cigarettes  [ ] Alcohol use:     FAMILY HISTORY:  Family history of bone cancer (Mother)    [ ] No pertinent family history in first degree relatives     REVIEW OF SYSTEMS:    CONSTITUTIONAL: No fever, weight loss, or fatigue  EYES: No eye pain, visual disturbances, or discharge  ENMT:  No difficulty hearing, tinnitus, vertigo; No sinus or throat pain  NECK: No pain or stiffness  BREASTS: No pain, masses, or nipple discharge  RESPIRATORY: No cough, wheezing, chills or hemoptysis; No shortness of breath  CARDIOVASCULAR: No chest pain, palpitations, dizziness, or leg swelling  GASTROINTESTINAL: No abdominal or epigastric pain. No nausea, vomiting, or hematemesis; No diarrhea or constipation. No melena or hematochezia.  GENITOURINARY: No dysuria, frequency, hematuria, or incontinence  NEUROLOGICAL: No headaches, memory loss, loss of strength, numbness, or tremors  SKIN: No itching, burning, rashes, or lesions   LYMPH NODES: No enlarged glands  ENDOCRINE: No heat or cold intolerance; No hair loss  MUSCULOSKELETAL: No muscle or back pain  PSYCHIATRIC: No depression, anxiety, mood swings, or difficulty sleeping  HEME/LYMPH: No easy bruising, or bleeding gums  ALLERGY AND IMMUNOLOGIC: No hives or eczema    [X] All other ROS negative  [  ] Unable to obtain due to poor mental status    PHYSICAL EXAM:    Vital Signs Last 24 Hrs  T(C): 36.4 (18 Oct 2024 07:00), Max: 36.9 (17 Oct 2024 20:00)  T(F): 97.5 (18 Oct 2024 07:00), Max: 98.4 (17 Oct 2024 20:00)  HR: 70 (18 Oct 2024 09:25) (57 - 78)  BP: 114/60 (18 Oct 2024 09:25) (82/45 - 114/60)  BP(mean): 69 (18 Oct 2024 09:00) (60 - 73)  RR: 23 (18 Oct 2024 09:25) (10 - 34)  SpO2: 96% (18 Oct 2024 09:25) (93% - 98%)    Parameters below as of 18 Oct 2024 07:00  Patient On (Oxygen Delivery Method): room air        GENERAL: NAD, well-groomed, well-developed  HEAD:  Atraumatic, Normocephalic  EYES: EOMI, PERRLA, conjunctiva and sclera clear  ENMT: Moist mucous membranes  NECK: Supple, No JVD  RESPIRATORY: Clear to auscultation bilaterally; No rales, rhonchi, wheezing, or rubs  CARDIOVASCULAR: Regular rate and rhythm; No murmurs, rubs, or gallops  GASTROINTESTINAL: Soft, Nontender, Nondistended; Bowel sounds present  GENITOURINARY: Not examined  EXTREMITIES:  2+ Peripheral Pulses, No clubbing, cyanosis, or edema  NERVOUS SYSTEM:  Alert & Oriented X3; Moving all 4 extremities; No gross sensory deficits  HEME/LYMPH: No lymphadenopathy noted  SKIN: No rashes or lesions; Incisions C/D/I    LABS:                        10.1   7.12  )-----------( 155      ( 18 Oct 2024 00:08 )             29.7     Hemoglobin: 10.1 g/dL (10-18 @ 00:08)  Hemoglobin: 11.0 g/dL (10-17 @ 16:34)  Hemoglobin: 10.6 g/dL (10-17 @ 11:36)  Hemoglobin: 12.1 g/dL (10-17 @ 07:21)  Hemoglobin: 12.9 g/dL (10-16 @ 07:41)    10-18    132[L]  |  98  |  17  ----------------------------<  128[H]  3.7   |  26  |  0.62    Ca    8.1[L]      18 Oct 2024 00:08  Phos  2.9     10-18  Mg     2.1     10-18    TPro  5.7[L]  /  Alb  3.3  /  TBili  0.3  /  DBili  x   /  AST  11  /  ALT  9[L]  /  AlkPhos  41  10-18      Urinalysis Basic - ( 18 Oct 2024 00:08 )    Color: x / Appearance: x / SG: x / pH: x  Gluc: 128 mg/dL / Ketone: x  / Bili: x / Urobili: x   Blood: x / Protein: x / Nitrite: x   Leuk Esterase: x / RBC: x / WBC x   Sq Epi: x / Non Sq Epi: x / Bacteria: x      CAPILLARY BLOOD GLUCOSE            RADIOLOGY & ADDITIONAL STUDIES:    EKG:   Personally Reviewed:  [ ] YES     Imaging:   Personally Reviewed:  [ ] YES               [ ] Consultant(s) Notes Reviewed  [x] Care Discussed with Consultants/Other Providers: Trauma Team    [ ] Fall risks identified:    [ ] High risk medications identified:    [ ] Probable osteoporosis    [ ] Possible osteomalacia    [ ] Increased delirium risk    [ ] Delirium and other risks can be reduced by:          -early ambulation          -minimizing "tethers" - IV, oxygen, catheters, etc          -avoiding hypnotics and sedatives          -maintaining hydration/nutrition          -avoid anticholinergics - diphenhydramine, etc          -pain control          -supportive environment    Advanced Directives: [ ] DNR  [ ] No feeding tube  [ ] MOLST in chart  [ ] MOLST completed today  [ ] Unknown   TRAUMA/ACUTE CARE SURGERY - HOSPITAL MEDICINE CO-MANAGEMENT INITIAL VISIT NOTE  Abrahan Barajas MD  [facility] Division of Hospital Medicine  Available via MS Teams    CHIEF COMPLAINT: Patient is a 69y old  Male who presents with a chief complaint of     HPI: Mr. Quick is a 69 year old man with a pertinent history of prior emergency surgery to repair an incarcerated left inguinal hernia with mesh placement (2022), hyperlipidemia, and active smoking (8 cigarettes per day) presenting with two days of right lower quadrant pain and constipation. He reports that the pain began abruptly and has not been associated with fevers, chills, nausea or vomiting. His last colonoscopy was 2-3 years ago and reportedly unremarkable, and he is otherwise up-to-date on health maintenance. He is able to walk up two flights of stairs without becoming short of breath and takes no antithrombotic medications. In the ED he is hemodynamically stable and in no acute distress with laboratory studies notable only for leukocytosis (14.17); remainder of basic metabolic panel, complete blood count and liver chemistries unremarkable. Cross-sectional imaging demonstrates uncomplicated acute appendicitis.   (16 Oct 2024 19:40)    Pt seen and examined in SICU. Denies lightheadedness, dizziness, chest pain. Still has some abdominal pain but tolerating diet and normal bowel function.     History limited due to: [ ] Dementia  [ ] Delirium  [ ] Condition    Allergies    No Known Allergies    Intolerances        HOME MEDICATIONS: [X] Reviewed  Home Medications:  alfuzosin 10 mg oral tablet, extended release: 1 tab(s) orally once a day (16 Oct 2024 19:46)  cyclobenzaprine 5 mg oral tablet: 1 tab(s) orally 3 times a day, As Needed (16 Oct 2024 19:46)  diazePAM 5 mg oral tablet: 1 tab(s) orally every 8 hours, As Needed (16 Oct 2024 19:46)  ezetimibe 10 mg oral tablet: 1 tab(s) orally once a day (16 Oct 2024 19:46)  finasteride 5 mg oral tablet: 1 tab(s) orally once a day (16 Oct 2024 19:46)  Linzess 290 mcg oral capsule: 1 cap(s) orally once a day (16 Oct 2024 19:46)  Low Dose ASA 81 mg oral tablet: orally once a day (16 Oct 2024 19:46)  rosuvastatin 40 mg oral tablet: 1 tab(s) orally once a day (16 Oct 2024 19:46)  Vitamin D2 50 mcg (2000 intl units) oral capsule: 1 cap(s) orally once a day (16 Oct 2024 19:46)    MEDICATIONS  (STANDING):  acetaminophen     Tablet .. 1000 milliGRAM(s) Oral every 6 hours  aspirin enteric coated 81 milliGRAM(s) Oral daily  chlorhexidine 2% Cloths 1 Application(s) Topical <User Schedule>  enoxaparin Injectable 40 milliGRAM(s) SubCutaneous every 24 hours  ezetimibe 10 milliGRAM(s) Oral daily  finasteride 5 milliGRAM(s) Oral daily  fluticasone propionate/ salmeterol 500-50 MICROgram(s) Diskus 1 Dose(s) Inhalation two times a day  ibuprofen  Tablet. 400 milliGRAM(s) Oral every 6 hours  polyethylene glycol 3350 17 Gram(s) Oral daily  rosuvastatin 40 milliGRAM(s) Oral at bedtime  senna 2 Tablet(s) Oral at bedtime  tamsulosin 0.4 milliGRAM(s) Oral at bedtime    MEDICATIONS  (PRN):  magnesium hydroxide Suspension 30 milliLiter(s) Oral daily PRN Constipation  oxyCODONE    IR 5 milliGRAM(s) Oral every 4 hours PRN Severe Pain (7 - 10)  oxyCODONE    IR 2.5 milliGRAM(s) Oral every 4 hours PRN Moderate Pain (4 - 6)      PAST MEDICAL & SURGICAL HISTORY:  HLD (hyperlipidemia)      IBS (irritable bowel syndrome)      BPH (benign prostatic hyperplasia)      Closed wedge compression fracture of L1 vertebra, initial encounter      Smoker      History of laparoscopic cholecystectomy      Squamous cell skin cancer, face      [X] Reviewed     Functional Assessment: [ ] Independent  [ ] Assistance  [ ] Total care  [ ] Non-ambulatory    SOCIAL HISTORY:  Residence: [ ] [facility]  [ ] [facility]  [ ] Community  [ ] Substance abuse:   [X] Tobacco: active smoker, down to 5 cigarettes  [ ] Alcohol use:     FAMILY HISTORY:  Family history of bone cancer (Mother)    [ ] No pertinent family history in first degree relatives     REVIEW OF SYSTEMS:    CONSTITUTIONAL: No fever, weight loss, or fatigue  EYES: No eye pain, visual disturbances, or discharge  ENMT:  No difficulty hearing, tinnitus, vertigo; No sinus or throat pain  NECK: No pain or stiffness  BREASTS: No pain, masses, or nipple discharge  RESPIRATORY: No cough, wheezing, chills or hemoptysis; No shortness of breath  CARDIOVASCULAR: No chest pain, palpitations, dizziness, or leg swelling  GASTROINTESTINAL: No abdominal or epigastric pain. No nausea, vomiting, or hematemesis; No diarrhea or constipation. No melena or hematochezia.  GENITOURINARY: No dysuria, frequency, hematuria, or incontinence  NEUROLOGICAL: No headaches, memory loss, loss of strength, numbness, or tremors  SKIN: No itching, burning, rashes, or lesions   LYMPH NODES: No enlarged glands  ENDOCRINE: No heat or cold intolerance; No hair loss  MUSCULOSKELETAL: No muscle or back pain  PSYCHIATRIC: No depression, anxiety, mood swings, or difficulty sleeping  HEME/LYMPH: No easy bruising, or bleeding gums  ALLERGY AND IMMUNOLOGIC: No hives or eczema    [X] All other ROS negative  [  ] Unable to obtain due to poor mental status    PHYSICAL EXAM:    Vital Signs Last 24 Hrs  T(C): 36.4 (18 Oct 2024 07:00), Max: 36.9 (17 Oct 2024 20:00)  T(F): 97.5 (18 Oct 2024 07:00), Max: 98.4 (17 Oct 2024 20:00)  HR: 70 (18 Oct 2024 09:25) (57 - 78)  BP: 114/60 (18 Oct 2024 09:25) (82/45 - 114/60)  BP(mean): 69 (18 Oct 2024 09:00) (60 - 73)  RR: 23 (18 Oct 2024 09:25) (10 - 34)  SpO2: 96% (18 Oct 2024 09:25) (93% - 98%)    Parameters below as of 18 Oct 2024 07:00  Patient On (Oxygen Delivery Method): room air    GENERAL: NAD, thin, elderly M  HEAD: NCAT  EYES: EOMI, PERRL, conjunctiva and sclera clear  ENMT: Moist mucous membranes  NECK: Supple, No JVD  RESPIRATORY: CTABL;   CARDIOVASCULAR: RRR  GASTROINTESTINAL: soft, nontender, nondistended  GENITOURINARY: Not examined  EXTREMITIES:  2+ b/l radial and DP pulses, wwp  NERVOUS SYSTEM:  Alert & Oriented X3; Moving all 4 extremities; No gross sensory deficits  HEME/LYMPH: No lymphadenopathy noted      LABS:                        10.1   7.12  )-----------( 155      ( 18 Oct 2024 00:08 )             29.7     Hemoglobin: 10.1 g/dL (10-18 @ 00:08)  Hemoglobin: 11.0 g/dL (10-17 @ 16:34)  Hemoglobin: 10.6 g/dL (10-17 @ 11:36)  Hemoglobin: 12.1 g/dL (10-17 @ 07:21)  Hemoglobin: 12.9 g/dL (10-16 @ 07:41)    10-18    132[L]  |  98  |  17  ----------------------------<  128[H]  3.7   |  26  |  0.62    Ca    8.1[L]      18 Oct 2024 00:08  Phos  2.9     10-18  Mg     2.1     10-18    TPro  5.7[L]  /  Alb  3.3  /  TBili  0.3  /  DBili  x   /  AST  11  /  ALT  9[L]  /  AlkPhos  41  10-18      Urinalysis Basic - ( 18 Oct 2024 00:08 )    Color: x / Appearance: x / SG: x / pH: x  Gluc: 128 mg/dL / Ketone: x  / Bili: x / Urobili: x   Blood: x / Protein: x / Nitrite: x   Leuk Esterase: x / RBC: x / WBC x   Sq Epi: x / Non Sq Epi: x / Bacteria: x      CAPILLARY BLOOD GLUCOSE            RADIOLOGY & ADDITIONAL STUDIES:    EKG:   Personally Reviewed:  [X] YES NSR lfab    Imaging:   Personally Reviewed:  [X] YES               [ ] Consultant(s) Notes Reviewed  [x] Care Discussed with Consultants/Other Providers: Trauma Team    [ ] Fall risks identified:    [ ] High risk medications identified:    [ ] Probable osteoporosis    [ ] Possible osteomalacia    [ ] Increased delirium risk    [ ] Delirium and other risks can be reduced by:          -early ambulation          -minimizing "tethers" - IV, oxygen, catheters, etc          -avoiding hypnotics and sedatives          -maintaining hydration/nutrition          -avoid anticholinergics - diphenhydramine, etc          -pain control          -supportive environment    Advanced Directives: [ ] DNR  [ ] No feeding tube  [ ] MOLST in chart  [ ] MOLST completed today  [ ] Unknown   TRAUMA/ACUTE CARE SURGERY - HOSPITAL MEDICINE CO-MANAGEMENT INITIAL VISIT NOTE  Abrahan Barajas MD  [facility] Division of Hospital Medicine  Available via MS Teams    CHIEF COMPLAINT: Patient is a 69y old  Male who presents with a chief complaint of     HPI: Mr. Quick is a 69 year old man with a pertinent history of prior emergency surgery to repair an incarcerated left inguinal hernia with mesh placement (2022), hyperlipidemia, and active smoking (8 cigarettes per day) presenting with two days of right lower quadrant pain and constipation. He reports that the pain began abruptly and has not been associated with fevers, chills, nausea or vomiting. His last colonoscopy was 2-3 years ago and reportedly unremarkable, and he is otherwise up-to-date on health maintenance. He is able to walk up two flights of stairs without becoming short of breath and takes no antithrombotic medications. In the ED he is hemodynamically stable and in no acute distress with laboratory studies notable only for leukocytosis (14.17); remainder of basic metabolic panel, complete blood count and liver chemistries unremarkable. Cross-sectional imaging demonstrates uncomplicated acute appendicitis.   (16 Oct 2024 19:40)    Pt seen and examined in SICU. Denies lightheadedness, dizziness, chest pain. Still has some abdominal pain but tolerating diet and normal bowel function.     History limited due to: [ ] Dementia  [ ] Delirium  [ ] Condition    Allergies    No Known Allergies    Intolerances        HOME MEDICATIONS: [X] Reviewed  Home Medications:  alfuzosin 10 mg oral tablet, extended release: 1 tab(s) orally once a day (16 Oct 2024 19:46)  cyclobenzaprine 5 mg oral tablet: 1 tab(s) orally 3 times a day, As Needed (16 Oct 2024 19:46)  diazePAM 5 mg oral tablet: 1 tab(s) orally every 8 hours, As Needed (16 Oct 2024 19:46)  ezetimibe 10 mg oral tablet: 1 tab(s) orally once a day (16 Oct 2024 19:46)  finasteride 5 mg oral tablet: 1 tab(s) orally once a day (16 Oct 2024 19:46)  Linzess 290 mcg oral capsule: 1 cap(s) orally once a day (16 Oct 2024 19:46)  Low Dose ASA 81 mg oral tablet: orally once a day (16 Oct 2024 19:46)  rosuvastatin 40 mg oral tablet: 1 tab(s) orally once a day (16 Oct 2024 19:46)  Vitamin D2 50 mcg (2000 intl units) oral capsule: 1 cap(s) orally once a day (16 Oct 2024 19:46)    MEDICATIONS  (STANDING):  acetaminophen     Tablet .. 1000 milliGRAM(s) Oral every 6 hours  aspirin enteric coated 81 milliGRAM(s) Oral daily  chlorhexidine 2% Cloths 1 Application(s) Topical <User Schedule>  enoxaparin Injectable 40 milliGRAM(s) SubCutaneous every 24 hours  ezetimibe 10 milliGRAM(s) Oral daily  finasteride 5 milliGRAM(s) Oral daily  fluticasone propionate/ salmeterol 500-50 MICROgram(s) Diskus 1 Dose(s) Inhalation two times a day  ibuprofen  Tablet. 400 milliGRAM(s) Oral every 6 hours  polyethylene glycol 3350 17 Gram(s) Oral daily  rosuvastatin 40 milliGRAM(s) Oral at bedtime  senna 2 Tablet(s) Oral at bedtime  tamsulosin 0.4 milliGRAM(s) Oral at bedtime    MEDICATIONS  (PRN):  magnesium hydroxide Suspension 30 milliLiter(s) Oral daily PRN Constipation  oxyCODONE    IR 5 milliGRAM(s) Oral every 4 hours PRN Severe Pain (7 - 10)  oxyCODONE    IR 2.5 milliGRAM(s) Oral every 4 hours PRN Moderate Pain (4 - 6)      PAST MEDICAL & SURGICAL HISTORY:  HLD (hyperlipidemia)      IBS (irritable bowel syndrome)      BPH (benign prostatic hyperplasia)      Closed wedge compression fracture of L1 vertebra, initial encounter      Smoker      History of laparoscopic cholecystectomy      Squamous cell skin cancer, face      [X] Reviewed     Functional Assessment: [ ] Independent  [ ] Assistance  [ ] Total care  [ ] Non-ambulatory    SOCIAL HISTORY:  Residence: [ ] [facility]  [ ] [facility]  [ ] Community  [ ] Substance abuse:   [X] Tobacco: active smoker, down to 5 cigarettes  [ ] Alcohol use:     FAMILY HISTORY:  Family history of bone cancer (Mother)    [ ] No pertinent family history in first degree relatives     REVIEW OF SYSTEMS:  [X] All other ROS negative  [  ] Unable to obtain due to poor mental status    PHYSICAL EXAM:    Vital Signs Last 24 Hrs  T(C): 36.4 (18 Oct 2024 07:00), Max: 36.9 (17 Oct 2024 20:00)  T(F): 97.5 (18 Oct 2024 07:00), Max: 98.4 (17 Oct 2024 20:00)  HR: 70 (18 Oct 2024 09:25) (57 - 78)  BP: 114/60 (18 Oct 2024 09:25) (82/45 - 114/60)  BP(mean): 69 (18 Oct 2024 09:00) (60 - 73)  RR: 23 (18 Oct 2024 09:25) (10 - 34)  SpO2: 96% (18 Oct 2024 09:25) (93% - 98%)    Parameters below as of 18 Oct 2024 07:00  Patient On (Oxygen Delivery Method): room air    GENERAL: NAD, thin, elderly M  HEAD: NCAT  EYES: EOMI, PERRL, conjunctiva and sclera clear  ENMT: Moist mucous membranes  NECK: Supple, No JVD  RESPIRATORY: CTABL;   CARDIOVASCULAR: RRR  GASTROINTESTINAL: soft, nontender, nondistended  GENITOURINARY: Not examined  EXTREMITIES:  2+ b/l radial and DP pulses, wwp  NERVOUS SYSTEM:  Alert & Oriented X3; Moving all 4 extremities; No gross sensory deficits  HEME/LYMPH: No lymphadenopathy noted      LABS:                        10.1   7.12  )-----------( 155      ( 18 Oct 2024 00:08 )             29.7     Hemoglobin: 10.1 g/dL (10-18 @ 00:08)  Hemoglobin: 11.0 g/dL (10-17 @ 16:34)  Hemoglobin: 10.6 g/dL (10-17 @ 11:36)  Hemoglobin: 12.1 g/dL (10-17 @ 07:21)  Hemoglobin: 12.9 g/dL (10-16 @ 07:41)    10-18    132[L]  |  98  |  17  ----------------------------<  128[H]  3.7   |  26  |  0.62    Ca    8.1[L]      18 Oct 2024 00:08  Phos  2.9     10-18  Mg     2.1     10-18    TPro  5.7[L]  /  Alb  3.3  /  TBili  0.3  /  DBili  x   /  AST  11  /  ALT  9[L]  /  AlkPhos  41  10-18      Urinalysis Basic - ( 18 Oct 2024 00:08 )    Color: x / Appearance: x / SG: x / pH: x  Gluc: 128 mg/dL / Ketone: x  / Bili: x / Urobili: x   Blood: x / Protein: x / Nitrite: x   Leuk Esterase: x / RBC: x / WBC x   Sq Epi: x / Non Sq Epi: x / Bacteria: x      CAPILLARY BLOOD GLUCOSE            RADIOLOGY & ADDITIONAL STUDIES:    EKG:   Personally Reviewed:  [X] YES NSR lfab    Imaging:   Personally Reviewed:  [X] YES               [ ] Consultant(s) Notes Reviewed  [x] Care Discussed with Consultants/Other Providers: Trauma Team    [ ] Fall risks identified:    [ ] High risk medications identified:    [ ] Probable osteoporosis    [ ] Possible osteomalacia    [ ] Increased delirium risk    [ ] Delirium and other risks can be reduced by:          -early ambulation          -minimizing "tethers" - IV, oxygen, catheters, etc          -avoiding hypnotics and sedatives          -maintaining hydration/nutrition          -avoid anticholinergics - diphenhydramine, etc          -pain control          -supportive environment    Advanced Directives: [ ] DNR  [ ] No feeding tube  [ ] MOLST in chart  [ ] MOLST completed today  [ ] Unknown

## 2024-10-18 NOTE — CONSULT NOTE ADULT - PROBLEM SELECTOR RECOMMENDATION 9
Pt persistently hypotensive post operatively, responding to fluid resuscitation. On my exam, pt is normotensive, peripheral pulses strong, pt denies any symptoms of hypotension including dizziness  - if he continues to be hypotension, can hold tamsulosin and finasteride as they may lower BP  - echo if hypotension returns

## 2024-10-18 NOTE — DISCHARGE NOTE NURSING/CASE MANAGEMENT/SOCIAL WORK - FINANCIAL ASSISTANCE
[facility] provides services at a reduced cost to those who are determined to be eligible through [facility]’s financial assistance program. Information regarding [facility]’s financial assistance program can be found by going to [email or web address] or by calling [phone].

## 2024-10-18 NOTE — OCCUPATIONAL THERAPY INITIAL EVALUATION ADULT - PERTINENT HX OF CURRENT PROBLEM, REHAB EVAL
69y M PMH repair an incarcerated left inguinal hernia with mesh placement (2022), HLD, and active smoking (8 cigarettes per day) presenting with two days of RLQ pain and constipation. In ED, WBC elevated and CT concerning for acute uncomplicated appendicitis. Taken to OR for laparoscopic appendectomy. Surgery was uncomplicated; however, post operatively patient was hypotensive. Given 2 L bolus on surgical floor without improvement of hypotension. SICU consulted for persistent hypotension, w possible initiation of vasopressors.    10/16 s/p Laparoscopic appendectomy

## 2024-10-18 NOTE — DISCHARGE NOTE NURSING/CASE MANAGEMENT/SOCIAL WORK - PATIENT PORTAL LINK FT
You can access the FollowMyHealth Patient Portal offered by [facility] by registering at the following website: [email or web address]. By joining [employer]’s FollowMyHealth portal, you will also be able to view your health information using other applications (apps) compatible with our system.

## 2024-10-18 NOTE — PROGRESS NOTE ADULT - SUBJECTIVE AND OBJECTIVE BOX
NEURO  Exam: A&O x 4  Meds: acetaminophen     Tablet .. 1000 milliGRAM(s) Oral every 6 hours  ibuprofen  Tablet. 400 milliGRAM(s) Oral every 6 hours  oxyCODONE    IR 5 milliGRAM(s) Oral every 4 hours PRN Severe Pain (7 - 10)  oxyCODONE    IR 2.5 milliGRAM(s) Oral every 4 hours PRN Moderate Pain (4 - 6)      RESPIRATORY  RR: 15 (10-18-24 @ 00:00) (15 - 34)  SpO2: 94% (10-18-24 @ 00:00) (94% - 98%)  Exam: room air  ABG - ( 17 Oct 2024 11:36 )  pH: x     /  pCO2: x     /  pO2: x     / HCO3: x     / Base Excess: x     /  SaO2: x       Lactate: 1.3        Meds: fluticasone propionate/ salmeterol 500-50 MICROgram(s) Diskus 1 Dose(s) Inhalation two times a day      CARDIOVASCULAR  HR: 59 (10-18-24 @ 00:00) (55 - 78)  BP: 88/52 (10-18-24 @ 00:00) (74/38 - 106/63)  BP(mean): 65 (10-18-24 @ 00:00) (60 - 78)  VBG - ( 18 Oct 2024 00:03 )  pH: 7.44  /  pCO2: 41    /  pO2: 54    / HCO3: 28    / Base Excess: 3.3   /  SaO2: 91.0   Lactate: 0.8        Exam:   Cardiac Rhythm:   Perfusion     [x]Adequate   [ ]Inadequate  Mentation   [x ]Normal       [ ]Reduced  Extremities  [x]Warm         [ ]Cool  Volume Status [ ]Hypervolemic [x]Euvolemic [ ]Hypovolemic  Meds:     GI/NUTRITION  Exam: soft, NT/ND  Diet: regular diet  Meds: magnesium hydroxide Suspension 30 milliLiter(s) Oral daily PRN Constipation  polyethylene glycol 3350 17 Gram(s) Oral daily  senna 2 Tablet(s) Oral at bedtime      GENITOURINARY  I&O's Detail    10-16 @ 07:01  -  10-17 @ 07:00  --------------------------------------------------------  IN:    Oral Fluid: 50 mL  Total IN: 50 mL    OUT:    Voided (mL): 150 mL  Total OUT: 150 mL    Total NET: -100 mL      10-17 @ 07:01  -  10-18 @ 01:45  --------------------------------------------------------  IN:    IV PiggyBack: 300 mL    multiple electrolytes Injection Type 1.: 975 mL    Oral Fluid: 980 mL  Total IN: 2255 mL    OUT:    Voided (mL): 425 mL  Total OUT: 425 mL    Total NET: 1830 mL          10-18    132[L]  |  98  |  17  ----------------------------<  128[H]  3.7   |  26  |  0.62    Ca    8.1[L]      18 Oct 2024 00:08  Phos  2.9     10-18  Mg     2.1     10-18    TPro  5.7[L]  /  Alb  3.3  /  TBili  0.3  /  DBili  x   /  AST  11  /  ALT  9[L]  /  AlkPhos  41  10-18    Meds: multiple electrolytes Injection Type 1 1000 milliLiter(s) IV Continuous <Continuous>  tamsulosin 0.4 milliGRAM(s) Oral at bedtime      HEMATOLOGIC  Meds: aspirin enteric coated 81 milliGRAM(s) Oral daily  enoxaparin Injectable 40 milliGRAM(s) SubCutaneous every 24 hours                          10.1   7.12  )-----------( 155      ( 18 Oct 2024 00:08 )             29.7     PT/INR - ( 16 Oct 2024 07:41 )   PT: 13.1 sec;   INR: 1.14 ratio         PTT - ( 16 Oct 2024 07:41 )  PTT:29.0 sec    INFECTIOUS DISEASES  T(C): 36.9 (10-17-24 @ 20:00), Max: 37.4 (10-17-24 @ 06:00)  Wt(kg): --  WBC Count: 7.12 K/uL (10-18 @ 00:08)  WBC Count: 7.84 K/uL (10-17 @ 16:34)  WBC Count: 9.15 K/uL (10-17 @ 11:36)  WBC Count: 11.61 K/uL (10-17 @ 07:21)    Recent Cultures:    Meds:     ENDOCRINE  Capillary Blood Glucose    Meds: ezetimibe 10 milliGRAM(s) Oral daily  finasteride 5 milliGRAM(s) Oral daily  rosuvastatin 40 milliGRAM(s) Oral at bedtime      ACCESS DEVICES:  [ ] Peripheral IV  [ ] Central Venous Line		[ ] R	[ ] L	[ ] IJ	[ ] Fem	[ ] SC	Placed:   [ ] Arterial Line			[ ] R	[ ] L	[ ] Fem	[ ] Rad	[ ] Ax	Placed:   [ ] PICC:					[ ] Mediport  [ ] Urinary Catheter, Date Placed:   [ ] Necessity of urinary, arterial, and venous catheters discussed    OTHER MEDICATIONS:  chlorhexidine 2% Cloths 1 Application(s) Topical <User Schedule>      IMAGING:
ACS SURGERY DAILY PROGRESS NOTE:     SUBJECTIVE/ROS: Patient seen and examined.  abdominal pain is controlled, denies n/v        MEDICATIONS  (STANDING):  acetaminophen     Tablet .. 1000 milliGRAM(s) Oral every 6 hours  aspirin enteric coated 81 milliGRAM(s) Oral daily  chlorhexidine 2% Cloths 1 Application(s) Topical daily  enoxaparin Injectable 40 milliGRAM(s) SubCutaneous every 24 hours  ezetimibe 10 milliGRAM(s) Oral daily  finasteride 5 milliGRAM(s) Oral daily  fluticasone propionate/ salmeterol 500-50 MICROgram(s) Diskus 1 Dose(s) Inhalation two times a day  ibuprofen  Tablet. 400 milliGRAM(s) Oral every 6 hours  multiple electrolytes Injection Type 1 1000 milliLiter(s) (75 mL/Hr) IV Continuous <Continuous>  rosuvastatin 40 milliGRAM(s) Oral at bedtime  tamsulosin 0.4 milliGRAM(s) Oral at bedtime    MEDICATIONS  (PRN):  oxyCODONE    IR 5 milliGRAM(s) Oral every 4 hours PRN Severe Pain (7 - 10)  oxyCODONE    IR 2.5 milliGRAM(s) Oral every 4 hours PRN Moderate Pain (4 - 6)      OBJECTIVE:    Vital Signs Last 24 Hrs  T(C): 36.8 (17 Oct 2024 08:36), Max: 37.4 (17 Oct 2024 06:00)  T(F): 98.3 (17 Oct 2024 08:36), Max: 99.3 (17 Oct 2024 06:00)  HR: 57 (17 Oct 2024 10:45) (55 - 78)  BP: 80/48 (17 Oct 2024 10:45) (74/38 - 122/57)  BP(mean): 72 (17 Oct 2024 03:00) (68 - 83)  RR: 18 (17 Oct 2024 08:36) (15 - 22)  SpO2: 97% (17 Oct 2024 08:36) (94% - 100%)    Parameters below as of 17 Oct 2024 08:36  Patient On (Oxygen Delivery Method): room air            I&O's Detail    16 Oct 2024 07:01  -  17 Oct 2024 07:00  --------------------------------------------------------  IN:    Oral Fluid: 50 mL  Total IN: 50 mL    OUT:    Voided (mL): 150 mL  Total OUT: 150 mL    Total NET: -100 mL      17 Oct 2024 07:01  -  17 Oct 2024 13:32  --------------------------------------------------------  IN:    Oral Fluid: 280 mL  Total IN: 280 mL    OUT:  Total OUT: 0 mL    Total NET: 280 mL          Daily Height in cm: 172.72 (16 Oct 2024 21:18)    Daily     LABS:                        10.6   9.15  )-----------( 161      ( 17 Oct 2024 11:36 )             31.9     10-17    135  |  99  |  14  ----------------------------<  103[H]  4.5   |  24  |  0.69    Ca    8.6      17 Oct 2024 07:21  Phos  3.5     10-17  Mg     1.8     10-17    TPro  7.1  /  Alb  4.2  /  TBili  0.9  /  DBili  x   /  AST  13  /  ALT  15  /  AlkPhos  49  10-16    PT/INR - ( 16 Oct 2024 07:41 )   PT: 13.1 sec;   INR: 1.14 ratio         PTT - ( 16 Oct 2024 07:41 )  PTT:29.0 sec  Urinalysis Basic - ( 17 Oct 2024 07:21 )    Color: x / Appearance: x / SG: x / pH: x  Gluc: 103 mg/dL / Ketone: x  / Bili: x / Urobili: x   Blood: x / Protein: x / Nitrite: x   Leuk Esterase: x / RBC: x / WBC x   Sq Epi: x / Non Sq Epi: x / Bacteria: x        PHYSICAL EXAM:  Gen: NAD, A&Ox3  Pulm: No respiratory distress  CV: RRR, no JVD  Abd: Soft, slightly Tender, ND, port incisions C/D/I covered with dressings.

## 2024-10-18 NOTE — PROGRESS NOTE ADULT - ASSESSMENT
ASSESSMENT: 69y M PMH repair an incarcerated left inguinal hernia with mesh placement (2022), HLD, and active smoking (8 cigarettes per day) s/p 10/16 laparoscopic appendectomy for acute uncomplicated appendicitis. Surgery was uncomplicated; however, post operatively patient was hypotensive. Given 2 L bolus on surgical floor without improvement of hypotension. SICU consulted for persistent hypotension, w possible initiation of vasopressors.      PLAN:    Neuro:  - A&Ox4  - Pain: tylenol, ibuprofen, oxycodone    Resp:  - Out of bed to chair, ambulate as tolerated, and incentive spirometry to prevent atelectasis  - Advair (on home wixela)     CV:  - goal MAP > 65 mmHg    GI:   - regular diet  - Bowel regimen with senna & Miralax    Renal:  - Monitor I&Os and electrolytes w/ repletions as necessary  - PL @ 75  - c/w home flomax, finasteride     Heme:  - Monitor CBC and coags  - Lovenox for VTE prophylaxis  - ASA    ID:   - Monitor for clinical evidence of active infection  - Monitor WBC, temperature, and procalcitonin  - off abx     Endo:   - Monitor glucose    Code Status: full    Disposition: SICU    Lizett Barroso PA-C     [id number]

## 2024-10-19 ENCOUNTER — INPATIENT (INPATIENT)
Facility: HOSPITAL | Age: 69
LOS: 1 days | Discharge: HOME | DRG: 394 | End: 2024-10-21
Attending: SURGERY | Admitting: SURGERY
Payer: MEDICARE

## 2024-10-19 VITALS
SYSTOLIC BLOOD PRESSURE: 134 MMHG | TEMPERATURE: 98 F | RESPIRATION RATE: 18 BRPM | OXYGEN SATURATION: 98 % | HEART RATE: 79 BPM | WEIGHT: 121.92 LBS | HEIGHT: 68 IN | DIASTOLIC BLOOD PRESSURE: 77 MMHG

## 2024-10-19 DIAGNOSIS — Z90.49 ACQUIRED ABSENCE OF OTHER SPECIFIED PARTS OF DIGESTIVE TRACT: Chronic | ICD-10-CM

## 2024-10-19 DIAGNOSIS — R10.9 UNSPECIFIED ABDOMINAL PAIN: ICD-10-CM

## 2024-10-19 DIAGNOSIS — C44.320 SQUAMOUS CELL CARCINOMA OF SKIN OF UNSPECIFIED PARTS OF FACE: Chronic | ICD-10-CM

## 2024-10-19 LAB
-  AMPICILLIN: SIGNIFICANT CHANGE UP
-  CIPROFLOXACIN: SIGNIFICANT CHANGE UP
-  LEVOFLOXACIN: SIGNIFICANT CHANGE UP
-  NITROFURANTOIN: SIGNIFICANT CHANGE UP
-  TETRACYCLINE: SIGNIFICANT CHANGE UP
-  VANCOMYCIN: SIGNIFICANT CHANGE UP
ALBUMIN SERPL ELPH-MCNC: 4.1 G/DL — SIGNIFICANT CHANGE UP (ref 3.3–5)
ALP SERPL-CCNC: 54 U/L — SIGNIFICANT CHANGE UP (ref 40–120)
ALT FLD-CCNC: 11 U/L — SIGNIFICANT CHANGE UP (ref 10–45)
ANION GAP SERPL CALC-SCNC: 13 MMOL/L — SIGNIFICANT CHANGE UP (ref 5–17)
APTT BLD: 28.8 SEC — SIGNIFICANT CHANGE UP (ref 24.5–35.6)
AST SERPL-CCNC: 15 U/L — SIGNIFICANT CHANGE UP (ref 10–40)
BASOPHILS # BLD AUTO: 0.03 K/UL — SIGNIFICANT CHANGE UP (ref 0–0.2)
BASOPHILS NFR BLD AUTO: 0.3 % — SIGNIFICANT CHANGE UP (ref 0–2)
BILIRUB SERPL-MCNC: 0.5 MG/DL — SIGNIFICANT CHANGE UP (ref 0.2–1.2)
BUN SERPL-MCNC: 13 MG/DL — SIGNIFICANT CHANGE UP (ref 7–23)
CALCIUM SERPL-MCNC: 9.4 MG/DL — SIGNIFICANT CHANGE UP (ref 8.4–10.5)
CHLORIDE SERPL-SCNC: 99 MMOL/L — SIGNIFICANT CHANGE UP (ref 96–108)
CO2 SERPL-SCNC: 24 MMOL/L — SIGNIFICANT CHANGE UP (ref 22–31)
CREAT SERPL-MCNC: 0.63 MG/DL — SIGNIFICANT CHANGE UP (ref 0.5–1.3)
CULTURE RESULTS: ABNORMAL
EGFR: 103 ML/MIN/1.73M2 — SIGNIFICANT CHANGE UP
EOSINOPHIL # BLD AUTO: 0.14 K/UL — SIGNIFICANT CHANGE UP (ref 0–0.5)
EOSINOPHIL NFR BLD AUTO: 1.5 % — SIGNIFICANT CHANGE UP (ref 0–6)
GLUCOSE SERPL-MCNC: 118 MG/DL — HIGH (ref 70–99)
HCT VFR BLD CALC: 41.8 % — SIGNIFICANT CHANGE UP (ref 39–50)
HGB BLD-MCNC: 14.2 G/DL — SIGNIFICANT CHANGE UP (ref 13–17)
IMM GRANULOCYTES NFR BLD AUTO: 0.6 % — SIGNIFICANT CHANGE UP (ref 0–0.9)
INR BLD: 1.04 RATIO — SIGNIFICANT CHANGE UP (ref 0.85–1.16)
LACTATE BLDV-MCNC: 1.1 MMOL/L — SIGNIFICANT CHANGE UP (ref 0.5–2)
LIDOCAIN IGE QN: 9 U/L — SIGNIFICANT CHANGE UP (ref 7–60)
LYMPHOCYTES # BLD AUTO: 0.83 K/UL — LOW (ref 1–3.3)
LYMPHOCYTES # BLD AUTO: 8.7 % — LOW (ref 13–44)
MAGNESIUM SERPL-MCNC: 2 MG/DL — SIGNIFICANT CHANGE UP (ref 1.6–2.6)
MCHC RBC-ENTMCNC: 32.1 PG — SIGNIFICANT CHANGE UP (ref 27–34)
MCHC RBC-ENTMCNC: 34 GM/DL — SIGNIFICANT CHANGE UP (ref 32–36)
MCV RBC AUTO: 94.4 FL — SIGNIFICANT CHANGE UP (ref 80–100)
METHOD TYPE: SIGNIFICANT CHANGE UP
MONOCYTES # BLD AUTO: 0.84 K/UL — SIGNIFICANT CHANGE UP (ref 0–0.9)
MONOCYTES NFR BLD AUTO: 8.8 % — SIGNIFICANT CHANGE UP (ref 2–14)
NEUTROPHILS # BLD AUTO: 7.68 K/UL — HIGH (ref 1.8–7.4)
NEUTROPHILS NFR BLD AUTO: 80.1 % — HIGH (ref 43–77)
NRBC # BLD: 0 /100 WBCS — SIGNIFICANT CHANGE UP (ref 0–0)
ORGANISM # SPEC MICROSCOPIC CNT: ABNORMAL
ORGANISM # SPEC MICROSCOPIC CNT: ABNORMAL
PHOSPHATE SERPL-MCNC: 2.8 MG/DL — SIGNIFICANT CHANGE UP (ref 2.5–4.5)
PLATELET # BLD AUTO: 260 K/UL — SIGNIFICANT CHANGE UP (ref 150–400)
POTASSIUM SERPL-MCNC: 3.9 MMOL/L — SIGNIFICANT CHANGE UP (ref 3.5–5.3)
POTASSIUM SERPL-SCNC: 3.9 MMOL/L — SIGNIFICANT CHANGE UP (ref 3.5–5.3)
PROCALCITONIN SERPL-MCNC: 0.2 NG/ML — HIGH (ref 0.02–0.1)
PROT SERPL-MCNC: 7.3 G/DL — SIGNIFICANT CHANGE UP (ref 6–8.3)
PROTHROM AB SERPL-ACNC: 12 SEC — SIGNIFICANT CHANGE UP (ref 9.9–13.4)
RBC # BLD: 4.43 M/UL — SIGNIFICANT CHANGE UP (ref 4.2–5.8)
RBC # BLD: 4.43 M/UL — SIGNIFICANT CHANGE UP (ref 4.2–5.8)
RBC # FLD: 11.4 % — SIGNIFICANT CHANGE UP (ref 10.3–14.5)
RETICS #: 47.8 K/UL — SIGNIFICANT CHANGE UP (ref 25–125)
RETICS/RBC NFR: 1.1 % — SIGNIFICANT CHANGE UP (ref 0.5–2.5)
SODIUM SERPL-SCNC: 136 MMOL/L — SIGNIFICANT CHANGE UP (ref 135–145)
SPECIMEN SOURCE: SIGNIFICANT CHANGE UP
WBC # BLD: 9.58 K/UL — SIGNIFICANT CHANGE UP (ref 3.8–10.5)
WBC # FLD AUTO: 9.58 K/UL — SIGNIFICANT CHANGE UP (ref 3.8–10.5)

## 2024-10-19 PROCEDURE — 71046 X-RAY EXAM CHEST 2 VIEWS: CPT | Mod: 26

## 2024-10-19 PROCEDURE — 74177 CT ABD & PELVIS W/CONTRAST: CPT | Mod: 26,MC

## 2024-10-19 PROCEDURE — 99285 EMERGENCY DEPT VISIT HI MDM: CPT | Mod: GC

## 2024-10-19 RX ORDER — FAMOTIDINE 10 MG/ML
20 INJECTION INTRAVENOUS ONCE
Refills: 0 | Status: COMPLETED | OUTPATIENT
Start: 2024-10-19 | End: 2024-10-19

## 2024-10-19 RX ORDER — FINASTERIDE 5 MG/1
5 TABLET, FILM COATED ORAL DAILY
Refills: 0 | Status: DISCONTINUED | OUTPATIENT
Start: 2024-10-19 | End: 2024-10-21

## 2024-10-19 RX ORDER — ROSUVASTATIN CALCIUM 10 MG
40 TABLET ORAL AT BEDTIME
Refills: 0 | Status: DISCONTINUED | OUTPATIENT
Start: 2024-10-19 | End: 2024-10-21

## 2024-10-19 RX ORDER — EZETIMIBE 10 MG/1
10 TABLET ORAL DAILY
Refills: 0 | Status: DISCONTINUED | OUTPATIENT
Start: 2024-10-19 | End: 2024-10-21

## 2024-10-19 RX ORDER — ENOXAPARIN SODIUM 40MG/0.4ML
40 SYRINGE (ML) SUBCUTANEOUS EVERY 24 HOURS
Refills: 0 | Status: DISCONTINUED | OUTPATIENT
Start: 2024-10-19 | End: 2024-10-21

## 2024-10-19 RX ORDER — ONDANSETRON HYDROCHLORIDE 2 MG/ML
4 INJECTION, SOLUTION INTRAMUSCULAR; INTRAVENOUS ONCE
Refills: 0 | Status: COMPLETED | OUTPATIENT
Start: 2024-10-19 | End: 2024-10-19

## 2024-10-19 RX ORDER — ACETAMINOPHEN 500 MG
1000 TABLET ORAL ONCE
Refills: 0 | Status: COMPLETED | OUTPATIENT
Start: 2024-10-19 | End: 2024-10-19

## 2024-10-19 RX ADMIN — ONDANSETRON HYDROCHLORIDE 4 MILLIGRAM(S): 2 INJECTION, SOLUTION INTRAMUSCULAR; INTRAVENOUS at 17:19

## 2024-10-19 RX ADMIN — Medication 1000 MILLIGRAM(S): at 17:20

## 2024-10-19 RX ADMIN — Medication 1000 MILLILITER(S): at 18:20

## 2024-10-19 RX ADMIN — Medication 400 MILLIGRAM(S): at 17:19

## 2024-10-19 RX ADMIN — FAMOTIDINE 20 MILLIGRAM(S): 10 INJECTION INTRAVENOUS at 17:19

## 2024-10-19 RX ADMIN — FINASTERIDE 5 MILLIGRAM(S): 5 TABLET, FILM COATED ORAL at 22:58

## 2024-10-19 RX ADMIN — Medication 2000 MILLILITER(S): at 17:19

## 2024-10-19 NOTE — H&P ADULT - NSHPLABSRESULTS_GEN_ALL_CORE
14.2   9.58  )-----------( 260      ( 19 Oct 2024 17:28 )             41.8       10-19    136  |  99  |  13  ----------------------------<  118[H]  3.9   |  24  |  0.63    Ca    9.4      19 Oct 2024 17:28  Phos  2.8     10-19  Mg     2.0     10-19    TPro  7.3  /  Alb  4.1  /  TBili  0.5  /  DBili  x   /  AST  15  /  ALT  11  /  AlkPhos  54  10-19    Urinalysis Basic - ( 19 Oct 2024 17:28 )    Color: x / Appearance: x / SG: x / pH: x  Gluc: 118 mg/dL / Ketone: x  / Bili: x / Urobili: x   Blood: x / Protein: x / Nitrite: x   Leuk Esterase: x / RBC: x / WBC x   Sq Epi: x / Non Sq Epi: x / Bacteria: x      PT/INR - ( 19 Oct 2024 17:28 )   PT: 12.0 sec;   INR: 1.04 ratio         PTT - ( 19 Oct 2024 17:28 )  PTT:28.8 sec    Lactate Trend  10-17 @ 11:36 Lactate:1.3       CAPILLARY BLOOD GLUCOSE    Culture Results:   10,000 - 49,000 CFU/mL Enterococcus faecalis (10-16 @ 08:11)    < from: CT Abdomen and Pelvis w/ IV Cont (10.19.24 @ 18:31) >    CONTRAST/COMPLICATIONS:  IV Contrast: Omnipaque 350  90 cc administered   10 cc discarded  Oral Contrast: NONE  Complications: None reported at time of study completion    PROCEDURE:  CT of the Abdomen and Pelvis was performed.  Sagittal and coronal reformats were performed.    FINDINGS:  LOWER CHEST: Small bibasilar atelectasis.    LIVER: Within normal limits.  BILE DUCTS: Normal caliber.  GALLBLADDER: Cholecystectomy.  SPLEEN: Within normal limits.  PANCREAS: Within normal limits.  ADRENALS: Within normal limits.  KIDNEYS/URETERS: Symmetric enhancement bilaterally, no hydronephrosis.    BLADDER: Underdistended.  REPRODUCTIVE ORGANS: Prostate is enlarged.    BOWEL: The entire GI tract is filled with fluid with diffusely dilated   loops of large and small bowel and no discrete transition point to   suggest mechanical bowel obstruction. Mild compression of the second   through fourth portions of the duodenum, likely due to anteriorly located   fluid-filled bowel loops and mass effect. No discrete wall thickening or   pneumatosis. Multifocal hyperenhancement throughout the small greater   than large bowel. Appendix surgically absent  PERITONEUM/RETROPERITONEUM: Stable versus decreased volume of   pneumoperitoneum, likely redistributed due to abdominal fullness and   related to recent procedure.  VESSELS: Atherosclerotic changes. No portal venous gas. Patent portal   mesenteric veins  LYMPH NODES: No lymphadenopathy.  ABDOMINAL WALL: Postsurgical changes. Mild diffuse subcutaneous edema.  BONES: Status post L1 kyphoplasty, unchanged. No acute fractures.    IMPRESSION:  Severe postoperative ileus with possible superimposed enteritis. No   pneumatosis or portal venous gas/thrombus.    Postoperative pneumoperitoneumis stable versus decreased in volume and   redistributed.    Recommend nasogastric/orogastric decompression. Serial evaluation with   contrast-enhanced CT is recommended when clinically appropriate.    < end of copied text >

## 2024-10-19 NOTE — H&P ADULT - HISTORY OF PRESENT ILLNESS
69 year old man with a pertinent history of prior emergency surgery to repair an incarcerated left inguinal hernia with mesh placement (2022), hyperlipidemia, and active smoking (8 cigarettes per day) who initially presented 10/16/24 found to have acute appendicitis and underwent laparoscopic appendectomy 10/16/24. Post operatively patient was hypotensive nonresponsive to 2L fluid resuscitation and was admitted for to SICU for hemodynamic monitoring. CT A/P and laboratory workup were unrevealing. Patient's blood pressure somewhat improved/stabilized and he was discharged home in stable condition on 10/18/24. He now presents on POD3 c/o generalized malaise, anorexia, abdominal pain, and dark black stools. He ate just half a piece of toast with tea for breakfast this morning, which he immediately vomited. The vomitus was nonbloody, nonbilious. He then experienced multiple episodes of black liquid stools, associated with crampy lower abdominal pain. He denies fevers, chills. He did not take any PO pain medication after leaving the hospital.

## 2024-10-19 NOTE — ED ADULT TRIAGE NOTE - SPO2 (%)
Adderall XR 20mg script routed to Dr. Christine Mason.
Medication:    Outpatient Medications Marked as Taking for the 3/26/19 encounter (Refill) with Ricardo Anderson MD   Medication Sig Dispense Refill   â¢ amphetamine-dextroamphetamine (ADDERALL XR) 20 MG 24 hr capsule Take 1 capsule by mouth daily. At 2:00pm 30 capsule 0       Pharmacy has been verified.     [x] Patient completely out of medication     Patient currently has follow up appointment scheduled    Message to Prescriber:
98

## 2024-10-19 NOTE — ED PROVIDER NOTE - ATTENDING CONTRIBUTION TO CARE
------------ATTENDING NOTE------------  pt c/o several days of increasing diffuse abdominal pain, bloating, this AM w/ nausea and anorexia, complicated as post op day #3 lap appendectomy, complicated by past abdominal surgeries -- no fevers, last BM was small amt liquid dark/black stool this AM, no other noted GIB, awaiting labs/imaging and c/w Surgery and close reassessments -->  - Nino Dexter MD   -------------------------------------------------------

## 2024-10-19 NOTE — ED ADULT NURSE NOTE - OBJECTIVE STATEMENT
Patient  is alert  and  oriented  x 4.  Color  is  good and  skin warm  to  touch.  He  is  c/o  nausea, abdominal pain, loose stools and  melena.  He  denies  dizziness  at  this  time.  Patient  recently  had  a  surgery done.

## 2024-10-19 NOTE — ED PROVIDER NOTE - CLINICAL SUMMARY MEDICAL DECISION MAKING FREE TEXT BOX
PHYSICAL EXAM:    GENERAL: Alert, appears stated age, well appearing, non-toxic  SKIN: Warm, pink and dry. MMM.   HEAD: NC, AT, no step offs   EYE: Normal lids/conjunctiva, PERRL, EOMI  ENT: Normal hearing, patent oropharynx   NECK: +supple. No meningismus, or JVD. no spinal ttp.   Pulm: Bilateral BS, normal resp effort, no wheezes, stridor, or retractions  CV: RRR, no M/R/G, 2+and = radial pulses  Abd: soft, non-tender, non-distendeds.   Mskel: no erythema, cyanosis, edema. no calf tenderness. +from to b/l Ue/le. +small superficial clean abrasion to L 3rd digit-- no active bleeding. no spinal ttp.   Neuro: AAOx3, no sensory/motor deficits, b/l. 5/5 strength throughout. normal gait.
see MD Note

## 2024-10-19 NOTE — ED ADULT TRIAGE NOTE - BIRTH SEX
"Problem: Goal Outcome Summary  Goal: Goal Outcome Summary  Outcome: Improving  Patient slept for most of the shift, up at about 0400 with complaints of his wrists being sore from the handcuffs. Patient requesting Hydrocortisone cream. Assessed wrists bilaterally, no open areas, but does have some redness. Patient informed that an order needs to be put in and pharmacy will have to provide us with that. Patient declined stating \"If I need to have it \"prescribed\" to me I will refuse it. What's the difference between getting it here or at [facility]. I don't need a prescription for it there\". Educated patient that anything that is not provided from the Hospital's stock room needs to have a separate order. Patient accepting of this information. Offered lotion in the meantime, patient accepted. Patient dismissive at first, but then was polite with this writer and UA. Will continue to monitor.       " Male

## 2024-10-19 NOTE — H&P ADULT - NSHPPHYSICALEXAM_GEN_ALL_CORE
Vital Signs Last 24 Hrs  T(C): 36.6 (19 Oct 2024 19:56), Max: 36.9 (19 Oct 2024 15:15)  T(F): 97.9 (19 Oct 2024 19:56), Max: 98.4 (19 Oct 2024 15:15)  HR: 72 (19 Oct 2024 19:56) (71 - 79)  BP: 130/67 (19 Oct 2024 19:56) (130/67 - 150/73)  BP(mean): --  RR: 18 (19 Oct 2024 19:56) (18 - 19)  SpO2: 99% (19 Oct 2024 19:56) (98% - 99%)    Parameters below as of 19 Oct 2024 19:56  Patient On (Oxygen Delivery Method): room air    General: well developed, well nourished, NAD  Neuro: alert and oriented, no focal deficits, moves all extremities spontaneously  HEENT: NCAT, EOMI, anicteric, mucosa moist  Respiratory: airway patent, respirations unlabored  CVS: regular rate and rhythm  Abdomen: incision sites c/d/i, soft, distended, mildly TTP RUQ/RLQ, no rebound/guarding  Extremities: no edema, sensation and movement grossly intact  Skin: warm, dry, appropriate color

## 2024-10-19 NOTE — H&P ADULT - ASSESSMENT
69M s/p laparoscopic appendectomy for acute appendicitis 10/16/24, post-op course c/b hypotension, improved and discharged home POD2, now re-presenting POD3 with abdominal pain, vomiting, and black stools. CT notable for ileus w/enteritis, no signs of bowel ischemia, stable post operative pneumoperitoneum. Labs unremarkable.    - Admit to Dr. Sneha Hernandez, Trauma/ACS  - Serial abdominal exams  - NPO  - IVF  - GI PCR, C diff, stool culture  - GI consult  - Pain control PRN  - VTE ppx: JARED Simon MD, PGY4  Trauma/Acute Care Surgery [id number]

## 2024-10-19 NOTE — ED PROVIDER NOTE - OBJECTIVE STATEMENT
Sony Marks MD, PGY3  69-year-old male with past medical history of incarcerated left inguinal hernia in 2022, recent appendectomy on 10–16 with course complicated by hypotension necessitating SICU admission, discharged from hospital yesterday presenting to emergency department with worsening lower abdominal pain, nausea, vomiting, diarrhea.  Patient has not taken any medications for pain thus far.  Patient has noted darker stools that are loose in nature.  States anytime he eats something within 10 to 15 minutes he feels nauseous and has episodes of spitting up, emesis.  Episodes of emesis are nonbloody, nonbilious.  Denies fever, headache, vision change, chest pain, shortness of breath, dysuria, hematuria, extremity edema, rash.    In emergency department patient is hemodynamically stable, afebrile.  Patient well-appearing in no acute distress.  On exam patient has tenderness to the palpation diffusely over lower abdomen worse in right lower quadrant without rebound or guarding.  Rest of exam unremarkable and as noted above.  Differential including but not limited to postoperative complication, abscess, diverticulitis/colitis, gastroenteritis, pancreatitis, GI bleed, SBO.  Plan to obtain labs, CT abdomen pelvis with IV contrast, treat symptomatically with IV fluids, Tylenol, Pepcid, Zofran.  Will reassess.  Likely will require surgical consultation.

## 2024-10-19 NOTE — ED PROVIDER NOTE - CARE PLAN
1 Principal Discharge DX:	Undifferentiated abdominal pain  Secondary Diagnosis:	Moderate dehydration

## 2024-10-19 NOTE — ED PROVIDER NOTE - PHYSICAL EXAMINATION
Gen: No acute distress  HEENT: EOMI, no nasal discharge, mucous membranes moist  CV: RRR, +S1/S2, no M/R/G, 2+ radial pulses b/l  Resp: CTAB, no W/R/R, no accessory muscle use, no increased work of breathing  GI: Abdomen with surgical incision over mid abdomen clean dry and intact. generalized ttp over lower abdomen with no rebound or guarding.   MSK: No open wounds, no bruising, no LE edema  Neuro: A&Ox4, following commands, moving all four extremities spontaneously  Psych: appropriate mood

## 2024-10-19 NOTE — ED ADULT TRIAGE NOTE - BP NONINVASIVE DIASTOLIC (MM HG)
Kevin Aguilar called requesting a refill of the below medication which has been pended for you:     Requested Prescriptions     Pending Prescriptions Disp Refills    oxybutynin (DITROPAN-XL) 5 MG extended release tablet [Pharmacy Med Name: OXYBUTYNIN ER 5MG TABLETS] 30 tablet 3     Sig: TAKE 1 TABLET BY MOUTH DAILY       Last Appointment Date: 9/3/2020  Next Appointment Date: 9/3/2021    No Known Allergies
77

## 2024-10-19 NOTE — ED ADULT TRIAGE NOTE - CHIEF COMPLAINT QUOTE
pt reports he had appendectomy 3 days ago, was d/c home yesterday and now pt endorsing abdominal pain, vomiting, and black stools;  denies anticoagulant use

## 2024-10-19 NOTE — ED ADULT NURSE NOTE - NSFALLUNIVINTERV_ED_ALL_ED
Bed/Stretcher in lowest position, wheels locked, appropriate side rails in place/Call bell, personal items and telephone in reach/Instruct patient to call for assistance before getting out of bed/chair/stretcher/Non-slip footwear applied when patient is off stretcher/[place] to call system/Physically safe environment - no spills, clutter or unnecessary equipment/Purposeful proactive rounding/Room/bathroom lighting operational, light cord in reach

## 2024-10-19 NOTE — H&P ADULT - ATTENDING COMMENTS
ATTENDING ATTESTATION  I have seen and examined this patient with the resident housestaff. I have reviewed all labs, imaging and reports. I have participated in formulating the plan, and have read and agree with the history, ROS, exam, assessment and plan as stated above.     POD 3 from surgically uncomplicated lap appy, with hospital course complicated by hypotension of unclear etiology. Discharged on POD 2.   Returns today with complaints of generalized malaise, vomiting, liquid stools since surgery, and one episode of black stools today.     On exam, abdomen has no evidence of port site infection; exam is soft with only mild right sided tenderness.   WBC normal (9.5) and lactate is normal (1.1).   BP is normal in ED.   Hb is stable from discharge (14).     CT performed shows diffusely dilated loops of small bowel and colon with wall thickening throughout.   Vasculature is all patent.   Small (decreased volume) pneumoperitoneum leftover from lap appy.     The CT scan is impressive for bowel dilation and wall thickening. My differential is post-op ileus vs. enteritis (which may fit more with the persistent diarrhea and bloody stool). WBC/lactate are normal and exam is relatively benign; no concern for missed injury.    Plan for admission to the floor.   No vomiting at present, but will place NGT if needed. Will send stool for GI PCR studies. No abx for now given normal WBC. Routine GI consult.     Sneha Hernandez M.D., M.S.  Division of Acute Care Surgery

## 2024-10-19 NOTE — ED PROVIDER NOTE - CHIEF COMPLAINT
The patient is a 69y Male complaining of abdominal pain. Tremfya Counseling: I discussed with the patient the risks of guselkumab including but not limited to immunosuppression, serious infections, worsening of inflammatory bowel disease and drug reactions.  The patient understands that monitoring is required including a PPD at baseline and must alert us or the primary physician if symptoms of infection or other concerning signs are noted.

## 2024-10-20 LAB
ANION GAP SERPL CALC-SCNC: 11 MMOL/L — SIGNIFICANT CHANGE UP (ref 5–17)
BUN SERPL-MCNC: 13 MG/DL — SIGNIFICANT CHANGE UP (ref 7–23)
C DIFF GDH STL QL: NEGATIVE — SIGNIFICANT CHANGE UP
C DIFF GDH STL QL: SIGNIFICANT CHANGE UP
CALCIUM SERPL-MCNC: 8.7 MG/DL — SIGNIFICANT CHANGE UP (ref 8.4–10.5)
CHLORIDE SERPL-SCNC: 102 MMOL/L — SIGNIFICANT CHANGE UP (ref 96–108)
CO2 SERPL-SCNC: 22 MMOL/L — SIGNIFICANT CHANGE UP (ref 22–31)
CREAT SERPL-MCNC: 0.59 MG/DL — SIGNIFICANT CHANGE UP (ref 0.5–1.3)
EGFR: 105 ML/MIN/1.73M2 — SIGNIFICANT CHANGE UP
GI PCR PANEL: SIGNIFICANT CHANGE UP
GLUCOSE SERPL-MCNC: 87 MG/DL — SIGNIFICANT CHANGE UP (ref 70–99)
HCT VFR BLD CALC: 35 % — LOW (ref 39–50)
HGB BLD-MCNC: 11.9 G/DL — LOW (ref 13–17)
MAGNESIUM SERPL-MCNC: 1.8 MG/DL — SIGNIFICANT CHANGE UP (ref 1.6–2.6)
MCHC RBC-ENTMCNC: 33.1 PG — SIGNIFICANT CHANGE UP (ref 27–34)
MCHC RBC-ENTMCNC: 34 GM/DL — SIGNIFICANT CHANGE UP (ref 32–36)
MCV RBC AUTO: 97.5 FL — SIGNIFICANT CHANGE UP (ref 80–100)
NRBC # BLD: 0 /100 WBCS — SIGNIFICANT CHANGE UP (ref 0–0)
PHOSPHATE SERPL-MCNC: 2.7 MG/DL — SIGNIFICANT CHANGE UP (ref 2.5–4.5)
PLATELET # BLD AUTO: 250 K/UL — SIGNIFICANT CHANGE UP (ref 150–400)
POTASSIUM SERPL-MCNC: 4.1 MMOL/L — SIGNIFICANT CHANGE UP (ref 3.5–5.3)
POTASSIUM SERPL-SCNC: 4.1 MMOL/L — SIGNIFICANT CHANGE UP (ref 3.5–5.3)
RBC # BLD: 3.59 M/UL — LOW (ref 4.2–5.8)
RBC # FLD: 11.4 % — SIGNIFICANT CHANGE UP (ref 10.3–14.5)
SODIUM SERPL-SCNC: 135 MMOL/L — SIGNIFICANT CHANGE UP (ref 135–145)
WBC # BLD: 8.06 K/UL — SIGNIFICANT CHANGE UP (ref 3.8–10.5)
WBC # FLD AUTO: 8.06 K/UL — SIGNIFICANT CHANGE UP (ref 3.8–10.5)

## 2024-10-20 PROCEDURE — 99222 1ST HOSP IP/OBS MODERATE 55: CPT | Mod: GC

## 2024-10-20 RX ORDER — SODIUM PHOSPHATE, MONOBASIC, MONOHYDRATE AND SODIUM PHOSPHATE, DIBASIC ANHYDROUS 276; 142 MG/ML; MG/ML
15 INJECTION, SOLUTION INTRAVENOUS ONCE
Refills: 0 | Status: COMPLETED | OUTPATIENT
Start: 2024-10-20 | End: 2024-10-20

## 2024-10-20 RX ORDER — POLYETHYLENE GLYCOL 3350 17 G/17G
17 POWDER, FOR SOLUTION ORAL
Refills: 0 | Status: DISCONTINUED | OUTPATIENT
Start: 2024-10-20 | End: 2024-10-20

## 2024-10-20 RX ORDER — ACETAMINOPHEN 500 MG
1000 TABLET ORAL ONCE
Refills: 0 | Status: COMPLETED | OUTPATIENT
Start: 2024-10-20 | End: 2024-10-20

## 2024-10-20 RX ORDER — NYSTATIN 10B UNIT
500000 POWDER (EA) MISCELLANEOUS
Refills: 0 | Status: DISCONTINUED | OUTPATIENT
Start: 2024-10-20 | End: 2024-10-21

## 2024-10-20 RX ORDER — MAGNESIUM SULFATE IN 0.9% NACL 2 G/50 ML
1 INTRAVENOUS SOLUTION, PIGGYBACK (ML) INTRAVENOUS ONCE
Refills: 0 | Status: COMPLETED | OUTPATIENT
Start: 2024-10-20 | End: 2024-10-20

## 2024-10-20 RX ADMIN — Medication 1000 MILLIGRAM(S): at 05:56

## 2024-10-20 RX ADMIN — Medication 400 MILLIGRAM(S): at 04:26

## 2024-10-20 RX ADMIN — Medication 100 MILLILITER(S): at 11:58

## 2024-10-20 RX ADMIN — Medication 40 MILLIGRAM(S): at 21:08

## 2024-10-20 RX ADMIN — FINASTERIDE 5 MILLIGRAM(S): 5 TABLET, FILM COATED ORAL at 21:08

## 2024-10-20 RX ADMIN — EZETIMIBE 10 MILLIGRAM(S): 10 TABLET ORAL at 11:58

## 2024-10-20 RX ADMIN — Medication 500000 UNIT(S): at 23:43

## 2024-10-20 RX ADMIN — Medication 100 GRAM(S): at 15:58

## 2024-10-20 RX ADMIN — Medication 100 MILLILITER(S): at 17:42

## 2024-10-20 RX ADMIN — SODIUM PHOSPHATE, MONOBASIC, MONOHYDRATE AND SODIUM PHOSPHATE, DIBASIC ANHYDROUS 63.75 MILLIMOLE(S): 276; 142 INJECTION, SOLUTION INTRAVENOUS at 17:42

## 2024-10-20 RX ADMIN — Medication 100 MILLILITER(S): at 03:45

## 2024-10-20 RX ADMIN — Medication 40 MILLIGRAM(S): at 05:56

## 2024-10-20 NOTE — PROGRESS NOTE ADULT - ASSESSMENT
69M s/p laparoscopic appendectomy for acute appendicitis 10/16/24, post-op course c/b hypotension, improved and discharged home POD2, now re-presenting POD3 with abdominal pain, vomiting, and black stools. CT notable for ileus w/enteritis, no signs of bowel ischemia, stable post operative pneumoperitoneum. Labs unremarkable.    Plan:  - Serial abdominal exams  - NPO/IVF  - f/u GI PCR, C diff, stool culture  - GI consulted - Recommending miralax BID, low threshold for NGT decompression.   - Pain control PRN  - VTE ppx: JARED Mann, PGY1  General Surgery  Trauma/ACS [id number]

## 2024-10-20 NOTE — CONSULT NOTE ADULT - ASSESSMENT
68 yo M with PMH of IBS-C, hernia repair s/p mesh 2022, active smoker, HLD, and recent appendectomy 10/16/2024 presenting for worsening abdominal pain and nausea for 3 days, found to have severe post-op ileus with nonspecific enteritis on imaging.     Impression:  #Severe Post-op Ileus  #Nonspecific Enteritis    P/w abdominal pain, bloating, and n/v for 3d. CTAP showed diffusely dilated loops of large and small bowel and no discrete transition point to suggest mechanical bowel obstruction with multifocal hyperenhancement throughout the small greater than large bowel. Findings overall consistent with severe post-op ileus in patient with baseline IBS-C. Nonspecific thickening of small bowel loops likely reactive in setting of ileus/postsurgical state. Low concern for infectious cause of enteritis though can r/o viral gastroenteritis and c-diff. Low c/f chronic inflammatory cause of enteritis. Pt denies any pain or n/v at this time and requesting food    - Consider NGT placement for decompression  - Ok to trial clear liquid diet from GI standpoint  - Start bowel regimen with miralax bid  - Send GI PCR and C-diff  - Avoid opioid medications    All recommendations are tentative until note is attested by attending.     Sarah Mensah, PGY4  Gastroenterology/Hepatology Fellow  Available on Microsoft Teams  [phone] (Long Range Pager)  [phone] (Short Range Pager [facility])    After 5 pm, please contact the on-call GI fellow for any urgent issues via the Hospital Call   70 yo M with PMH of IBS-C, hernia repair s/p mesh 2022, active smoker, HLD, and recent appendectomy 10/16/2024 presenting for worsening abdominal pain and nausea for 3 days, found to have severe post-op ileus with nonspecific enteritis on imaging.     Impression:  #Severe Post-op Ileus  #Nonspecific Enteritis    P/w abdominal pain, bloating, and n/v for 3d. CTAP showed diffusely dilated loops of large and small bowel and no discrete transition point to suggest mechanical bowel obstruction with multifocal hyperenhancement throughout the small greater than large bowel. Findings overall consistent with severe post-op ileus in patient with baseline IBS-C. Nonspecific thickening of small bowel loops likely reactive in setting of ileus/postsurgical state. Low concern for infectious cause of enteritis though can r/o viral gastroenteritis and c-diff. Low c/f chronic inflammatory cause of enteritis. Pt denies any pain or n/v at this time and requesting food    - Consider NGT placement for decompression  - Ok to trial clear liquid diet from GI standpoint  - Start bowel regimen with miralax bid, if no improvement add senna daily if no surgical contraindication  - Send GI PCR and C-diff  - Avoid opioid medications  - OOB, encourage ambulation  - K>4, Mg>2    All recommendations are tentative until note is attested by attending.     Sarah Mensah, PGY4  Gastroenterology/Hepatology Fellow  Available on Microsoft Teams  [phone] (Long Range Pager)  [phone] (Short Range Pager [facility])    After 5 pm, please contact the on-call GI fellow for any urgent issues via the Hospital Call

## 2024-10-20 NOTE — CONSULT NOTE ADULT - ATTENDING COMMENTS
69M  IBS-C, hernia repair s/p mesh 2022, active smoker, recent appendectomy 10/16/2024 p/w abdominal distension concerning for post-operative ileus, though infectious process also on ddx as CTAP with enteritis. For ileus recommend ambulation, CLD with advancement as tolerated, gentle bowel regimen with miralax and senna, correction of electrolyte derangements, placement of NGT if no improvement. Would check GI PCR and C diff given imaging findings of enteritis. Serial abdominal exams.

## 2024-10-20 NOTE — CHART NOTE - NSCHARTNOTEFT_GEN_A_CORE
Patient resting comfortably in chair. He does not endorse any abdominal pain, but does have some bloating. Last BM/gas was in the AM. Abdomen is soft and slightly distended with no TTP. Patient is tolerating his liquids, which he says he is taking a little at a time. Of note, he has noticed that he has had a very dry mouth and tongue. He has some white exudate on his tongue which can be scraped off with a tongue depressor. Patient is on Wixela at home, which was last used two weeks ago. Will prescribe oral nystatin to rinse mouth.

## 2024-10-20 NOTE — CONSULT NOTE ADULT - SUBJECTIVE AND OBJECTIVE BOX
Chief Complaint:  Patient is a 69y old  Male who presents with a chief complaint of     HPI:    Allergies:  No Known Allergies      Home Medications:    Hospital Medications:  enoxaparin Injectable 40 milliGRAM(s) SubCutaneous every 24 hours  ezetimibe 10 milliGRAM(s) Oral daily  finasteride 5 milliGRAM(s) Oral daily  lactated ringers. 1000 milliLiter(s) IV Continuous <Continuous>  rosuvastatin 40 milliGRAM(s) Oral at bedtime      PMHX/PSHX:  HLD (hyperlipidemia)    IBS (irritable bowel syndrome)    BPH (benign prostatic hyperplasia)    Closed wedge compression fracture of L1 vertebra, initial encounter    Smoker    History of laparoscopic cholecystectomy    Squamous cell skin cancer, face        Family history:  Family history of bone cancer (Mother)        Denies family history of colon cancer/polyps, stomach cancer/polyps, pancreatic cancer/masses, liver cancer/disease, ovarian cancer and endometrial cancer.    Social History:     Tob: Denies  EtOH: As above  Illicit Drugs: Denies    ROS:   General:  No wt loss, fevers, chills, night sweats, fatigue  Eyes:  Good vision, no reported pain  ENT:  No sore throat, pain, runny nose, dysphagia  CV:  No pain, palpitations, hypo/hypertension  Pulm:  No dyspnea, cough, tachypnea, wheezing  GI:  As per HPI  :  No pain, bleeding, incontinence, nocturia  Muscle:  No pain, weakness  Neuro:  No weakness, tingling, memory problems  Psych:  No fatigue, insomnia, mood problems, depression  Endocrine:  No polyuria, polydipsia, cold/heat intolerance  Heme:  No petechiae, ecchymosis, easy bruisability  Skin:  No rash, tattoos, scars, edema    PHYSICAL EXAM:   GENERAL:  No acute distress  HEENT:  Normocephalic/atraumatic, no scleral icterus  CHEST:  No accessory muscle use  HEART:  Regular rate and rhythm  ABDOMEN:  Soft, non-tender, non-distended, normoactive bowel sounds,  no masses, no hepato-splenomegaly, no signs of chronic liver disease  EXTREMITIES: No cyanosis, clubbing, or edema  SKIN:  No rash  NEURO:  Alert and oriented x 3, no asterixis    Vital Signs:  Vital Signs Last 24 Hrs  T(C): 36.9 (20 Oct 2024 04:58), Max: 37.6 (19 Oct 2024 23:58)  T(F): 98.4 (20 Oct 2024 04:58), Max: 99.7 (19 Oct 2024 23:58)  HR: 75 (20 Oct 2024 04:58) (59 - 79)  BP: 101/56 (20 Oct 2024 04:58) (101/56 - 150/73)  BP(mean): --  RR: 18 (20 Oct 2024 04:58) (18 - 19)  SpO2: 98% (20 Oct 2024 04:58) (97% - 99%)    Parameters below as of 20 Oct 2024 04:58  Patient On (Oxygen Delivery Method): room air      Daily Height in cm: 172.72 (19 Oct 2024 15:15)    Daily     LABS:                        11.9   8.06  )-----------( 250      ( 20 Oct 2024 06:56 )             35.0     Mean Cell Volume: 97.5 fl (10-20-24 @ 06:56)    10-20    135  |  102  |  13  ----------------------------<  87  4.1   |  22  |  0.59    Ca    8.7      20 Oct 2024 06:56  Phos  2.7     10-20  Mg     1.8     10-20    TPro  7.3  /  Alb  4.1  /  TBili  0.5  /  DBili  x   /  AST  15  /  ALT  11  /  AlkPhos  54  10-19    LIVER FUNCTIONS - ( 19 Oct 2024 17:28 )  Alb: 4.1 g/dL / Pro: 7.3 g/dL / ALK PHOS: 54 U/L / ALT: 11 U/L / AST: 15 U/L / GGT: x           PT/INR - ( 19 Oct 2024 17:28 )   PT: 12.0 sec;   INR: 1.04 ratio         PTT - ( 19 Oct 2024 17:28 )  PTT:28.8 sec  Urinalysis Basic - ( 20 Oct 2024 06:56 )    Color: x / Appearance: x / SG: x / pH: x  Gluc: 87 mg/dL / Ketone: x  / Bili: x / Urobili: x   Blood: x / Protein: x / Nitrite: x   Leuk Esterase: x / RBC: x / WBC x   Sq Epi: x / Non Sq Epi: x / Bacteria: x      Amylase Serum--      Lipase serum9       Ammonia--                          11.9   8.06  )-----------( 250      ( 20 Oct 2024 06:56 )             35.0                         14.2   9.58  )-----------( 260      ( 19 Oct 2024 17:28 )             41.8                         10.1   7.12  )-----------( 155      ( 18 Oct 2024 00:08 )             29.7                         11.0   7.84  )-----------( 165      ( 17 Oct 2024 16:34 )             31.5       Imaging:           Chief Complaint: abdominal pain    HPI:  Gonzalez Quick is a 68 yo M with PMH of IBS-C, hernia repair s/p mesh 2022, active smoker, HLD, and recent appendectomy 10/16/2024 presenting for worsening abdominal pain and nausea for 3 days. Patient recently admitted for appendicitis and underwent laparoscopic appendectomy on 10/16. Post-op course c/b hypotension requiring admission to the SICU. Patient endorses abdominal pain near surgical scar after surgery. He reports passing gas on Thursday and having a small normal BM Thursday night. He became nauseous Friday morning and vomited up his soup and noted abdominal cramping. He was discharged on Friday 10/18. On Friday night, he endorsed worsening abdominal distension and bloating sensation making him so uncomfortable that he could not sleep prompting him to come to ED for evaluation. Patient reports having 1 black loose episode of diarrhea on Friday and two on Saturday. At baseline, he has IBS-C and takes Linzess daily.     On admission, T 98.4, bp 134/79, HR 79, SpO2 98%. Labs overall unremarkable. CTAP showed severe post-op ileus with possible superimposed enteritis and stable postoperative pneumoperitoneum. Pt reports having 1 liquid BM since arrival.     Allergies:  No Known Allergies      Home Medications:    Hospital Medications:  enoxaparin Injectable 40 milliGRAM(s) SubCutaneous every 24 hours  ezetimibe 10 milliGRAM(s) Oral daily  finasteride 5 milliGRAM(s) Oral daily  lactated ringers. 1000 milliLiter(s) IV Continuous <Continuous>  rosuvastatin 40 milliGRAM(s) Oral at bedtime      PMHX/PSHX:  HLD (hyperlipidemia)    IBS (irritable bowel syndrome)    BPH (benign prostatic hyperplasia)    Closed wedge compression fracture of L1 vertebra, initial encounter    Smoker    History of laparoscopic cholecystectomy    Squamous cell skin cancer, face        Family history:  Family history of bone cancer (Mother)        Denies family history of colon cancer/polyps, stomach cancer/polyps, pancreatic cancer/masses, liver cancer/disease, ovarian cancer and endometrial cancer.    Social History:     Tob: Denies  EtOH: As above  Illicit Drugs: Denies    ROS:   General:  No wt loss, fevers, chills, night sweats, fatigue  Eyes:  Good vision, no reported pain  ENT:  No sore throat, pain, runny nose, dysphagia  CV:  No pain, palpitations, hypo/hypertension  Pulm:  No dyspnea, cough, tachypnea, wheezing  GI:  As per HPI  :  No pain, bleeding, incontinence, nocturia  Muscle:  No pain, weakness  Neuro:  No weakness, tingling, memory problems  Psych:  No fatigue, insomnia, mood problems, depression  Endocrine:  No polyuria, polydipsia, cold/heat intolerance  Heme:  No petechiae, ecchymosis, easy bruisability  Skin:  No rash, tattoos, scars, edema    PHYSICAL EXAM:   GENERAL:  No acute distress  HEENT:  Normocephalic/atraumatic, no scleral icterus  CHEST:  No accessory muscle use  HEART:  Regular rate and rhythm  ABDOMEN:  Soft, mildly tender diffusely, moderately distended, tympanic, surgical site c/d/i  EXTREMITIES: No cyanosis, clubbing, or edema  SKIN:  No rash  NEURO:  Alert and oriented x 3, no asterixis    Vital Signs:  Vital Signs Last 24 Hrs  T(C): 36.9 (20 Oct 2024 04:58), Max: 37.6 (19 Oct 2024 23:58)  T(F): 98.4 (20 Oct 2024 04:58), Max: 99.7 (19 Oct 2024 23:58)  HR: 75 (20 Oct 2024 04:58) (59 - 79)  BP: 101/56 (20 Oct 2024 04:58) (101/56 - 150/73)  BP(mean): --  RR: 18 (20 Oct 2024 04:58) (18 - 19)  SpO2: 98% (20 Oct 2024 04:58) (97% - 99%)    Parameters below as of 20 Oct 2024 04:58  Patient On (Oxygen Delivery Method): room air      Daily Height in cm: 172.72 (19 Oct 2024 15:15)    Daily     LABS:                        11.9   8.06  )-----------( 250      ( 20 Oct 2024 06:56 )             35.0     Mean Cell Volume: 97.5 fl (10-20-24 @ 06:56)    10-20    135  |  102  |  13  ----------------------------<  87  4.1   |  22  |  0.59    Ca    8.7      20 Oct 2024 06:56  Phos  2.7     10-20  Mg     1.8     10-20    TPro  7.3  /  Alb  4.1  /  TBili  0.5  /  DBili  x   /  AST  15  /  ALT  11  /  AlkPhos  54  10-19    LIVER FUNCTIONS - ( 19 Oct 2024 17:28 )  Alb: 4.1 g/dL / Pro: 7.3 g/dL / ALK PHOS: 54 U/L / ALT: 11 U/L / AST: 15 U/L / GGT: x           PT/INR - ( 19 Oct 2024 17:28 )   PT: 12.0 sec;   INR: 1.04 ratio         PTT - ( 19 Oct 2024 17:28 )  PTT:28.8 sec  Urinalysis Basic - ( 20 Oct 2024 06:56 )    Color: x / Appearance: x / SG: x / pH: x  Gluc: 87 mg/dL / Ketone: x  / Bili: x / Urobili: x   Blood: x / Protein: x / Nitrite: x   Leuk Esterase: x / RBC: x / WBC x   Sq Epi: x / Non Sq Epi: x / Bacteria: x      Amylase Serum--      Lipase serum9       Ammonia--                          11.9   8.06  )-----------( 250      ( 20 Oct 2024 06:56 )             35.0                         14.2   9.58  )-----------( 260      ( 19 Oct 2024 17:28 )             41.8                         10.1   7.12  )-----------( 155      ( 18 Oct 2024 00:08 )             29.7                         11.0   7.84  )-----------( 165      ( 17 Oct 2024 16:34 )             31.5       Imaging:  CTAP  BOWEL: The entire GI tract is filled with fluid with diffusely dilated   loops of large and small bowel and no discrete transition point to   suggest mechanical bowel obstruction. Mild compression of the second   through fourth portions of the duodenum, likely due to anteriorly located   fluid-filled bowel loops and mass effect. No discrete wall thickening or   pneumatosis. Multifocal hyperenhancement throughout the small greater   than large bowel. Appendix surgically absent  PERITONEUM/RETROPERITONEUM: Stable versus decreased volume of   pneumoperitoneum, likely redistributed due to abdominal fullness and   related to recent procedure.  VESSELS: Atherosclerotic changes. No portal venous gas. Patent portal   mesenteric veins  LYMPH NODES: No lymphadenopathy.  ABDOMINAL WALL: Postsurgical changes. Mild diffuse subcutaneous edema.  BONES: Status post L1 kyphoplasty, unchanged. No acute fractures.    IMPRESSION:  Severe postoperative ileus with possible superimposed enteritis. No   pneumatosis or portal venous gas/thrombus.  Postoperative pneumoperitoneumis stable versus decreased in volume and   redistributed.  Recommend nasogastric/orogastric decompression. Serial evaluation with   contrast-enhanced CT is recommended when clinically appropriate.           Chief Complaint: abdominal pain    HPI:  Gonzalez Quick is a 70 yo M with PMH of IBS-C, hernia repair s/p mesh 2022, active smoker, HLD, and recent appendectomy 10/16/2024 presenting for worsening abdominal pain and nausea for 3 days. Patient recently admitted for appendicitis and underwent laparoscopic appendectomy on 10/16. Post-op course c/b hypotension requiring admission to the SICU. Patient endorses abdominal pain near surgical scar after surgery. He reports passing gas on Thursday and having a small normal BM Thursday night. He became nauseous Friday morning and vomited up his soup and noted abdominal cramping. He was discharged on Friday 10/18. On Friday night, he endorsed worsening abdominal distension and bloating sensation making him so uncomfortable that he could not sleep prompting him to come to ED for evaluation. Patient reports having 1 black loose episode of diarrhea on Friday and two on Saturday. At baseline, he has IBS-C and takes Linzess 290 daily.     On admission, T 98.4, bp 134/79, HR 79, SpO2 98%. Labs overall unremarkable. CTAP showed severe post-op ileus with possible superimposed enteritis and stable postoperative pneumoperitoneum. Pt reports having 1 liquid BM since arrival.     Allergies:  No Known Allergies      Home Medications:    Hospital Medications:  enoxaparin Injectable 40 milliGRAM(s) SubCutaneous every 24 hours  ezetimibe 10 milliGRAM(s) Oral daily  finasteride 5 milliGRAM(s) Oral daily  lactated ringers. 1000 milliLiter(s) IV Continuous <Continuous>  rosuvastatin 40 milliGRAM(s) Oral at bedtime      PMHX/PSHX:  HLD (hyperlipidemia)    IBS (irritable bowel syndrome)    BPH (benign prostatic hyperplasia)    Closed wedge compression fracture of L1 vertebra, initial encounter    Smoker    History of laparoscopic cholecystectomy    Squamous cell skin cancer, face        Family history:  Family history of bone cancer (Mother)        Denies family history of colon cancer/polyps, stomach cancer/polyps, pancreatic cancer/masses, liver cancer/disease, ovarian cancer and endometrial cancer.    Social History:     Tob: Denies  EtOH: As above  Illicit Drugs: Denies    ROS:   General:  No wt loss, fevers, chills, night sweats, fatigue  Eyes:  Good vision, no reported pain  ENT:  No sore throat, pain, runny nose, dysphagia  CV:  No pain, palpitations, hypo/hypertension  Pulm:  No dyspnea, cough, tachypnea, wheezing  GI:  As per HPI  :  No pain, bleeding, incontinence, nocturia  Muscle:  No pain, weakness  Neuro:  No weakness, tingling, memory problems  Psych:  No fatigue, insomnia, mood problems, depression  Endocrine:  No polyuria, polydipsia, cold/heat intolerance  Heme:  No petechiae, ecchymosis, easy bruisability  Skin:  No rash, tattoos, scars, edema    PHYSICAL EXAM:   GENERAL:  No acute distress  HEENT:  Normocephalic/atraumatic, no scleral icterus  CHEST:  No accessory muscle use  HEART:  Regular rate and rhythm  ABDOMEN:  Soft, mildly tender diffusely, moderately distended, tympanic, surgical site c/d/i  EXTREMITIES: No cyanosis, clubbing, or edema  SKIN:  No rash  NEURO:  Alert and oriented x 3, no asterixis    Vital Signs:  Vital Signs Last 24 Hrs  T(C): 36.9 (20 Oct 2024 04:58), Max: 37.6 (19 Oct 2024 23:58)  T(F): 98.4 (20 Oct 2024 04:58), Max: 99.7 (19 Oct 2024 23:58)  HR: 75 (20 Oct 2024 04:58) (59 - 79)  BP: 101/56 (20 Oct 2024 04:58) (101/56 - 150/73)  BP(mean): --  RR: 18 (20 Oct 2024 04:58) (18 - 19)  SpO2: 98% (20 Oct 2024 04:58) (97% - 99%)    Parameters below as of 20 Oct 2024 04:58  Patient On (Oxygen Delivery Method): room air      Daily Height in cm: 172.72 (19 Oct 2024 15:15)    Daily     LABS:                        11.9   8.06  )-----------( 250      ( 20 Oct 2024 06:56 )             35.0     Mean Cell Volume: 97.5 fl (10-20-24 @ 06:56)    10-20    135  |  102  |  13  ----------------------------<  87  4.1   |  22  |  0.59    Ca    8.7      20 Oct 2024 06:56  Phos  2.7     10-20  Mg     1.8     10-20    TPro  7.3  /  Alb  4.1  /  TBili  0.5  /  DBili  x   /  AST  15  /  ALT  11  /  AlkPhos  54  10-19    LIVER FUNCTIONS - ( 19 Oct 2024 17:28 )  Alb: 4.1 g/dL / Pro: 7.3 g/dL / ALK PHOS: 54 U/L / ALT: 11 U/L / AST: 15 U/L / GGT: x           PT/INR - ( 19 Oct 2024 17:28 )   PT: 12.0 sec;   INR: 1.04 ratio         PTT - ( 19 Oct 2024 17:28 )  PTT:28.8 sec  Urinalysis Basic - ( 20 Oct 2024 06:56 )    Color: x / Appearance: x / SG: x / pH: x  Gluc: 87 mg/dL / Ketone: x  / Bili: x / Urobili: x   Blood: x / Protein: x / Nitrite: x   Leuk Esterase: x / RBC: x / WBC x   Sq Epi: x / Non Sq Epi: x / Bacteria: x      Amylase Serum--      Lipase serum9       Ammonia--                          11.9   8.06  )-----------( 250      ( 20 Oct 2024 06:56 )             35.0                         14.2   9.58  )-----------( 260      ( 19 Oct 2024 17:28 )             41.8                         10.1   7.12  )-----------( 155      ( 18 Oct 2024 00:08 )             29.7                         11.0   7.84  )-----------( 165      ( 17 Oct 2024 16:34 )             31.5       Imaging:  CTAP  BOWEL: The entire GI tract is filled with fluid with diffusely dilated   loops of large and small bowel and no discrete transition point to   suggest mechanical bowel obstruction. Mild compression of the second   through fourth portions of the duodenum, likely due to anteriorly located   fluid-filled bowel loops and mass effect. No discrete wall thickening or   pneumatosis. Multifocal hyperenhancement throughout the small greater   than large bowel. Appendix surgically absent  PERITONEUM/RETROPERITONEUM: Stable versus decreased volume of   pneumoperitoneum, likely redistributed due to abdominal fullness and   related to recent procedure.  VESSELS: Atherosclerotic changes. No portal venous gas. Patent portal   mesenteric veins  LYMPH NODES: No lymphadenopathy.  ABDOMINAL WALL: Postsurgical changes. Mild diffuse subcutaneous edema.  BONES: Status post L1 kyphoplasty, unchanged. No acute fractures.    IMPRESSION:  Severe postoperative ileus with possible superimposed enteritis. No   pneumatosis or portal venous gas/thrombus.  Postoperative pneumoperitoneumis stable versus decreased in volume and   redistributed.  Recommend nasogastric/orogastric decompression. Serial evaluation with   contrast-enhanced CT is recommended when clinically appropriate.

## 2024-10-21 ENCOUNTER — TRANSCRIPTION ENCOUNTER (OUTPATIENT)
Age: 69
End: 2024-10-21

## 2024-10-21 VITALS
SYSTOLIC BLOOD PRESSURE: 131 MMHG | DIASTOLIC BLOOD PRESSURE: 60 MMHG | HEART RATE: 59 BPM | TEMPERATURE: 98 F | OXYGEN SATURATION: 96 % | RESPIRATION RATE: 18 BRPM

## 2024-10-21 LAB
ANION GAP SERPL CALC-SCNC: 12 MMOL/L — SIGNIFICANT CHANGE UP (ref 5–17)
BUN SERPL-MCNC: 15 MG/DL — SIGNIFICANT CHANGE UP (ref 7–23)
CALCIUM SERPL-MCNC: 8.3 MG/DL — LOW (ref 8.4–10.5)
CHLORIDE SERPL-SCNC: 101 MMOL/L — SIGNIFICANT CHANGE UP (ref 96–108)
CO2 SERPL-SCNC: 22 MMOL/L — SIGNIFICANT CHANGE UP (ref 22–31)
CREAT SERPL-MCNC: 0.54 MG/DL — SIGNIFICANT CHANGE UP (ref 0.5–1.3)
EGFR: 108 ML/MIN/1.73M2 — SIGNIFICANT CHANGE UP
GLUCOSE SERPL-MCNC: 86 MG/DL — SIGNIFICANT CHANGE UP (ref 70–99)
HCT VFR BLD CALC: 32.3 % — LOW (ref 39–50)
HCT VFR BLD CALC: 33.8 % — LOW (ref 39–50)
HGB BLD-MCNC: 10.9 G/DL — LOW (ref 13–17)
HGB BLD-MCNC: 11.5 G/DL — LOW (ref 13–17)
MAGNESIUM SERPL-MCNC: 1.8 MG/DL — SIGNIFICANT CHANGE UP (ref 1.6–2.6)
MCHC RBC-ENTMCNC: 31.8 PG — SIGNIFICANT CHANGE UP (ref 27–34)
MCHC RBC-ENTMCNC: 31.9 PG — SIGNIFICANT CHANGE UP (ref 27–34)
MCHC RBC-ENTMCNC: 33.7 GM/DL — SIGNIFICANT CHANGE UP (ref 32–36)
MCHC RBC-ENTMCNC: 34 GM/DL — SIGNIFICANT CHANGE UP (ref 32–36)
MCV RBC AUTO: 93.9 FL — SIGNIFICANT CHANGE UP (ref 80–100)
MCV RBC AUTO: 94.2 FL — SIGNIFICANT CHANGE UP (ref 80–100)
NRBC # BLD: 0 /100 WBCS — SIGNIFICANT CHANGE UP (ref 0–0)
NRBC # BLD: 0 /100 WBCS — SIGNIFICANT CHANGE UP (ref 0–0)
PHOSPHATE SERPL-MCNC: 2.8 MG/DL — SIGNIFICANT CHANGE UP (ref 2.5–4.5)
PLATELET # BLD AUTO: 236 K/UL — SIGNIFICANT CHANGE UP (ref 150–400)
PLATELET # BLD AUTO: 249 K/UL — SIGNIFICANT CHANGE UP (ref 150–400)
POTASSIUM SERPL-MCNC: 3.7 MMOL/L — SIGNIFICANT CHANGE UP (ref 3.5–5.3)
POTASSIUM SERPL-SCNC: 3.7 MMOL/L — SIGNIFICANT CHANGE UP (ref 3.5–5.3)
RBC # BLD: 3.43 M/UL — LOW (ref 4.2–5.8)
RBC # BLD: 3.6 M/UL — LOW (ref 4.2–5.8)
RBC # FLD: 11.4 % — SIGNIFICANT CHANGE UP (ref 10.3–14.5)
RBC # FLD: 11.6 % — SIGNIFICANT CHANGE UP (ref 10.3–14.5)
SODIUM SERPL-SCNC: 135 MMOL/L — SIGNIFICANT CHANGE UP (ref 135–145)
SURGICAL PATHOLOGY STUDY: SIGNIFICANT CHANGE UP
WBC # BLD: 8.78 K/UL — SIGNIFICANT CHANGE UP (ref 3.8–10.5)
WBC # BLD: 9.7 K/UL — SIGNIFICANT CHANGE UP (ref 3.8–10.5)
WBC # FLD AUTO: 8.78 K/UL — SIGNIFICANT CHANGE UP (ref 3.8–10.5)
WBC # FLD AUTO: 9.7 K/UL — SIGNIFICANT CHANGE UP (ref 3.8–10.5)

## 2024-10-21 PROCEDURE — 74177 CT ABD & PELVIS W/CONTRAST: CPT | Mod: MC

## 2024-10-21 PROCEDURE — 99285 EMERGENCY DEPT VISIT HI MDM: CPT

## 2024-10-21 PROCEDURE — 87045 FECES CULTURE AEROBIC BACT: CPT

## 2024-10-21 PROCEDURE — 85025 COMPLETE CBC W/AUTO DIFF WBC: CPT

## 2024-10-21 PROCEDURE — 83735 ASSAY OF MAGNESIUM: CPT

## 2024-10-21 PROCEDURE — 87449 NOS EACH ORGANISM AG IA: CPT

## 2024-10-21 PROCEDURE — 96374 THER/PROPH/DIAG INJ IV PUSH: CPT

## 2024-10-21 PROCEDURE — 80048 BASIC METABOLIC PNL TOTAL CA: CPT

## 2024-10-21 PROCEDURE — 84145 PROCALCITONIN (PCT): CPT

## 2024-10-21 PROCEDURE — 84100 ASSAY OF PHOSPHORUS: CPT

## 2024-10-21 PROCEDURE — 87324 CLOSTRIDIUM AG IA: CPT

## 2024-10-21 PROCEDURE — 99231 SBSQ HOSP IP/OBS SF/LOW 25: CPT | Mod: 24

## 2024-10-21 PROCEDURE — 85045 AUTOMATED RETICULOCYTE COUNT: CPT

## 2024-10-21 PROCEDURE — 80053 COMPREHEN METABOLIC PANEL: CPT

## 2024-10-21 PROCEDURE — 87507 IADNA-DNA/RNA PROBE TQ 12-25: CPT

## 2024-10-21 PROCEDURE — 71046 X-RAY EXAM CHEST 2 VIEWS: CPT

## 2024-10-21 PROCEDURE — 96375 TX/PRO/DX INJ NEW DRUG ADDON: CPT

## 2024-10-21 PROCEDURE — 83605 ASSAY OF LACTIC ACID: CPT

## 2024-10-21 PROCEDURE — 85610 PROTHROMBIN TIME: CPT

## 2024-10-21 PROCEDURE — 99232 SBSQ HOSP IP/OBS MODERATE 35: CPT | Mod: GC

## 2024-10-21 PROCEDURE — 83690 ASSAY OF LIPASE: CPT

## 2024-10-21 PROCEDURE — 85027 COMPLETE CBC AUTOMATED: CPT

## 2024-10-21 PROCEDURE — 85730 THROMBOPLASTIN TIME PARTIAL: CPT

## 2024-10-21 PROCEDURE — 87046 STOOL CULTR AEROBIC BACT EA: CPT

## 2024-10-21 RX ADMIN — EZETIMIBE 10 MILLIGRAM(S): 10 TABLET ORAL at 11:23

## 2024-10-21 RX ADMIN — Medication 40 MILLIGRAM(S): at 04:52

## 2024-10-21 RX ADMIN — Medication 500000 UNIT(S): at 11:23

## 2024-10-21 RX ADMIN — Medication 500000 UNIT(S): at 04:50

## 2024-10-21 NOTE — DISCHARGE NOTE PROVIDER - CARE PROVIDER_API CALL
Melvin Choi [facility]  Surgery  [address] [place] [address], [facility] [address]  [place] [address]  Phone: [phone]  Fax: [phone]  Follow Up Time: 1 week

## 2024-10-21 NOTE — DISCHARGE NOTE NURSING/CASE MANAGEMENT/SOCIAL WORK - PATIENT PORTAL LINK FT
You can access the FollowMyHealth Patient Portal offered by [facility] by registering at the following website: [email or web address]. By joining [facility]’s FollowMyHealth portal, you will also be able to view your health information using other applications (apps) compatible with our system.

## 2024-10-21 NOTE — DISCHARGE NOTE PROVIDER - NSDCMRMEDTOKEN_GEN_ALL_CORE_FT
acetaminophen 500 mg oral tablet: 2 tab(s) orally every 6 hours  alfuzosin 10 mg oral tablet, extended release: 1 tab(s) orally once a day  cyclobenzaprine 5 mg oral tablet: 1 tab(s) orally 3 times a day, As Needed  diazePAM 5 mg oral tablet: 1 tab(s) orally every 8 hours, As Needed  ezetimibe 10 mg oral tablet: 1 tab(s) orally once a day  finasteride 5 mg oral tablet: 1 tab(s) orally once a day  Linzess 290 mcg oral capsule: 1 cap(s) orally once a day  Low Dose ASA 81 mg oral tablet: orally once a day  oxyCODONE 5 mg oral tablet: 1 tab(s) orally every 6 hours as needed for  severe pain MDD: 4 tabs daily  rosuvastatin 40 mg oral tablet: 1 tab(s) orally once a day  Vitamin D2 50 mcg (2000 intl units) oral capsule: 1 cap(s) orally once a day

## 2024-10-21 NOTE — DISCHARGE NOTE PROVIDER - HOSPITAL COURSE
HPI:69 year old man with a pertinent history of prior emergency surgery to repair an incarcerated left inguinal hernia with mesh placement (2022), hyperlipidemia, and active smoking (8 cigarettes per day) who initially presented 10/16/24 found to have acute appendicitis and underwent laparoscopic appendectomy 10/16/24. Post operatively patient was hypotensive nonresponsive to 2L fluid resuscitation and was admitted for to SICU for hemodynamic monitoring. CT A/P and laboratory workup were unrevealing. Patient's blood pressure somewhat improved/stabilized and he was discharged home in stable condition on 10/18/24. He now presents on POD3 c/o generalized malaise, anorexia, abdominal pain, and dark black stools. He ate just half a piece of toast with tea for breakfast this morning, which he immediately vomited. The vomitus was nonbloody, nonbilious. He then experienced multiple episodes of black liquid stools, associated with crampy lower abdominal pain. He denies fevers, chills. He did not take any PO pain medication after leaving the hospital. (19 Oct 2024 21:34)    Pt was admitted under [facility] for further evaluation and management. CT notable for ileus w/enteritis, no signs of bowel ischemia, stable post operative pneumoperitoneum.  GI was consulted for severe post op ileus. Cdiff negative. Stool culture negative. Occult blood was ***    Bowel movements decreased. Patient is passing gas.     On the day of discharge, the patient's vitals are stable, pain is controlled, voiding urine, passing gas/stool, tolerating a regular diet, and ambulating well. Pt will f/u with Dr. Choi in 1-2 weeks. Pt will f/u with PCP in 1-2 weeks. HPI:69 year old man with a pertinent history of prior emergency surgery to repair an incarcerated left inguinal hernia with mesh placement (2022), hyperlipidemia, and active smoking (8 cigarettes per day) who initially presented 10/16/24 found to have acute appendicitis and underwent laparoscopic appendectomy 10/16/24. Post operatively patient was hypotensive nonresponsive to 2L fluid resuscitation and was admitted for to SICU for hemodynamic monitoring. CT A/P and laboratory workup were unrevealing. Patient's blood pressure somewhat improved/stabilized and he was discharged home in stable condition on 10/18/24. He now presents on POD3 c/o generalized malaise, anorexia, abdominal pain, and dark black stools. He ate just half a piece of toast with tea for breakfast this morning, which he immediately vomited. The vomitus was nonbloody, nonbilious. He then experienced multiple episodes of black liquid stools, associated with crampy lower abdominal pain. He denies fevers, chills. He did not take any PO pain medication after leaving the hospital. (19 Oct 2024 21:34)    Pt was admitted under [facility] for further evaluation and management. CT notable for ileus w/enteritis, no signs of bowel ischemia, stable post operative pneumoperitoneum.  GI was consulted for severe post op ileus. Cdiff negative. Stool culture negative.     Bowel movements decreased. Patient is passing gas.     On the day of discharge, the patient's vitals are stable, pain is controlled, voiding urine, passing gas/stool, tolerating a regular diet, and ambulating well. Pt will f/u with Dr. Choi in 1-2 weeks. Pt will f/u with PCP in 1-2 weeks.

## 2024-10-21 NOTE — DISCHARGE NOTE NURSING/CASE MANAGEMENT/SOCIAL WORK - FINANCIAL ASSISTANCE
[facility] provides services at a reduced cost to those who are determined to be eligible through [facility]’s financial assistance program. Information regarding [facility]’s financial assistance program can be found by going to [email or web address] or by calling [phone].

## 2024-10-21 NOTE — DISCHARGE NOTE NURSING/CASE MANAGEMENT/SOCIAL WORK - NSDCFUADDAPPT_GEN_ALL_CORE_FT
Please follow up with your primary care provider in 1-2 weeks after discharge.    please follow up with your Gastroenterologist Dr. Ortiz next week. stool noted to be dark. h/h stable. outpatient GI work up recommended.

## 2024-10-21 NOTE — DISCHARGE NOTE PROVIDER - NSDCFUSCHEDAPPT_GEN_ALL_CORE_FT
Kenny Avilez  [facility]  PULMMED [address] [facility]  Scheduled Appointment: 10/29/2024    Jacques Brooks  [facility]  CARDIOLOGY [address] [facility]   Scheduled Appointment: 12/02/2024    [facility]  CARDIOLOGY [address] [facility]   Scheduled Appointment: 12/02/2024    [facility]  CARDIOLOGY [address] [facility]   Scheduled Appointment: 12/02/2024     Jacques Brooks  [facility]  CARDIOLOGY [address] [place] [address]   Scheduled Appointment: 12/02/2024    [facility]  CARDIOLOGY [address] [place] [address]   Scheduled Appointment: 12/02/2024    [facility]  CARDIOLOGY [address] [place] [address]   Scheduled Appointment: 12/02/2024

## 2024-10-21 NOTE — DISCHARGE NOTE PROVIDER - NSDCCPCAREPLAN_GEN_ALL_CORE_FT
PRINCIPAL DISCHARGE DIAGNOSIS  Diagnosis: Undifferentiated abdominal pain  Assessment and Plan of Treatment: WOUND CARE: Remove outer dressing prior to shower.  You may shower. Do not scrub incision. Pat Dry abdomen. Leave the white steri strips in place, they will fall off on their own in approximately 5-7 days. They will be removed in the office.  BATHING: You may shower and/or sponge bathe 24 hours after surgery. Do not submerge the incision underwater for the next 2 weeks.  ACTIVITY: No heavy lifting anything more than 10-15lbs or straining. Otherwise, you may return to your usual level of physical activity. If you are taking narcotic pain medication (such as Oxycodone) do NOT drive a car, operate machinery or make important decisions.  PAIN: For mild or moderate pain, you may take 975mg of tylenol every 6 hours. Do not exceed more than 4G tylenol per day.   NOTIFY YOUR SURGEON IF: You have any bleeding that does not stop, any pus draining from your wound, any fever (over 100.4 F) or chills, persistent nausea/vomiting with inability to tolerate food or liquids, persistent diarrhea, or if your pain is not controlled on your discharge pain medications.  FOLLOW-UP:  1. Please call to make a follow-up appointment within one to two weeks of discharge with Dr. Choi  2. Please follow up with your primary care physician in one week regarding your hospitalization.        SECONDARY DISCHARGE DIAGNOSES  Diagnosis: Moderate dehydration  Assessment and Plan of Treatment:

## 2024-10-21 NOTE — DISCHARGE NOTE NURSING/CASE MANAGEMENT/SOCIAL WORK - NSDCPETBCESMAN_GEN_ALL_CORE
Problem: Self Care Deficits Care Plan (Adult)  Goal: *Therapy Goal (Edit Goal, Insert Text)  Description  Occupational Therapy Goals   Long Term Goals  Initiated 2019 and to be accomplished within 4 week(s)  1. Pt will perform self-feeding with MI.  2. Pt will perform grooming with Set-up. 3. Pt will perform UB bathing with SBA. 4. Pt will perform LB bathing with Luba/CGA. 5. Pt will perform tub/shower transfer with Luba/CGA using LRAD. 6. Pt will perform UB dressing with Set-up. 7. Pt will perform LB dressing with Luba/CGA. 8. Pt will perform toileting task with Luba/CGA. 9. Pt will perform toilet transfer with Luba/CGA using LRAD. Short Term Goals   Initiated 2019 and to be accomplished within 7 day(s) (2019)  1. Pt will perform self-feeding with S; using compensatory strategies for right hand incorporation into set-up. 2. Pt will perform simple grooming task, including denture mgmt, with Luba and compensatory strategies as needed. 3. Pt will perform UB bathing with Luba using AE as needed. 4. Pt will perform LB bathing with ModA using AE and compensatory strategies as needed. 5. Pt will perform tub/shower transfer with modA x 1 <>TTB. 6. Pt will perform UB dressing with CGA. 7. Pt will perform LB dressing with ModA using AE as needed. 8. Pt will perform toileting task with ModA. 9. Pt will perform toilet transfer with ModA x 1 <>3:1 commode. Outcome: Progressing Towards Goal   OCCUPATIONAL THERAPY TREATMENT    Patient: Kitty Ricci   72 y.o. Patient identified with name and : yes    Date: 2019    First Tx Session  Time In: [address]  Time Out[de-identified] 80    Second Tx Session  Time In: 4272  Time Out[de-identified] 2018    Diagnosis: Acute ischemic stroke ([facility] [address] [I63.9]   Precautions: Aspiration, NWB  Chart, occupational therapy assessment, plan of care, and goals were reviewed. Pain:  Pt reports 0/10 pain or discomfort prior to treatment.     Pt reports 0/10 pain or discomfort post treatment. Intervention Provided: NA      SUBJECTIVE:   Spouse present for afternoon session, reporting pt has been performing SROM exercises in room but requires cues to slow pace. Pt re educated on importance of energy conservation and quality of movement for increased carryover with pt stating understanding, but requiring reminders during session. OBJECTIVE DATA SUMMARY:   Pt approached for 1st tx seated in w/c in room, pt agreeable. Pt brought down to gym for AE training. Pt provided with regular-sized karthik sock aide with visual demo and instruction for how to use equipment. Pt performed donning sock with fair accuracy and attempting to cecily onto foot. Pt requiring bariatric sock aide for donning success. OT unable to find karthik bariatric sock aide at time of session, but able to find one later in day. Pt would benefit from continued formal training on use of bariatric karthik sock aide for good carryover. Pt approached for 2nd session with pt finishing lunch in dining room, pt reporting getting lunch tray late. Pt educated on eating with good safety awareness and not rushing with session beginning ~15 minutes later with spouse bringing pt down to gym. THERAPEUTIC ACTIVITY Daily Assessment   Performed theract to facilitate continued jt ROM/mobility necessary for function of right UE as gross motor stabilizer in self care tasks such as simple grooming and decrease risk of contracture and pain. Performed scapular AAROM in all planes (elevation/depression, abd/adduction, pro/retraction) with pt seated EOM in preparation for NMRE and PROM for good jt mobility and integrity in the right UE . Pt demo'ing activation of rhomboids during scapular retraction. Performing PROM of right UE in all planes with increased tightness noted at elbow extensors with decrease in tone following low load prolonged stretch in elbow extension. Pt reporting no c/o pain with PROM in all planes of mvmt.         NMRE Daily Assessment   Pt performed NMRE for increased neuroplasticity to facilitate gross motor return to the right UE for increased function necessary for self care tasks. Performed isometric contractions with right UE 2 x 10 repetitions: shldr flexion, shldr extension, IR, ER, shldr abduction, shldr adduction. Noted muscular activation in all movements except no palpable contraction noted for ER. Pt performed isometrics with tactile assist at elbow at 90 degrees of flexion. With pt's right hand on thigh, pt instructed to perform supination>pronation, turning palm over, x 15 repetitions with 1x rest break. Unclear palpation of pt's pronator muscle for activation or whether pt utilizing trunk and compensation for movement. Performed weight bearing, trunk/core strengthening, crossing midline task with right UE on mat and reaching x 7 cones (held by spouse at variable levels on pt's right side). Pt required HOHA at elbow jt with pt demo'ing initiation of triceps during elbow extension with task. Performed 3 sets of 7. Performed task with lowered table with interlacing fingers and donning over 7x cones, removing 1 cone from stack at a time, and placing in new stack to the side. Pt required cues to slow pace and min tactile (A) for good interlacing of fingers (requiring incr assist for right thumb). Pt performed task moving cones from L>R and R>L with incr fatigue noted during 2nd set. MOBILITY/TRANSFERS Daily Assessment     Pt performed w/c>EOM transfer, requiring initial modA to stand and (A) at right LE to perform step transfer. Pt demo'ing poor control of left LE 2/2 to decreased quadriceps activation and extension. Pt sat EOM during NMRE/theract with F balance x 35 minutes for increased core/trunk strength and dynamic sitting balance necessary for cont self care (I).         ASSESSMENT:  Pt demo'ing activation of proximal shoulder musculature since initial evaluation with activation of pec major, anterior deltoid, rhomboids, and triceps noted today. Unclear of pt's pronator motor initiation at this time, continue to monitor for muscular activation moving more distally. Pt continuing to benefit from cues and reminders to slow pace and impulsiveness for good energy conservation and safety with right UE. Progression toward goals:  ?          Improving appropriately and progressing toward goals  ? Improving slowly and progressing toward goals  ? Not making progress toward goals and plan of care will be adjusted     PLAN:  Patient continues to benefit from skilled intervention to address the above impairments. Continue treatment per established plan of care. Discharge Recommendations:  Home Health vs [facility] Pending Progress   Further Equipment Recommendations for Discharge:  Shower Chair, 3:1     Activity Tolerance:  Fair       Estimated LOS: 9/12/2019    Please refer to the flowsheet for vital signs taken during this treatment. After treatment:   ?  Patient left in no apparent distress sitting up on EOM for PT hand off. Spouse present. COMMUNICATION/EDUCATION:   ? Home safety education was provided and the patient/caregiver indicated understanding. ? Patient/family have participated as able in goal setting and plan of care. ? Patient/family agree to work toward stated goals and plan of care. ? Patient understands intent and goals of therapy, but is neutral about his/her participation. ? Patient is unable to participate in goal setting and plan of care.       Jose Sanon, OT If you are a smoker, it is important for your health to stop smoking. Please be aware that second hand smoke is also harmful.

## 2024-10-21 NOTE — DISCHARGE NOTE PROVIDER - NSDCFUADDAPPT_GEN_ALL_CORE_FT
Please follow up with your primary care provider in 1-2 weeks after discharge.   Please follow up with your primary care provider in 1-2 weeks after discharge.    please follow up with your Gastroenterologist Dr. Ortiz next week. stool noted to be dark. h/h stable. outpatient GI work up recommended.

## 2024-10-21 NOTE — DISCHARGE NOTE PROVIDER - NSDCHC_MEDRECSTATUS_GEN_ALL_CORE
Quality 130: Documentation Of Current Medications In The Medical Record: Current Medications Documented Quality 431: Preventive Care And Screening: Unhealthy Alcohol Use - Screening: Patient not identified as an unhealthy alcohol user when screened for unhealthy alcohol use using a systematic screening method Quality 110: Preventive Care And Screening: Influenza Immunization: Influenza Immunization Ordered or Recommended, but not Administered due to system reason Detail Level: Detailed Quality 111:Pneumonia Vaccination Status For Older Adults: Pneumococcal vaccine (PPSV23) administered on or after patient’s 60th birthday and before the end of the measurement period Admission Reconciliation is Completed  Discharge Reconciliation is Completed Quality 226: Preventive Care And Screening: Tobacco Use: Screening And Cessation Intervention: Patient screened for tobacco use and is an ex/non-smoker

## 2024-10-21 NOTE — PROGRESS NOTE ADULT - ASSESSMENT
69M s/p laparoscopic appendectomy for acute appendicitis 10/16/24, post-op course c/b hypotension, improved and discharged home POD2, now re-presenting POD3 with abdominal pain, vomiting, and black stools. CT notable for ileus w/enteritis, no signs of bowel ischemia, stable post operative pneumoperitoneum. Labs unremarkable. now with return of bowel function.     Plan:  - regular diet  - dc planning  - GI consulted - Recommending miralax BID  - Pain control PRN  - VTE ppx: SCD    General Surgery  Trauma/ACS [id number]

## 2024-10-21 NOTE — PROGRESS NOTE ADULT - ASSESSMENT
68 yo M with PMH of IBS-C, hernia repair s/p mesh 2022, active smoker, HLD, and recent appendectomy 10/16/2024 presenting for worsening abdominal pain and nausea for 3 days, found to have severe post-op ileus with nonspecific enteritis on imaging.     Impression:  #Severe Post-op Ileus  #Nonspecific Enteritis    P/w abdominal pain, bloating, and n/v for 3d. CTAP showed diffusely dilated loops of large and small bowel and no discrete transition point to suggest mechanical bowel obstruction with multifocal hyperenhancement throughout the small greater than large bowel. Findings overall consistent with severe post-op ileus in patient with baseline IBS-C. Nonspecific thickening of small bowel loops likely reactive in setting of ileus/postsurgical state. C-diff and GI PCR negative for infection. Pt denies any pain or n/v at this time and requesting food    - Advance to regular diet  - Continue miralax bid  - Avoid opioid medications  - OOB, encourage ambulation  - K>4, Mg>2    All recommendations are tentative until note is attested by attending.     Sarah Mensah, PGY4  Gastroenterology/Hepatology Fellow  Available on [employer] Teams  [phone] (Long Range Pager)  [phone] (Short Range Pager [facility])    After 5 pm, please contact the on-call GI fellow for any urgent issues via the Hospital Call   70 yo M with PMH of IBS-C, hernia repair s/p mesh 2022, active smoker, HLD, and recent appendectomy 10/16/2024 presenting for worsening abdominal pain and nausea for 3 days, found to have severe post-op ileus with nonspecific enteritis on imaging.     Impression:  #Severe Post-op Ileus  #Nonspecific Enteritis    P/w abdominal pain, bloating, and n/v for 3d. CTAP showed diffusely dilated loops of large and small bowel and no discrete transition point to suggest mechanical bowel obstruction with multifocal hyperenhancement throughout the small greater than large bowel. Findings overall consistent with severe post-op ileus in patient with baseline IBS-C. Nonspecific thickening of small bowel loops likely reactive in setting of ileus/postsurgical state. C-diff and GI PCR negative for infection. Pt denies any pain or n/v at this time and requesting food    - Advance to regular diet  - Continue miralax bid  - Avoid opioid medications  - OOB, encourage ambulation  - K>4, Mg>2    We will sign off at this time, however please call back with questions or should any worsening/persistent issues arise.     All recommendations are tentative until note is attested by attending.     Sarah Mensah, PGY4  Gastroenterology/Hepatology Fellow  Available on [employer] Teams  [phone] (Long Range Pager)  [phone] (Short Range Pager [facility])    After 5 pm, please contact the on-call GI fellow for any urgent issues via the Hospital Call

## 2024-10-21 NOTE — PROGRESS NOTE ADULT - SUBJECTIVE AND OBJECTIVE BOX
ACS SURGERY DAILY PROGRESS NOTE:     SUBJECTIVE/ROS: Patient seen and examined. He is feeling well. denies abdominal pain, n/v. tolerating clear liquids.      MEDICATIONS  (STANDING):  enoxaparin Injectable 40 milliGRAM(s) SubCutaneous every 24 hours  ezetimibe 10 milliGRAM(s) Oral daily  finasteride 5 milliGRAM(s) Oral daily  lactated ringers. 1000 milliLiter(s) (50 mL/Hr) IV Continuous <Continuous>  nystatin    Suspension 250091 Unit(s) Oral four times a day  rosuvastatin 40 milliGRAM(s) Oral at bedtime    MEDICATIONS  (PRN):      OBJECTIVE:    Vital Signs Last 24 Hrs  T(C): 37.6 (21 Oct 2024 05:54), Max: 37.6 (21 Oct 2024 05:54)  T(F): 99.6 (21 Oct 2024 05:54), Max: 99.6 (21 Oct 2024 05:54)  HR: 67 (21 Oct 2024 05:54) (64 - 67)  BP: 120/51 (21 Oct 2024 05:54) (103/54 - 131/65)  BP(mean): --  RR: 18 (21 Oct 2024 05:54) (18 - 18)  SpO2: 94% (21 Oct 2024 05:54) (94% - 100%)    Parameters below as of 21 Oct 2024 05:54  Patient On (Oxygen Delivery Method): room air            I&O's Detail    20 Oct 2024 07:01  -  21 Oct 2024 07:00  --------------------------------------------------------  IN:    IV PiggyBack: 250 mL    Lactated Ringers: 1350 mL  Total IN: 1600 mL    OUT:    Oral Fluid: 0 mL    Voided (mL): 600 mL  Total OUT: 600 mL    Total NET: 1000 mL          Daily     Daily     LABS:                        11.9   8.06  )-----------( 250      ( 20 Oct 2024 06:56 )             35.0     10-20    135  |  102  |  13  ----------------------------<  87  4.1   |  22  |  0.59    Ca    8.7      20 Oct 2024 06:56  Phos  2.7     10-20  Mg     1.8     10-20    TPro  7.3  /  Alb  4.1  /  TBili  0.5  /  DBili  x   /  AST  15  /  ALT  11  /  AlkPhos  54  10-19    PT/INR - ( 19 Oct 2024 17:28 )   PT: 12.0 sec;   INR: 1.04 ratio         PTT - ( 19 Oct 2024 17:28 )  PTT:28.8 sec  Urinalysis Basic - ( 20 Oct 2024 06:56 )    Color: x / Appearance: x / SG: x / pH: x  Gluc: 87 mg/dL / Ketone: x  / Bili: x / Urobili: x   Blood: x / Protein: x / Nitrite: x   Leuk Esterase: x / RBC: x / WBC x   Sq Epi: x / Non Sq Epi: x / Bacteria: x                PHYSICAL EXAM:  GEN: ambulating around room, in no acute distress  RESP: no increased WOB, no tachypnea  ABD: soft, non-distended, non-tender to palpation without rebound tenderness or guarding. Incisions c/d/i  EXTR: warm, well-perfused without gross deformities  NEURO: awake, alert        
Subjective:  Patient seen at bedside this AM. Reports feeling well, without complaints. Denies chest pain, SOB. Tolerating diet without N/V.     24h Events:   - Overnight, no acute events    Objective:  Vital Signs  T(C): 36.9 (10-20 @ 04:58), Max: 37.6 (10-19 @ 23:58)  HR: 75 (10-20 @ 04:58) (59 - 75)  BP: 101/56 (10-20 @ 04:58) (101/56 - 150/73)  RR: 18 (10-20 @ 04:58) (18 - 19)  SpO2: 98% (10-20 @ 04:58) (97% - 99%)    Physical Exam:  GEN: ambulating around room, in no acute distress  RESP: no increased WOB, no tachypnea  ABD: soft, non-distended, non-tender to palpation without rebound tenderness or guarding. Incisions c/d/i  EXTR: warm, well-perfused without gross deformities  NEURO: awake, alert    Labs:                        11.9   8.06  )-----------( 250      ( 20 Oct 2024 06:56 )             35.0   10-20    135  |  102  |  13  ----------------------------<  87  4.1   |  22  |  0.59    Ca    8.7      20 Oct 2024 06:56  Phos  2.7     10-20  Mg     1.8     10-20    TPro  7.3  /  Alb  4.1  /  TBili  0.5  /  DBili  x   /  AST  15  /  ALT  11  /  AlkPhos  54  10-19    CAPILLARY BLOOD GLUCOSE          Medications:  MEDICATIONS  (STANDING):  enoxaparin Injectable 40 milliGRAM(s) SubCutaneous every 24 hours  ezetimibe 10 milliGRAM(s) Oral daily  finasteride 5 milliGRAM(s) Oral daily  lactated ringers. 1000 milliLiter(s) (100 mL/Hr) IV Continuous <Continuous>  magnesium sulfate  IVPB 1 Gram(s) IV Intermittent once  polyethylene glycol 3350 17 Gram(s) Oral two times a day  rosuvastatin 40 milliGRAM(s) Oral at bedtime  sodium phosphate 15 milliMole(s)/250 mL IVPB 15 milliMole(s) IV Intermittent once    MEDICATIONS  (PRN):      Imaging:
Interval Events:   - Pt had two liquid BMs yesterday  - Tolerated clear liquid diet without any pain or n/v    ROS:   12 point review of systems performed and negative except otherwise noted in HPI.    Hospital Medications:  enoxaparin Injectable 40 milliGRAM(s) SubCutaneous every 24 hours  ezetimibe 10 milliGRAM(s) Oral daily  finasteride 5 milliGRAM(s) Oral daily  lactated ringers. 1000 milliLiter(s) IV Continuous <Continuous>  nystatin    Suspension 196560 Unit(s) Oral four times a day  rosuvastatin 40 milliGRAM(s) Oral at bedtime      PHYSICAL EXAM:   Vital Signs:  Vital Signs Last 24 Hrs  T(C): 36.7 (21 Oct 2024 08:37), Max: 37.6 (21 Oct 2024 05:54)  T(F): 98 (21 Oct 2024 08:37), Max: 99.6 (21 Oct 2024 05:54)  HR: 61 (21 Oct 2024 08:37) (61 - 67)  BP: 122/69 (21 Oct 2024 08:37) (103/54 - 131/65)  BP(mean): --  RR: 18 (21 Oct 2024 08:37) (18 - 18)  SpO2: 96% (21 Oct 2024 08:37) (94% - 100%)    Parameters below as of 21 Oct 2024 08:37  Patient On (Oxygen Delivery Method): room air      Daily     Daily     GENERAL: no acute distress  NEURO: alert  HEENT: NCAT, no conjunctival pallor appreciated  CHEST: no respiratory distress, no accessory muscle use  CARDIAC: regular rate, +S1/S2  ABDOMEN: soft, nontender, mildly distended, surgical site c/d/i, no rebound or guarding  EXTREMITIES: warm, well perfused  SKIN: no lesions noted    LABS: reviewed                        11.9   8.06  )-----------( 250      ( 20 Oct 2024 06:56 )             35.0     10-20    135  |  102  |  13  ----------------------------<  87  4.1   |  22  |  0.59    Ca    8.7      20 Oct 2024 06:56  Phos  2.7     10-20  Mg     1.8     10-20    TPro  7.3  /  Alb  4.1  /  TBili  0.5  /  DBili  x   /  AST  15  /  ALT  11  /  AlkPhos  54  10-19    LIVER FUNCTIONS - ( 19 Oct 2024 17:28 )  Alb: 4.1 g/dL / Pro: 7.3 g/dL / ALK PHOS: 54 U/L / ALT: 11 U/L / AST: 15 U/L / GGT: x             Interval Diagnostic Studies: see sunrise for full report

## 2024-10-21 NOTE — PROGRESS NOTE ADULT - ATTENDING COMMENTS
I have seen and examined this patient on rounds this morning with the surgery team. I have reviewed all new labs, imaging and reports. I have participated in formulating the plan for the day, and have read and agree with the history, ROS, exam, assessment and plan as stated above.       Mr. Quick is feeling well. He is tolerating a regular diet without issue. He is passing gas. Denies nausea/vomiting. He is ready for discharge home. He will follow up in the office in 2 weeks.     Total time spent in the care of this patient today (excluding critical care, teaching & procedures): 25 minutes                    Over 50% of the total time was spent in discussion and coordination of care with consulting services, dietary and rehab services.
Agree w above. Post-op ileus, nonspecific enteritis on imaging. Tolerating liquid diet. Having soft BMs. Advance diet a tolerated. Encourage ambulation. Serial AXRs.

## 2024-10-22 LAB
CULTURE RESULTS: ABNORMAL
SPECIMEN SOURCE: SIGNIFICANT CHANGE UP

## 2024-10-29 ENCOUNTER — APPOINTMENT (OUTPATIENT)
Dept: PULMONOLOGY | Facility: CLINIC | Age: 69
End: 2024-10-29

## 2024-11-11 ENCOUNTER — APPOINTMENT (OUTPATIENT)
Dept: TRAUMA SURGERY | Facility: CLINIC | Age: 69
End: 2024-11-11

## 2024-12-02 ENCOUNTER — APPOINTMENT (OUTPATIENT)
Dept: CARDIOLOGY | Facility: CLINIC | Age: 69
End: 2024-12-02
Payer: MEDICARE

## 2024-12-02 ENCOUNTER — NON-APPOINTMENT (OUTPATIENT)
Age: 69
End: 2024-12-02

## 2024-12-02 VITALS
SYSTOLIC BLOOD PRESSURE: 122 MMHG | HEART RATE: 64 BPM | BODY MASS INDEX: 17.43 KG/M2 | WEIGHT: 115 LBS | HEIGHT: 68 IN | TEMPERATURE: 98.1 F | OXYGEN SATURATION: 100 % | RESPIRATION RATE: 16 BRPM | DIASTOLIC BLOOD PRESSURE: 78 MMHG

## 2024-12-02 DIAGNOSIS — I25.10 ATHEROSCLEROTIC HEART DISEASE OF NATIVE CORONARY ARTERY W/OUT ANGINA PECTORIS: ICD-10-CM

## 2024-12-02 DIAGNOSIS — I07.1 RHEUMATIC TRICUSPID INSUFFICIENCY: ICD-10-CM

## 2024-12-02 DIAGNOSIS — I65.29 OCCLUSION AND STENOSIS OF UNSPECIFIED CAROTID ARTERY: ICD-10-CM

## 2024-12-02 DIAGNOSIS — E78.5 HYPERLIPIDEMIA, UNSPECIFIED: ICD-10-CM

## 2024-12-02 DIAGNOSIS — I34.0 NONRHEUMATIC MITRAL (VALVE) INSUFFICIENCY: ICD-10-CM

## 2024-12-02 PROCEDURE — 99214 OFFICE O/P EST MOD 30 MIN: CPT

## 2024-12-02 PROCEDURE — 93880 EXTRACRANIAL BILAT STUDY: CPT

## 2024-12-02 PROCEDURE — G2211 COMPLEX E/M VISIT ADD ON: CPT

## 2024-12-02 PROCEDURE — 93000 ELECTROCARDIOGRAM COMPLETE: CPT

## 2024-12-02 PROCEDURE — 93306 TTE W/DOPPLER COMPLETE: CPT

## 2024-12-11 ENCOUNTER — RX RENEWAL (OUTPATIENT)
Age: 69
End: 2024-12-11

## 2024-12-12 ENCOUNTER — NON-APPOINTMENT (OUTPATIENT)
Age: 69
End: 2024-12-12

## 2025-01-21 ENCOUNTER — APPOINTMENT (OUTPATIENT)
Dept: PULMONOLOGY | Facility: CLINIC | Age: 70
End: 2025-01-21
Payer: MEDICARE

## 2025-01-21 VITALS
WEIGHT: 118 LBS | TEMPERATURE: 97.7 F | DIASTOLIC BLOOD PRESSURE: 68 MMHG | SYSTOLIC BLOOD PRESSURE: 126 MMHG | BODY MASS INDEX: 17.88 KG/M2 | HEART RATE: 86 BPM | HEIGHT: 68 IN | OXYGEN SATURATION: 97 %

## 2025-01-21 DIAGNOSIS — R91.8 OTHER NONSPECIFIC ABNORMAL FINDING OF LUNG FIELD: ICD-10-CM

## 2025-01-21 DIAGNOSIS — F17.200 NICOTINE DEPENDENCE, UNSPECIFIED, UNCOMPLICATED: ICD-10-CM

## 2025-01-21 DIAGNOSIS — J44.9 CHRONIC OBSTRUCTIVE PULMONARY DISEASE, UNSPECIFIED: ICD-10-CM

## 2025-01-21 DIAGNOSIS — J47.9 BRONCHIECTASIS, UNCOMPLICATED: ICD-10-CM

## 2025-01-21 PROCEDURE — 99214 OFFICE O/P EST MOD 30 MIN: CPT | Mod: 25

## 2025-01-21 PROCEDURE — 99406 BEHAV CHNG SMOKING 3-10 MIN: CPT | Mod: 25

## 2025-01-21 PROCEDURE — 94010 BREATHING CAPACITY TEST: CPT

## 2025-01-21 PROCEDURE — 95012 NITRIC OXIDE EXP GAS DETER: CPT

## 2025-01-21 PROCEDURE — 71046 X-RAY EXAM CHEST 2 VIEWS: CPT

## 2025-01-21 PROCEDURE — G0296 VISIT TO DETERM LDCT ELIG: CPT

## 2025-02-04 ENCOUNTER — NON-APPOINTMENT (OUTPATIENT)
Age: 70
End: 2025-02-04

## 2025-02-04 VITALS — BODY MASS INDEX: 17.88 KG/M2 | WEIGHT: 118 LBS | HEIGHT: 68 IN

## 2025-02-04 DIAGNOSIS — F17.200 NICOTINE DEPENDENCE, UNSPECIFIED, UNCOMPLICATED: ICD-10-CM

## 2025-02-05 ENCOUNTER — OUTPATIENT (OUTPATIENT)
Dept: OUTPATIENT SERVICES | Facility: HOSPITAL | Age: 70
LOS: 1 days | End: 2025-02-05
Payer: MEDICARE

## 2025-02-05 ENCOUNTER — APPOINTMENT (OUTPATIENT)
Dept: CT IMAGING | Facility: IMAGING CENTER | Age: 70
End: 2025-02-05

## 2025-02-05 DIAGNOSIS — Z90.49 ACQUIRED ABSENCE OF OTHER SPECIFIED PARTS OF DIGESTIVE TRACT: Chronic | ICD-10-CM

## 2025-02-05 DIAGNOSIS — F17.200 NICOTINE DEPENDENCE, UNSPECIFIED, UNCOMPLICATED: ICD-10-CM

## 2025-02-05 DIAGNOSIS — C44.320 SQUAMOUS CELL CARCINOMA OF SKIN OF UNSPECIFIED PARTS OF FACE: Chronic | ICD-10-CM

## 2025-02-05 PROCEDURE — 71271 CT THORAX LUNG CANCER SCR C-: CPT

## 2025-02-05 PROCEDURE — 71271 CT THORAX LUNG CANCER SCR C-: CPT | Mod: 26

## 2025-02-11 ENCOUNTER — NON-APPOINTMENT (OUTPATIENT)
Age: 70
End: 2025-02-11

## 2025-03-26 ENCOUNTER — APPOINTMENT (OUTPATIENT)
Dept: ORTHOPEDIC SURGERY | Facility: CLINIC | Age: 70
End: 2025-03-26
Payer: MEDICARE

## 2025-03-26 VITALS — HEIGHT: 68 IN | WEIGHT: 118 LBS | BODY MASS INDEX: 17.88 KG/M2

## 2025-03-26 PROCEDURE — 99203 OFFICE O/P NEW LOW 30 MIN: CPT | Mod: 25

## 2025-03-26 PROCEDURE — 99213 OFFICE O/P EST LOW 20 MIN: CPT | Mod: 25

## 2025-03-26 PROCEDURE — 20611 DRAIN/INJ JOINT/BURSA W/US: CPT | Mod: 50

## 2025-03-26 RX ORDER — MELOXICAM 15 MG/1
15 TABLET ORAL
Qty: 30 | Refills: 1 | Status: ACTIVE | COMMUNITY
Start: 2025-03-26 | End: 1900-01-01

## 2025-04-01 ENCOUNTER — APPOINTMENT (OUTPATIENT)
Dept: ORTHOPEDIC SURGERY | Facility: CLINIC | Age: 70
End: 2025-04-01
Payer: MEDICARE

## 2025-04-01 PROCEDURE — 20611 DRAIN/INJ JOINT/BURSA W/US: CPT | Mod: 50

## 2025-04-09 ENCOUNTER — APPOINTMENT (OUTPATIENT)
Dept: ORTHOPEDIC SURGERY | Facility: CLINIC | Age: 70
End: 2025-04-09
Payer: MEDICARE

## 2025-04-09 DIAGNOSIS — M17.12 UNILATERAL PRIMARY OSTEOARTHRITIS, LEFT KNEE: ICD-10-CM

## 2025-04-09 DIAGNOSIS — S80.01XA CONTUSION OF RIGHT KNEE, INITIAL ENCOUNTER: ICD-10-CM

## 2025-04-09 DIAGNOSIS — M17.11 UNILATERAL PRIMARY OSTEOARTHRITIS, RIGHT KNEE: ICD-10-CM

## 2025-04-09 PROCEDURE — 99213 OFFICE O/P EST LOW 20 MIN: CPT | Mod: 25

## 2025-04-09 PROCEDURE — 73564 X-RAY EXAM KNEE 4 OR MORE: CPT | Mod: RT

## 2025-04-09 PROCEDURE — 20611 DRAIN/INJ JOINT/BURSA W/US: CPT | Mod: 50

## 2025-05-06 ENCOUNTER — APPOINTMENT (OUTPATIENT)
Dept: PULMONOLOGY | Facility: CLINIC | Age: 70
End: 2025-05-06

## 2025-06-09 ENCOUNTER — APPOINTMENT (OUTPATIENT)
Dept: CARDIOLOGY | Facility: CLINIC | Age: 70
End: 2025-06-09

## 2025-06-17 ENCOUNTER — APPOINTMENT (OUTPATIENT)
Dept: PULMONOLOGY | Facility: CLINIC | Age: 70
End: 2025-06-17
Payer: MEDICARE

## 2025-06-17 VITALS
WEIGHT: 124 LBS | BODY MASS INDEX: 18.79 KG/M2 | DIASTOLIC BLOOD PRESSURE: 58 MMHG | OXYGEN SATURATION: 97 % | RESPIRATION RATE: 15 BRPM | HEIGHT: 68 IN | HEART RATE: 89 BPM | SYSTOLIC BLOOD PRESSURE: 101 MMHG

## 2025-06-17 LAB — HEMOGLOBIN: 14.2

## 2025-06-17 PROCEDURE — 94727 GAS DIL/WSHOT DETER LNG VOL: CPT

## 2025-06-17 PROCEDURE — 85018 HEMOGLOBIN: CPT | Mod: QW

## 2025-06-17 PROCEDURE — 99406 BEHAV CHNG SMOKING 3-10 MIN: CPT

## 2025-06-17 PROCEDURE — 99214 OFFICE O/P EST MOD 30 MIN: CPT | Mod: 25

## 2025-06-17 PROCEDURE — 94729 DIFFUSING CAPACITY: CPT

## 2025-06-17 PROCEDURE — 94060 EVALUATION OF WHEEZING: CPT

## 2025-06-19 ENCOUNTER — RX RENEWAL (OUTPATIENT)
Age: 70
End: 2025-06-19

## 2025-06-23 ENCOUNTER — RX RENEWAL (OUTPATIENT)
Age: 70
End: 2025-06-23

## 2025-06-24 RX ORDER — FLUTICASONE FUROATE, UMECLIDINIUM BROMIDE AND VILANTEROL TRIFENATATE 200; 62.5; 25 UG/1; UG/1; UG/1
200-62.5-25 POWDER RESPIRATORY (INHALATION)
Qty: 1 | Refills: 3 | Status: ACTIVE | COMMUNITY
Start: 2025-06-24 | End: 1900-01-01

## 2025-07-10 ENCOUNTER — APPOINTMENT (OUTPATIENT)
Dept: ORTHOPEDIC SURGERY | Facility: CLINIC | Age: 70
End: 2025-07-10
Payer: MEDICARE

## 2025-07-10 VITALS — BODY MASS INDEX: 18.79 KG/M2 | WEIGHT: 124 LBS | HEIGHT: 68 IN

## 2025-07-10 PROCEDURE — 99214 OFFICE O/P EST MOD 30 MIN: CPT | Mod: 25

## 2025-07-10 PROCEDURE — 20611 DRAIN/INJ JOINT/BURSA W/US: CPT | Mod: LT

## 2025-07-10 PROCEDURE — 73564 X-RAY EXAM KNEE 4 OR MORE: CPT | Mod: LT

## 2025-07-10 RX ORDER — MELOXICAM 15 MG/1
15 TABLET ORAL
Qty: 30 | Refills: 1 | Status: ACTIVE | COMMUNITY
Start: 2025-07-10 | End: 1900-01-01

## 2025-07-14 ENCOUNTER — RX RENEWAL (OUTPATIENT)
Age: 70
End: 2025-07-14

## 2025-07-21 ENCOUNTER — RX RENEWAL (OUTPATIENT)
Age: 70
End: 2025-07-21

## 2025-08-11 ENCOUNTER — NON-APPOINTMENT (OUTPATIENT)
Age: 70
End: 2025-08-11

## 2025-08-13 ENCOUNTER — APPOINTMENT (OUTPATIENT)
Dept: CARDIOLOGY | Facility: CLINIC | Age: 70
End: 2025-08-13
Payer: MEDICARE

## 2025-08-13 VITALS
WEIGHT: 122 LBS | SYSTOLIC BLOOD PRESSURE: 110 MMHG | HEART RATE: 71 BPM | TEMPERATURE: 98 F | OXYGEN SATURATION: 98 % | RESPIRATION RATE: 16 BRPM | HEIGHT: 68 IN | BODY MASS INDEX: 18.49 KG/M2 | DIASTOLIC BLOOD PRESSURE: 70 MMHG

## 2025-08-13 DIAGNOSIS — E78.5 HYPERLIPIDEMIA, UNSPECIFIED: ICD-10-CM

## 2025-08-13 DIAGNOSIS — I34.0 NONRHEUMATIC MITRAL (VALVE) INSUFFICIENCY: ICD-10-CM

## 2025-08-13 DIAGNOSIS — R06.09 OTHER FORMS OF DYSPNEA: ICD-10-CM

## 2025-08-13 DIAGNOSIS — I25.10 ATHEROSCLEROTIC HEART DISEASE OF NATIVE CORONARY ARTERY W/OUT ANGINA PECTORIS: ICD-10-CM

## 2025-08-13 PROCEDURE — 93015 CV STRESS TEST SUPVJ I&R: CPT

## 2025-08-13 PROCEDURE — 99213 OFFICE O/P EST LOW 20 MIN: CPT | Mod: 25

## (undated) DEVICE — DRSG CURITY GAUZE SPONGE 4 X 4" 12-PLY

## (undated) DEVICE — DRSG OPSITE 2.5 X 2"

## (undated) DEVICE — DISSECTOR ENDO PEANUT 5MM

## (undated) DEVICE — STAPLER COVIDIEN ENDO GIA STANDARD HANDLE

## (undated) DEVICE — TUBING INSUFFLATION LAP FILTER 10FT

## (undated) DEVICE — TROCAR APPLIED MEDICAL KII BALLOON BLUNT TIP 12MM X 130MM

## (undated) DEVICE — WARMING BLANKET UPPER ADULT

## (undated) DEVICE — VENODYNE/SCD SLEEVE CALF MEDIUM

## (undated) DEVICE — WARMING BLANKET LOWER ADULT

## (undated) DEVICE — GLV 7 PROTEXIS (WHITE)

## (undated) DEVICE — DRSG STERISTRIPS 0.5 X 4"

## (undated) DEVICE — BLADE SCALPEL SAFETYLOCK #15

## (undated) DEVICE — DRAPE 3/4 SHEET W REINFORCEMENT 56X77"

## (undated) DEVICE — PREP CHLORAPREP HI-LITE ORANGE 26ML

## (undated) DEVICE — GLV 6.5 PROTEXIS (WHITE)

## (undated) DEVICE — INSUFFLATION NDL COVIDIEN STEP 14G FOR STEP/VERSASTEP

## (undated) DEVICE — GLV 8 PROTEXIS (WHITE)

## (undated) DEVICE — TROCAR COVIDIEN STEP 5MM SHORT 70MM

## (undated) DEVICE — TROCAR COVIDIEN VERSASTEP PLUS 12MM STANDARD

## (undated) DEVICE — TROCAR COVIDIEN VERSASTEP PLUS 11MM STANDARD

## (undated) DEVICE — LIGASURE IMPACT

## (undated) DEVICE — ELCTR BOVIE PENCIL SMOKE EVACUATION

## (undated) DEVICE — PACK MINOR WITH LAP

## (undated) DEVICE — TROCAR ETHICON ENDOPATH XCEL BLADELESS 5MM X 100MM STABILITY

## (undated) DEVICE — FOR-ESU VALLEYLAB T7E15009DX: Type: DURABLE MEDICAL EQUIPMENT

## (undated) DEVICE — TROCAR COVIDIEN VERSASTEP 5MM SHORT

## (undated) DEVICE — POSITIONER FOAM EGG CRATE ULNAR 2PCS (PINK)

## (undated) DEVICE — GLV 8.5 PROTEXIS (WHITE)

## (undated) DEVICE — GLV 7.5 PROTEXIS (WHITE)

## (undated) DEVICE — LIGASURE BLUNT TIP 37CM

## (undated) DEVICE — SOL IRR POUR H2O 250ML

## (undated) DEVICE — SUT BIOSYN 4-0 18" P-12

## (undated) DEVICE — DRAPE C ARM 41X140"

## (undated) DEVICE — DRAPE TOWEL BLUE 17" X 24"

## (undated) DEVICE — STRYKER IVAS BONE BIOPSY KIT 11G

## (undated) DEVICE — DRAPE GENERAL ENDOSCOPY

## (undated) DEVICE — DRAPE IOBAN 13" X 13"

## (undated) DEVICE — DRAPE SURGICAL #1010

## (undated) DEVICE — LIGASURE MARYLAND 37CM

## (undated) DEVICE — DRSG TEGADERM 4X4.75"

## (undated) DEVICE — TROCAR COVIDIEN BLUNT TIP HASSAN 10MM

## (undated) DEVICE — SUT HISTOACRYL BLUE

## (undated) DEVICE — TUBING IRRIGATION DAVOL SYSTEM X STREAM

## (undated) DEVICE — TROCAR APPLIED MEDICAL KII BALLOON BLUNT TIP 12MM X 100MM

## (undated) DEVICE — DRAPE INSTRUMENT POUCH 6.75" X 11"

## (undated) DEVICE — PACK LAP CHOLE LAP APPENDECTOMY

## (undated) DEVICE — INSUFFLATION NDL COVIDIEN STEP 14G SHORT FOR STEP/VERSASTEP

## (undated) DEVICE — D HELP - CLEARVIEW CLEARIFY SYSTEM

## (undated) DEVICE — SUT MONOCRYL 4-0 27" PS-2 UNDYED

## (undated) DEVICE — STRYKER IVAS CURETTE 11G

## (undated) DEVICE — BLADE SCALPEL SAFETYLOCK #10

## (undated) DEVICE — GOWN TRIMAX LG

## (undated) DEVICE — ENDOCATCH 10MM SPECIMEN POUCH

## (undated) DEVICE — SUT VICRYL PLUS 0 27" CT-3

## (undated) DEVICE — TROCAR COVIDIEN BLUNT TIP HASSAN 12MM

## (undated) DEVICE — DRSG TEGADERM 6"X8"

## (undated) DEVICE — NDL HYPO SAFE 25G X 1.5" (ORANGE)

## (undated) DEVICE — SPECIMEN CONTAINER 100ML

## (undated) DEVICE — SOL IRR POUR NS 0.9% 500ML

## (undated) DEVICE — MEDICATION LABELS W MARKER